# Patient Record
Sex: FEMALE | Race: BLACK OR AFRICAN AMERICAN | NOT HISPANIC OR LATINO | Employment: OTHER | URBAN - METROPOLITAN AREA
[De-identification: names, ages, dates, MRNs, and addresses within clinical notes are randomized per-mention and may not be internally consistent; named-entity substitution may affect disease eponyms.]

---

## 2018-10-12 ENCOUNTER — HISTORICAL (OUTPATIENT)
Dept: ADMINISTRATIVE | Facility: HOSPITAL | Age: 73
End: 2018-10-12

## 2018-10-12 LAB
ABS NEUT (OLG): 2.48 X10(3)/MCL (ref 2.1–9.2)
ALBUMIN SERPL-MCNC: 3.4 GM/DL (ref 3.4–5)
ALBUMIN/GLOB SERPL: 1 RATIO (ref 1–2)
ALP SERPL-CCNC: 100 UNIT/L (ref 45–117)
ALT SERPL-CCNC: 35 UNIT/L (ref 12–78)
AST SERPL-CCNC: 25 UNIT/L (ref 15–37)
BASOPHILS # BLD AUTO: 0.05 X10(3)/MCL
BASOPHILS NFR BLD AUTO: 1 %
BILIRUB SERPL-MCNC: 0.2 MG/DL (ref 0.2–1)
BILIRUBIN DIRECT+TOT PNL SERPL-MCNC: <0.1 MG/DL
BILIRUBIN DIRECT+TOT PNL SERPL-MCNC: ABNORMAL MG/DL
BUN SERPL-MCNC: 22 MG/DL (ref 7–18)
CALCIUM SERPL-MCNC: 8.9 MG/DL (ref 8.5–10.1)
CHLORIDE SERPL-SCNC: 105 MMOL/L (ref 98–107)
CHOLEST SERPL-MCNC: 136 MG/DL
CHOLEST/HDLC SERPL: 4.4 {RATIO} (ref 0–4.4)
CO2 SERPL-SCNC: 28 MMOL/L (ref 21–32)
CREAT SERPL-MCNC: 0.8 MG/DL (ref 0.6–1.3)
EOSINOPHIL # BLD AUTO: 0.16 10*3/UL
EOSINOPHIL NFR BLD AUTO: 3 %
ERYTHROCYTE [DISTWIDTH] IN BLOOD BY AUTOMATED COUNT: 12.5 % (ref 11.5–14.5)
EST. AVERAGE GLUCOSE BLD GHB EST-MCNC: 126 MG/DL
GLOBULIN SER-MCNC: 4.1 GM/ML (ref 2.3–3.5)
GLUCOSE SERPL-MCNC: 84 MG/DL (ref 74–106)
HBA1C MFR BLD: 6 % (ref 4.2–6.3)
HCT VFR BLD AUTO: 38.9 % (ref 35–46)
HDLC SERPL-MCNC: 31 MG/DL
HGB BLD-MCNC: 13.1 GM/DL (ref 12–16)
IMM GRANULOCYTES # BLD AUTO: 0.01 10*3/UL
IMM GRANULOCYTES NFR BLD AUTO: 0 %
LDLC SERPL CALC-MCNC: 82 MG/DL (ref 0–130)
LYMPHOCYTES # BLD AUTO: 1.9 X10(3)/MCL
LYMPHOCYTES NFR BLD AUTO: 38 % (ref 13–40)
MCH RBC QN AUTO: 31.5 PG (ref 26–34)
MCHC RBC AUTO-ENTMCNC: 33.7 GM/DL (ref 31–37)
MCV RBC AUTO: 93.5 FL (ref 80–100)
MONOCYTES # BLD AUTO: 0.47 X10(3)/MCL
MONOCYTES NFR BLD AUTO: 9 % (ref 4–12)
NEUTROPHILS # BLD AUTO: 2.48 X10(3)/MCL
NEUTROPHILS NFR BLD AUTO: 49 X10(3)/MCL
PLATELET # BLD AUTO: 255 X10(3)/MCL (ref 130–400)
PMV BLD AUTO: 9.3 FL (ref 7.4–10.4)
POTASSIUM SERPL-SCNC: 4.3 MMOL/L (ref 3.5–5.1)
PROT SERPL-MCNC: 7.5 GM/DL (ref 6.4–8.2)
RBC # BLD AUTO: 4.16 X10(6)/MCL (ref 4–5.2)
SODIUM SERPL-SCNC: 140 MMOL/L (ref 136–145)
TRIGL SERPL-MCNC: 114 MG/DL
TSH SERPL-ACNC: 1.27 MIU/L (ref 0.36–3.74)
VLDLC SERPL CALC-MCNC: 23 MG/DL
WBC # SPEC AUTO: 5.1 X10(3)/MCL (ref 4.5–11)

## 2018-11-29 ENCOUNTER — HISTORICAL (OUTPATIENT)
Dept: INTERNAL MEDICINE | Facility: CLINIC | Age: 73
End: 2018-11-29

## 2019-04-09 ENCOUNTER — HISTORICAL (OUTPATIENT)
Dept: ADMINISTRATIVE | Facility: HOSPITAL | Age: 74
End: 2019-04-09

## 2019-04-09 LAB
APPEARANCE, UA: CLEAR
BACTERIA #/AREA URNS AUTO: ABNORMAL /[HPF]
BILIRUB SERPL-MCNC: NEGATIVE MG/DL
BILIRUB UR QL STRIP: NEGATIVE
BLOOD URINE, POC: NEGATIVE
CLARITY, POC UA: NORMAL
COLOR UR: YELLOW
COLOR, POC UA: YELLOW
GLUCOSE (UA): NORMAL
GLUCOSE UR QL STRIP: NEGATIVE
HGB UR QL STRIP: NEGATIVE
HYALINE CASTS #/AREA URNS LPF: ABNORMAL /[LPF]
KETONES UR QL STRIP: NEGATIVE
KETONES UR QL STRIP: NEGATIVE
LEUKOCYTE EST, POC UA: NORMAL
LEUKOCYTE ESTERASE UR QL STRIP: 75 LEU/UL
NITRITE UR QL STRIP: ABNORMAL
NITRITE, POC UA: POSITIVE
PH UR STRIP: 6 [PH] (ref 4.5–8)
PH, POC UA: 6
PROT UR QL STRIP: 10 MG/DL
PROTEIN, POC: NEGATIVE
RBC #/AREA URNS AUTO: ABNORMAL /[HPF]
SP GR UR STRIP: 1.02 (ref 1–1.03)
SPECIFIC GRAVITY, POC UA: 1.03
SQUAMOUS #/AREA URNS LPF: ABNORMAL /[LPF]
UROBILINOGEN UR STRIP-ACNC: NORMAL
UROBILINOGEN, POC UA: NORMAL
WBC #/AREA URNS AUTO: ABNORMAL /HPF

## 2019-10-30 ENCOUNTER — HISTORICAL (OUTPATIENT)
Dept: ENDOSCOPY | Facility: HOSPITAL | Age: 74
End: 2019-10-30

## 2020-01-20 ENCOUNTER — HISTORICAL (OUTPATIENT)
Dept: RADIOLOGY | Facility: HOSPITAL | Age: 75
End: 2020-01-20

## 2020-08-25 ENCOUNTER — HISTORICAL (OUTPATIENT)
Dept: INTERNAL MEDICINE | Facility: CLINIC | Age: 75
End: 2020-08-25

## 2020-08-25 LAB
ABS NEUT (OLG): 2.4 X10(3)/MCL (ref 2.1–9.2)
ALBUMIN SERPL-MCNC: 3.6 GM/DL (ref 3.4–5)
ALBUMIN/GLOB SERPL: 0.9 RATIO (ref 1.1–2)
ALP SERPL-CCNC: 99 UNIT/L (ref 45–117)
ALT SERPL-CCNC: 34 UNIT/L (ref 12–78)
AST SERPL-CCNC: 24 UNIT/L (ref 15–37)
BASOPHILS # BLD AUTO: 0 X10(3)/MCL (ref 0–0.2)
BASOPHILS NFR BLD AUTO: 1 %
BILIRUB SERPL-MCNC: 0.4 MG/DL (ref 0.2–1)
BILIRUBIN DIRECT+TOT PNL SERPL-MCNC: 0.1 MG/DL (ref 0–0.2)
BILIRUBIN DIRECT+TOT PNL SERPL-MCNC: 0.3 MG/DL
BUN SERPL-MCNC: 19 MG/DL (ref 7–18)
CALCIUM SERPL-MCNC: 9.2 MG/DL (ref 8.5–10.1)
CHLORIDE SERPL-SCNC: 108 MMOL/L (ref 98–107)
CHOLEST SERPL-MCNC: 172 MG/DL
CHOLEST/HDLC SERPL: 4.8 {RATIO} (ref 0–4.4)
CO2 SERPL-SCNC: 27 MMOL/L (ref 21–32)
CREAT SERPL-MCNC: 0.8 MG/DL (ref 0.6–1.3)
EOSINOPHIL # BLD AUTO: 0.1 X10(3)/MCL (ref 0–0.9)
EOSINOPHIL NFR BLD AUTO: 1 %
ERYTHROCYTE [DISTWIDTH] IN BLOOD BY AUTOMATED COUNT: 12.9 % (ref 11.5–14.5)
EST. AVERAGE GLUCOSE BLD GHB EST-MCNC: 128 MG/DL
GLOBULIN SER-MCNC: 4.2 GM/ML (ref 2.3–3.5)
GLUCOSE SERPL-MCNC: 87 MG/DL (ref 74–106)
HBA1C MFR BLD: 6.1 % (ref 4.2–6.3)
HCT VFR BLD AUTO: 39.2 % (ref 35–46)
HDLC SERPL-MCNC: 36 MG/DL (ref 40–59)
HGB BLD-MCNC: 13 GM/DL (ref 12–16)
IMM GRANULOCYTES # BLD AUTO: 0.01 10*3/UL
IMM GRANULOCYTES NFR BLD AUTO: 0 %
LDLC SERPL CALC-MCNC: 113 MG/DL
LYMPHOCYTES # BLD AUTO: 1.8 X10(3)/MCL (ref 0.6–4.6)
LYMPHOCYTES NFR BLD AUTO: 38 %
MCH RBC QN AUTO: 31.8 PG (ref 26–34)
MCHC RBC AUTO-ENTMCNC: 33.2 GM/DL (ref 31–37)
MCV RBC AUTO: 95.8 FL (ref 80–100)
MONOCYTES # BLD AUTO: 0.4 X10(3)/MCL (ref 0.1–1.3)
MONOCYTES NFR BLD AUTO: 9 %
NEUTROPHILS # BLD AUTO: 2.4 X10(3)/MCL (ref 2.1–9.2)
NEUTROPHILS NFR BLD AUTO: 50 %
PLATELET # BLD AUTO: 265 X10(3)/MCL (ref 130–400)
PMV BLD AUTO: 9.2 FL (ref 7.4–10.4)
POTASSIUM SERPL-SCNC: 4.4 MMOL/L (ref 3.5–5.1)
PROT SERPL-MCNC: 7.8 GM/DL (ref 6.4–8.2)
RBC # BLD AUTO: 4.09 X10(6)/MCL (ref 4–5.2)
SODIUM SERPL-SCNC: 142 MMOL/L (ref 136–145)
TRIGL SERPL-MCNC: 117 MG/DL
TSH SERPL-ACNC: 1.06 MIU/L (ref 0.36–3.74)
VLDLC SERPL CALC-MCNC: 23 MG/DL
WBC # SPEC AUTO: 4.8 X10(3)/MCL (ref 4.5–11)

## 2020-11-12 ENCOUNTER — HISTORICAL (OUTPATIENT)
Dept: INTERNAL MEDICINE | Facility: CLINIC | Age: 75
End: 2020-11-12

## 2020-11-12 LAB
ABS NEUT (OLG): 1.86 X10(3)/MCL (ref 2.1–9.2)
ALBUMIN SERPL-MCNC: 3.7 GM/DL (ref 3.4–4.8)
ALBUMIN/GLOB SERPL: 1.2 RATIO (ref 1.1–2)
ALP SERPL-CCNC: 100 UNIT/L (ref 40–150)
ALT SERPL-CCNC: 30 UNIT/L (ref 0–55)
AST SERPL-CCNC: 26 UNIT/L (ref 5–34)
BASOPHILS # BLD AUTO: 0 X10(3)/MCL (ref 0–0.2)
BASOPHILS NFR BLD AUTO: 1 %
BILIRUB SERPL-MCNC: 0.3 MG/DL
BILIRUBIN DIRECT+TOT PNL SERPL-MCNC: 0.1 MG/DL (ref 0–0.5)
BILIRUBIN DIRECT+TOT PNL SERPL-MCNC: 0.2 MG/DL (ref 0–0.8)
BUN SERPL-MCNC: 21 MG/DL (ref 9.8–20.1)
CALCIUM SERPL-MCNC: 9.3 MG/DL (ref 8.4–10.2)
CHLORIDE SERPL-SCNC: 106 MMOL/L (ref 98–107)
CHOLEST SERPL-MCNC: 150 MG/DL
CHOLEST/HDLC SERPL: 5 {RATIO} (ref 0–5)
CO2 SERPL-SCNC: 27 MMOL/L (ref 23–31)
CREAT SERPL-MCNC: 0.72 MG/DL (ref 0.55–1.02)
EOSINOPHIL # BLD AUTO: 0.2 X10(3)/MCL (ref 0–0.9)
EOSINOPHIL NFR BLD AUTO: 5 %
ERYTHROCYTE [DISTWIDTH] IN BLOOD BY AUTOMATED COUNT: 12.9 % (ref 11.5–14.5)
GLOBULIN SER-MCNC: 3 GM/DL (ref 2.4–3.5)
GLUCOSE SERPL-MCNC: 102 MG/DL (ref 82–115)
HCT VFR BLD AUTO: 39.1 % (ref 35–46)
HDLC SERPL-MCNC: 33 MG/DL (ref 35–60)
HGB BLD-MCNC: 12.9 GM/DL (ref 12–16)
LDLC SERPL CALC-MCNC: 96 MG/DL (ref 50–140)
LYMPHOCYTES # BLD AUTO: 1.8 X10(3)/MCL (ref 0.6–4.6)
LYMPHOCYTES NFR BLD AUTO: 42 %
MCH RBC QN AUTO: 31.9 PG (ref 26–34)
MCHC RBC AUTO-ENTMCNC: 33 GM/DL (ref 31–37)
MCV RBC AUTO: 96.5 FL (ref 80–100)
MONOCYTES # BLD AUTO: 0.4 X10(3)/MCL (ref 0.1–1.3)
MONOCYTES NFR BLD AUTO: 8 %
NEUTROPHILS # BLD AUTO: 1.86 X10(3)/MCL (ref 2.1–9.2)
NEUTROPHILS NFR BLD AUTO: 44 %
PLATELET # BLD AUTO: 217 X10(3)/MCL (ref 130–400)
PMV BLD AUTO: 9.4 FL (ref 7.4–10.4)
POTASSIUM SERPL-SCNC: 4.4 MMOL/L (ref 3.5–5.1)
PROT SERPL-MCNC: 6.7 GM/DL (ref 5.8–7.6)
RBC # BLD AUTO: 4.05 X10(6)/MCL (ref 4–5.2)
SODIUM SERPL-SCNC: 139 MMOL/L (ref 136–145)
T4 FREE SERPL-MCNC: 0.95 NG/DL (ref 0.7–1.48)
TRIGL SERPL-MCNC: 106 MG/DL (ref 37–140)
VLDLC SERPL CALC-MCNC: 21 MG/DL
WBC # SPEC AUTO: 4.3 X10(3)/MCL (ref 4.5–11)

## 2021-01-12 ENCOUNTER — HISTORICAL (OUTPATIENT)
Dept: ADMINISTRATIVE | Facility: HOSPITAL | Age: 76
End: 2021-01-12

## 2021-03-02 ENCOUNTER — HISTORICAL (OUTPATIENT)
Dept: INTERNAL MEDICINE | Facility: CLINIC | Age: 76
End: 2021-03-02

## 2021-03-25 ENCOUNTER — HISTORICAL (OUTPATIENT)
Dept: ADMINISTRATIVE | Facility: HOSPITAL | Age: 76
End: 2021-03-25

## 2022-04-11 ENCOUNTER — HISTORICAL (OUTPATIENT)
Dept: ADMINISTRATIVE | Facility: HOSPITAL | Age: 77
End: 2022-04-11
Payer: MEDICARE

## 2022-04-24 VITALS
DIASTOLIC BLOOD PRESSURE: 61 MMHG | OXYGEN SATURATION: 98 % | HEIGHT: 61 IN | WEIGHT: 199.06 LBS | BODY MASS INDEX: 37.58 KG/M2 | SYSTOLIC BLOOD PRESSURE: 169 MMHG

## 2022-05-03 DIAGNOSIS — H40.039 ANATOMICAL NARROW ANGLE: Primary | ICD-10-CM

## 2022-05-03 RX ORDER — LATANOPROST 50 UG/ML
1 SOLUTION/ DROPS OPHTHALMIC NIGHTLY
Refills: 12 | COMMUNITY
Start: 2022-05-02 | End: 2022-05-03 | Stop reason: SDUPTHER

## 2022-05-04 RX ORDER — LATANOPROST 50 UG/ML
1 SOLUTION/ DROPS OPHTHALMIC NIGHTLY
Qty: 2.5 ML | Refills: 12 | Status: SHIPPED | OUTPATIENT
Start: 2022-05-04

## 2022-05-05 NOTE — HISTORICAL [facility] CERNER
This is a historical note converted from [employer]. Formatting and pictures may have been removed.  Please reference [employer] for original formatting and attached multimedia. History of Present Illness  75 yo AAF with PMH HTN presents for screening colonoscopy after +FIT. Never had a colonoscopy previously. Patient denies dark/black stool or BRBPR. No knowledge of family history of colon/rectal cancers. Patient denies fever, chills, headache,?chest pain, SOB, N/V, abdominal pain, changes in urinary/bowel habits, or unintentional weight loss/gain.  Review of Systems  Negative except documented in?HPI  Physical Exam  Vitals & Measurements  T:?37? ?C (Temporal Artery)? HR:?65(Monitored)? RR:?18? BP:?139/66? SpO2:?98%? WT:?97.1?kg?  General:?well-developed, well-nourished, in no acute distress  HENT:?atraumatic, oropharynx without erythema/exudate, oropharynx and nasal mucosal surfaces moist  Respiratory:?non-labored breathing, equal chest rise  Cardiovascular:?RRR by radial pulse, distal pulses intact  Gastrointestinal:?soft, non-tender, non-distended, no masses  Musculoskeletal:?no edema, no crepitus  Neurologic: non-focal exam, motor/sensory function grossly intact  Assessment/Plan  75 yo AAF with PMH HTN presents for screening colonoscopy after +FIT.  ?  - mechanical bowel prep completed  - procedure risks/benefits discussed, questions answered, written informed consent obtained  - proceed with colonoscopy  ?  Richard Medley MD  [employer] General Surgery, PGY-2  10/30/19   Problem List/Past Medical History  Ongoing  Arthritis  HLD (hyperlipidemia)  HTN (hypertension)  HTN (hypertension)  HTN (hypertension)  Leg pain  Leg pain  Memory loss  Morbid obesity  OAB (overactive bladder)  Stasis dermatitis  Venous stasis  Historical  No qualifying data  Procedure/Surgical History  Hysterectomy  Total abdominal hysterectomy (corpus and cervix), with or without removal of tube(s), with or without removal of ovary(s);    Medications  Inpatient  buffered lidocaine 2% - 0.5 ml syringe, 10 mg= 0.5 mL, Subcutaneous, As Directed  IVF Lactated Ringers LR Infusion 1,000 mL, 1000 mL, IV  Home  amlodipine 10 mg oral tablet, 10 mg= 1 tab(s), Oral, Daily, 3 refills  Centrum Silver oral tablet, 1 tab(s), Oral, Daily  diclofenac sodium 75 mg oral delayed release tablet, 75 mg= 1 tab(s), Oral, BID  memantine 21 mg oral capsule, extended release, See Instructions  Metoprolol Tartrate 50 mg oral tablet, 50 mg= 1 tab(s), Oral, BID, 3 refills  Metoprolol Tartrate 50 mg oral tablet, 50 mg= 1 tab(s), Oral, BID, 3 refills  oxybutynin 5 mg oral tablet, 5 mg= 1 tab(s), Oral, BID, 1 refills  pravastatin 80 mg oral tablet, See Instructions, 3 refills  spironolactone 25 mg oral tablet, 25 mg= 1 tab(s), Oral, Daily, 3 refills  Xalatan 0.005% ophthalmic solution, 1 drop(s), Eye-Both, qPM, 11 refills  Allergies  No Known Allergies  No Known Medication Allergies  Social History  Abuse/Neglect  No, 10/30/2019  Alcohol - Low Risk, 10/13/2014  Past, 10/12/2018  Current, Beer, 1-2 times per month, Alcohol use interferes with work or home: No. Drinks more than intended: No. Others hurt by drinking: No. Ready to change: No. Household alcohol concerns: No., 04/12/2018  Current, Beer, 1-2 times per month, Ready to change: No., 10/13/2014  Employment/School  Work/School description: homemaker. Operates hazardous equipment: No., 07/08/2016  Exercise  Exercise frequency: 3-4 times/week. Self assessment: Fair condition. Exercise type: Walking, Aerobics., 07/24/2015  Home/Environment  Lives with Spouse. Living situation: Home/Independent. Home equipment: Walker/Cane. Alcohol abuse in household: No. Substance abuse in household: No. Smoker in household: No. Injuries/Abuse/Neglect in household: No. Feels unsafe at home: No. Safe place to go: Yes. Agency(s)/Others notified: No. Family/Friends available for support: Yes. Concern for family members at home: No. Major illness in  household: No. Financial concerns: No. TV/Computer concerns: No., 07/08/2016  Nutrition/Health  Regular, Caffeine intake amount: coffee ,tea. Wants to lose weight: Yes. Sleeping concerns: No. Feels highly stressed: No., 07/24/2015  Substance Use - Denies Substance Abuse, 10/13/2014  Never, 09/17/2015  Tobacco - Denies Tobacco Use, 05/09/2014  Former smoker, quit more than 30 days ago, No, 10/30/2019  Former smoker, quit more than 30 days ago, N/A, 04/09/2019  Former smoker, quit more than 30 days ago, No, 03/21/2019  Former smoker, N/A, 11/20/2018  Family History  Hypertension.: Sister and Brother.  Immunizations  Vaccine Date Status   influenza virus vaccine, inactivated 10/29/2016 Recorded       Patient here for a colonoscopy for FIT positive exam.? She reports no prior colonoscopy.? Denies any constipation, rectal bleeding, melena or weight loss.? She denies any family history of colon polyps or colon cancer.

## 2022-05-05 NOTE — HISTORICAL [facility] CERNER
This is a historical note converted from [employer]. Formatting and pictures may have been removed.  Please reference [facility] for original formatting and attached multimedia. Chief Complaint  arthritis of both knees  History of Present Illness  74 yo htn obsity hx memory loss sees neurohtn chol glaucoma neuro dxed pt with lewy body demtia started med hx colon polyp today co b/l knee pain asa  Review of Systems  neg cv, neg pulm, neg gi gu ros as in hpi pt is alone today  Physical Exam  Vitals & Measurements  T:?36.8? ?C (Oral)? HR:?52(Peripheral)? RR:?18? BP:?162/73?  BMI:?40.27?  aax4 nad  lamar eomi  cv rrr s1s2 no mgr  lungs cta no cr or whz  abd nt soft  ext no edema  neuro intact  Assessment/Plan  1.?Lewy body dementia?G31.83  ?d/c hydroxyzine  ?  2.?Arthritis?M19.90  ?x ray ortho  Ordered:  Clinic Follow up, *Est. 07/12/21 3:00:00 CDT, Order for future visit, Arthritis, [facility]  XR Knee Left 3 Views, Routine, 01/12/21 9:23:00 CST, None, Ambulatory, Rad Type, Order for future visit, Arthritis, Not Scheduled, 01/12/21 9:23:00 CST  XR Knee Right 3 Views, Routine, 01/12/21 9:23:00 CST, None, Ambulatory, Rad Type, Order for future visit, Arthritis, Not Scheduled, 01/12/21 9:23:00 CST  ?  3.?HLD (hyperlipidemia)?E78.5  ?flp  ?  4.?HTN (hypertension)?I10  ?adjust med  Ordered:  CBC w/ Auto Diff, Routine collect, *Est. 07/12/21 3:00:00 CDT, Blood, Order for future visit, *Est. Stop date 07/12/21 3:00:00 CDT, Lab Collect, HTN (hypertension), 01/12/21 9:22:00 CST  Comprehensive Metabolic Panel, Routine collect, *Est. 07/12/21 3:00:00 CDT, Blood, Order for future visit, *Est. Stop date 07/12/21 3:00:00 CDT, Lab Collect, HTN (hypertension), 01/12/21 9:22:00 CST  Free T4, Routine collect, *Est. 07/12/21 3:00:00 CDT, Blood, Order for future visit, *Est. Stop date 07/12/21 3:00:00 CDT, Lab Collect, HTN (hypertension), 01/12/21 9:22:00 CST  Hemoglobin A1C [facility], Routine collect, *Est. 07/12/21 3:00:00 CDT, Blood, Order for  future visit, *Est. Stop date 07/12/21 3:00:00 CDT, Lab Collect, HTN (hypertension), 01/12/21 9:23:00 CST  Lipid Panel, Routine collect, *Est. 07/12/21 3:00:00 CDT, Blood, Order for future visit, *Est. Stop date 07/12/21 3:00:00 CDT, Lab Collect, HTN (hypertension), 01/12/21 9:22:00 CST  Office/Outpatient Visit Level 4 Established [id number] PC, HTN (hypertension), [facility], 01/12/21 9:21:00 CST  Thyroid Stimulating Hormone, Routine collect, *Est. 07/12/21 3:00:00 CDT, Blood, Order for future visit, *Est. Stop date 07/12/21 3:00:00 CDT, Lab Collect, HTN (hypertension), 01/12/21 9:22:00 CST  ?  Referrals  [facility] Internal Referral to Orthopedics Clinic, Specialty: Orthopedic Surgery, Reason: arthritis both knees, Start: 01/12/21 9:22:00 CST, Service By: 01/13/21  Clinic Follow up, *Est. 07/12/21 3:00:00 CDT, Order for future visit, Arthritis, [facility]   Problem List/Past Medical History  Ongoing  Arthritis  HLD (hyperlipidemia)  HTN (hypertension)  HTN (hypertension)  HTN (hypertension)  Leg pain  Leg pain  Lewy body dementia  Medication side effect  Memory loss  Morbid obesity  OAB (overactive bladder)  Stasis dermatitis  Venous stasis  Historical  No qualifying data  Procedure/Surgical History  Colonoscopy (None) (10/30/2019)  Colonoscopy, flexible; with removal of tumor(s), polyp(s), or other lesion(s) by snare technique (10/30/2019)  Excision of Descending Colon, Via Natural or Artificial Opening Endoscopic, Diagnostic (10/30/2019)  Polypectomy (None) (10/30/2019)  Hysterectomy  Total abdominal hysterectomy (corpus and cervix), with or without removal of tube(s), with or without removal of ovary(s);   Medications  amlodipine 10 mg oral tablet, 10 mg= 1 tab(s), Oral, Daily, 3 refills  calcium (as carbonate) 500 mg oral tablet  Centrum Silver oral tablet, 1 tab(s), Oral, Daily  diclofenac sodium 75 mg oral delayed release tablet, 75 mg= 1 tab(s), Oral, BID, 2 refills  hydrOXYzine pamoate 25 mg oral capsule, 25 mg=  1 cap(s), Oral, Daily, PRN, 5 refills  latanoprost 0.005% ophthalmic solution, 1 drop(s), Eye-Both, qPM, 8 refills  memantine 21 mg oral capsule, extended release, See Instructions, 11 refills  Metoprolol Tartrate 50 mg oral tablet, See Instructions, 3 refills  oxybutynin 5 mg oral tablet, 5 mg= 1 tab(s), Oral, Daily  pravastatin 80 mg oral tablet, See Instructions, 3 refills  rivastigmine 4.5 mg oral capsule, 4.5 mg= 1 cap(s), Oral, BID, 1 refills  spironolactone 25 mg oral tablet, 25 mg= 1 tab(s), Oral, Daily, 3 refills  Xalatan 0.005% ophthalmic solution, 1 drop(s), Eye-Both, qPM, 11 refills  Allergies  No Known Allergies  Social History  Abuse/Neglect  No, No, Yes, 11/11/2020  Alcohol - Low Risk, 10/13/2014  Current, Beer, 1-2 times per year, Alcohol use interferes with work or home: No. Others hurt by drinking: No. Household alcohol concerns: No., 11/11/2020  Employment/School  Work/School description: homemaker. Operates hazardous equipment: No., 07/08/2016  Exercise  Exercise frequency: 3-4 times/week. Self assessment: Fair condition. Exercise type: Walking, Aerobics., 07/24/2015  Home/Environment  Lives with Spouse. Living situation: Home/Independent. Home equipment: Walker/Cane. Alcohol abuse in household: No. Substance abuse in household: No. Smoker in household: No. Injuries/Abuse/Neglect in household: No. Feels unsafe at home: No. Safe place to go: Yes. Agency(s)/Others notified: No. Family/Friends available for support: Yes. Concern for family members at home: No. Major illness in household: No. Financial concerns: No. TV/Computer concerns: No., 07/08/2016    Never in , 11/11/2020  Nutrition/Health  Regular, Caffeine intake amount: coffee ,tea. Wants to lose weight: Yes. Sleeping concerns: No. Feels highly stressed: No., 07/24/2015  Sexual  Gender Identity Identifies as female., 06/23/2020  Spiritual/Cultural  [employer], 11/11/2020  Substance Use - Denies Substance Abuse,  10/13/2014  Never, 09/17/2015  Tobacco - Denies Tobacco Use, 05/09/2014  Former smoker, quit more than 30 days ago, N/A, 11/11/2020  Family History  Hypertension.: Sister and Brother.  Immunizations  Vaccine Date Status   influenza virus vaccine, inactivated 10/29/2016 Recorded   Health Maintenance  Health Maintenance  ???Pending?(in the next year)  ??? ??OverDue  ??? ? ? ?Influenza Vaccine due??10/01/20??and every 1??day(s)  ??? ? ? ?Advance Directive due??01/02/21??and every 1??year(s)  ??? ? ? ?Cognitive Screening due??01/02/21??and every 1??year(s)  ??? ? ? ?Fall Risk Assessment due??01/02/21??and every 1??year(s)  ??? ? ? ?Functional Assessment due??01/02/21??and every 1??year(s)  ??? ??Due?  ??? ? ? ?Alcohol Misuse Screening due??01/02/21??and every 1??year(s)  ??? ? ? ?Aspirin Therapy for CVD Prevention due??01/12/21??and every 1??year(s)  ??? ? ? ?Medicare Annual Wellness Exam due??01/12/21??and every 1??year(s)  ??? ? ? ?Pneumococcal Vaccine due??01/12/21??Unknown Frequency  ??? ? ? ?Tetanus Vaccine due??01/12/21??and every 10??year(s)  ??? ? ? ?Zoster Vaccine due??01/12/21??Unknown Frequency  ??? ??Due In Future?  ??? ? ? ?ADL Screening not due until??09/23/21??and every 1??year(s)  ??? ? ? ?Depression Screening not due until??11/11/21??and every 1??year(s)  ??? ? ? ?Diabetes Screening not due until??11/12/21??and every 1??year(s)  ??? ? ? ?Hypertension Management-BMP not due until??11/12/21??and every 1??year(s)  ??? ? ? ?Obesity Screening not due until??01/01/22??and every 1??year(s)  ???Satisfied?(in the past 1 year)  ??? ??Satisfied?  ??? ? ? ?ADL Screening on??09/23/20.??Satisfied by Arleen Chaparro  ??? ? ? ?Advance Directive on??11/11/20.??Satisfied by Nyasia De La Rosa RN  ??? ? ? ?Alcohol Misuse Screening on??11/11/20.??Satisfied by Nyasia De La Rosa RN  ??? ? ? ?Blood Pressure Screening on??01/12/21.??Satisfied by Domenic Abreu LPN  ??? ? ? ?Body Mass Index Check  on??01/12/21.??Satisfied by Brady FISHMAN, Domenic Aerial  ??? ? ? ?Bone Density Screening on??01/20/20.??Satisfied by Heather Manriquez  ??? ? ? ?Cognitive Screening on??11/11/20.??Satisfied by Nyasia De La Rosa RN  ??? ? ? ?Coronary Artery Disease Maintenance-Lipid Lowering Therapy on??01/15/20.??Satisfied by Latasha Wheatley MD  ??? ? ? ?Depression Screening on??11/11/20.??Satisfied by Nyasia De La Rosa RN R  ??? ? ? ?Diabetes Screening on??11/12/20.??Satisfied by Taya Serna  ??? ? ? ?Fall Risk Assessment on??11/11/20.??Satisfied by Nyasia De La Rosa RN  ??? ? ? ?Functional Assessment on??11/11/20.??Satisfied by Nyasia De La Rosa RN R  ??? ? ? ?Hypertension Management-Blood Pressure on??01/12/21.??Satisfied by Brady FISHMAN, Domenic Aerial  ??? ? ? ?Influenza Vaccine on??01/12/21.??Satisfied by Brady FISHMAN, Domenic Aerial  ??? ? ? ?Lipid Screening on??11/12/20.??Satisfied by Taya Serna.  ??? ? ? ?Obesity Screening on??01/12/21.??Satisfied by Brady FISHMAN, Domenic Aerial  ?

## 2022-05-05 NOTE — HISTORICAL [facility] CERNER
This is a historical note converted from Severino. Formatting and pictures may have been removed.  Please reference Severino for original formatting and attached multimedia. Chief Complaint  Bilateral knee  History of Present Illness  75 Years?Female?non- smoker?presented?with sports medicine clinic for?follow- up visit?for?bilateral?knee pain.?Patient reports?pain in??diffusely in the knee.  Referral from PCP Dr. Cherise Wheatley for bilateral knee pain. ?Patient has a history of Lewy body dementia, daughter provided?most of history today. ?Has been ongoing for several years?ability has decreased since pandemic started. ?Prior to pandemic, patient used to walk in the park daily for exercise.? Currently has nurse that does home exercises 2 times per week?for the last 4-5 months now.? Takes diclofenac 75 mg?2 tabs?twice daily for pain. ?Has also tried?topical over-the-counter Aspercreme?with minor relief. Denies h/o formal PT or injections.  ?   Onset:?insidious over years  Current pain level: 9/10?( rated as?moderate)?without medication?quality described as?constant aching  Modifying factors:?worse with activity and improved with rest?;?stiffness after immobilization?stiffness improved with less than 30 minutes of activity  Previous treatments:?conservative treatments for at least 3 months with HEP and medications  Previous injuries:?denies?  Associated symptoms:?crepitus/grinding;?no numbness or tingling;?intermittent swelling;?skin changes;?weakness;?moderate decreased ROM  Activity:?sedentary; full ADLs;?pain interferes with function/daily activity (moderate)  Family History:?no family history of arthritis  History:?Quit smoking over 40 years ago.  Review of Systems  Constitutional: no fever or fatigue  Eye:?No drainage or discharge  Respiratory:?no cough  Cardiovascular:?No chest pain,?no edema  Gastrointestinal:?no nausea, diarrhea, constipation,?or vomiting  Genitourinary:?no urinary changes  Musculoskeletal:?noted  above in HPI  Neurologic:?No lethargy  Skin:?No rash??  Physical Exam  Vitals & Measurements  HR:?61(Peripheral)? BP:?152/75?  HT:?156.00?cm? WT:?92.800?kg? BMI:?38.13?  Appearance: alert, no acute distress, not ill appearing  Skin: warm, dry, intact  Neck: Supple  Head: atraumatic, normocephalic  Heart: no edema  Lungs: nonlabored breathing  Psychiatric: cooperative  ?   MSK exam of Knee  Left knee  Inspection:?antalgic gait;?full weight?bearing w/ assistive device. varus alignment;?swelling noted diffusely to knee; no erythema;?no bruising;?deconditioning noted.  Palpation:?non tender;?crepitus  ROM:  Active Extension/Flexion ( 0-140): 110  Passive Extension/Flexion ( 0-140):110  Strength: Flexion 4/5, Extension 4/5  Special tests:  Ballotable effusion:?negative  Anterior drawer:?negative  Lachman:?negative  Posterior drawer:?negative  Varus stress:?LCL stable at 0 and 30 degrees  Valgus stress:?Stable at 0 and 30 degrees  Apprehension:?not tested  Noble compression:?not tested  Zackery:?not tested  Neurovascular:?Intact; 2+?distal pulse, sensation intact to light touch  ?   MSK exam of Knee  right?knee  Inspection:?antalgic gait;?full weight?bearing w/ assistive device. varus alignment;?swelling noted diffusely to knee; no erythema;?no bruising;?deconditioning noted.  Palpation:?non tender;?crepitus  ROM:  Active Extension/Flexion ( 0-140): 100  Passive Extension/Flexion ( 0-140):110  Strength: Flexion 4/5, Extension 4/5  Special tests:  Ballotable effusion:?negative  Anterior drawer:?negative  Lachman:?negative  Posterior drawer:?negative  Varus stress:?LCL stable at 0 and 30 degrees  Valgus stress:?Stable at 0 and 30 degrees  Apprehension:?not tested  Noble compression:?not tested  Zackery:?not tested  Neurovascular:?Intact; 2+?distal pulse, sensation intact to light touch  Assessment/Plan  1.?Osteoarthritis of knees, bilateral?M17.0  75 Years?Female?presents for?initial encounter?for?bilateral?knee osteoarthritis.  Condition is?worsening.  Discussed in depth regarding plan for treatment. Risk versus benefit of injection and well as possible surgical procedure discussed. Patient and daughter would like to have evaluation for orthopedic surgeon. Also would like injection in knees today.? Will not perform CSI as this may delay surgery, will provide visco for some therapeutic benefit although I think it will be a minor gain.  Plan: Discussed with patient and diagnosis and treatment recommendations. Handout given.  Imaging:?Radiological studies ordered and independently reviewed, radiologist read pending  Treatment plan:?conservative treatment to include oral glucosamine 1500 mg/day  Weight management in paramount: recommend at least 10% weight loss  Procedure:?discussed CSI vs VS injections as treatment options,?Patient is appropriate for in office procedure due to pain and decreased ROM impacting daily activities.?  Activity: Activity as tolerated; HEP to include aerobic conditioning with non-painful activity and ROM/STG exercises, proper footwear, assistive device to avoid limping  Therapy: Durolane injection bilateral offered completed today.  Medication:?script given for Diclofenac 1% BID. Side effects discussed.  RTC:?PRN. Will plan for referral to orthopedics (Dr. Liu)?for evaluation for replacement.  Additional work-up:?none  ?  Discussed with the resident at time of visit? Patient chart reviewed. Patient seen and evaluated at time of visit.?HPI, PE, and Assessment and Plan reviewed. Treatment plan is reasonable and appropriate.? All questions were answered.??Compliance with treatment plan is appropriate.  ?Radiology images independently reviewed and agree with radiologist. ?Radiology images independently reviewed and agree with resident.?-Layo Solano, DO CAQSM  Orders:  diclofenac topical, 1 nereyda, TOP, BID, not to exceed 16 grams/day/single joint of lower extremities, # 100 gm, 1 Refill(s), Pharmacy: [facility], 156, cm, Height/Length Dosing, 03/25/21 9:15:00 CDT, 92.8, kg, Weight Dosing, 03/25/21 9:15:00 CDT  Referrals  Ambulatory Referral, Specialty: Orthopedic Surgery, Reason: Advanced Bilateral knee OA, Refer To: Provider Not Specified, Tito Liu MD, [address] [place] [address], [place], [id number]., Start: 03/25/21 10:19:00 CDT, Bilateral knee pain  Clinic Follow-up PRN, 03/25/21 10:38:00 CDT   Problem List/Past Medical History  Ongoing  Arthritis  High risk medication use  HLD (hyperlipidemia)  HTN (hypertension)  HTN (hypertension)  HTN (hypertension)  Leg pain  Leg pain  Lewy body dementia  Medication side effect  Memory loss  Morbid obesity  OAB (overactive bladder)  Stasis dermatitis  Venous stasis  Historical  No qualifying data  Procedure/Surgical History  Colonoscopy (None) (10/30/2019)  Colonoscopy, flexible; with removal of tumor(s), polyp(s), or other lesion(s) by snare technique (10/30/2019)  Excision of Descending Colon, Via Natural or Artificial Opening Endoscopic, Diagnostic (10/30/2019)  Polypectomy (None) (10/30/2019)  Hysterectomy  Total abdominal hysterectomy (corpus and cervix), with or without removal of tube(s), with or without removal of ovary(s);   Medications  amlodipine 10 mg oral tablet, 10 mg= 1 tab(s), Oral, Daily, 3 refills  aspirin 81 mg oral Delayed Release (EC) tablet, 81 mg= 1 tab(s), Oral, Daily, 11 refills  calcium (as carbonate) 500 mg oral tablet  Centrum Silver oral tablet, 1 tab(s), Oral, Daily  diclofenac 1% topical gel, 1 nereyda, TOP, BID, 1 refills  diclofenac sodium 75 mg oral delayed release tablet, 75 mg= 1 tab(s), Oral, BID, 2 refills  Durolane 20 mg/mL intra-articular solution, 60 mg= 3 mL, Intra-Articular, Once  Durolane 20 mg/mL intra-articular solution, 60 mg= 3 mL, Intra-Articular, Once  hydrOXYzine pamoate 25 mg oral capsule, 25 mg= 1 cap(s), Oral, Daily  latanoprost 0.005% ophthalmic solution, 1 drop(s), Eye-Both, qPM, 8 refills  Lidocaine 1% PF 5ml inj, 5 mL,  Intra-Articular, Once-Unscheduled  Lidocaine 1% PF 5ml inj, 5 mL, Intra-Articular, Once-Unscheduled  losartan 25 mg oral tablet, 25 mg= 1 tab(s), Oral, Daily, 11 refills  memantine 21 mg oral capsule, extended release, See Instructions, 11 refills  Metoprolol Tartrate 50 mg oral tablet, See Instructions, 3 refills  oxybutynin 5 mg oral tablet, 5 mg= 1 tab(s), Oral, Daily  pravastatin 80 mg oral tablet, See Instructions, 3 refills  rivastigmine 4.5 mg oral capsule, 4.5 mg= 1 cap(s), Oral, BID  rivastigmine 6 mg oral capsule, 6 mg= 1 cap(s), Oral, BID, 4 refills  Seroquel 25 mg oral tablet, See Instructions, 4 refills  spironolactone 25 mg oral tablet, 25 mg= 1 tab(s), Oral, Daily, 3 refills  Xalatan 0.005% ophthalmic solution, 1 drop(s), Eye-Both, qPM, 11 refills  Allergies  No Known Allergies  Social History  Abuse/Neglect  No, No, Yes, 03/25/2021  No, No, Yes, 02/25/2021  No, No, Yes, 01/12/2021  Alcohol - Low Risk, 10/13/2014  Past, 02/25/2021  Current, Beer, 1-2 times per year, Alcohol use interferes with work or home: No. Others hurt by drinking: No. Household alcohol concerns: No., 11/11/2020  Employment/School  Work/School description: homemaker. Operates hazardous equipment: No., 07/08/2016  Exercise  Exercise frequency: 3-4 times/week. Self assessment: Fair condition. Exercise type: Walking, Aerobics., 07/24/2015  Home/Environment  Lives with Spouse. Living situation: Home/Independent. Home equipment: Walker/Cane. Alcohol abuse in household: No. Substance abuse in household: No. Smoker in household: No. Injuries/Abuse/Neglect in household: No. Feels unsafe at home: No. Safe place to go: Yes. Agency(s)/Others notified: No. Family/Friends available for support: Yes. Concern for family members at home: No. Major illness in household: No. Financial concerns: No. TV/Computer concerns: No., 07/08/2016    Never in , 11/11/2020  Nutrition/Health  Regular, Caffeine intake amount: coffee ,tea. Wants to  lose weight: Yes. Sleeping concerns: No. Feels highly stressed: No., 07/24/2015  Sexual  Gender Identity Identifies as female., 06/23/2020  Spiritual/Cultural  Mormonism, 11/11/2020  Substance Use - Denies Substance Abuse, 10/13/2014  Never, 09/17/2015  Tobacco - Denies Tobacco Use, 05/09/2014  Former smoker, quit more than 30 days ago, No, 03/25/2021  Family History  Hypertension.: Sister and Brother.  Diagnostic Results  Per my interpretation: severe bilateral tricompartment arthritic changes, worse to medial compartment; diffuses osteophyte formation, no fractures noted.

## 2022-05-19 ENCOUNTER — OFFICE VISIT (OUTPATIENT)
Dept: FAMILY MEDICINE | Facility: CLINIC | Age: 77
End: 2022-05-19
Payer: MEDICARE

## 2022-05-19 VITALS
DIASTOLIC BLOOD PRESSURE: 63 MMHG | WEIGHT: 187.38 LBS | HEIGHT: 65 IN | TEMPERATURE: 99 F | OXYGEN SATURATION: 97 % | SYSTOLIC BLOOD PRESSURE: 126 MMHG | BODY MASS INDEX: 31.22 KG/M2 | HEART RATE: 78 BPM | RESPIRATION RATE: 20 BRPM

## 2022-05-19 DIAGNOSIS — R41.3 MEMORY LOSS: Primary | ICD-10-CM

## 2022-05-19 DIAGNOSIS — R41.3 OTHER AMNESIA: ICD-10-CM

## 2022-05-19 PROBLEM — I87.8 VENOUS STASIS: Status: ACTIVE | Noted: 2022-05-19

## 2022-05-19 PROBLEM — N32.81 OVERACTIVE BLADDER: Status: ACTIVE | Noted: 2022-04-01

## 2022-05-19 PROBLEM — I10 HYPERTENSION: Status: ACTIVE | Noted: 2022-05-19

## 2022-05-19 PROBLEM — M15.9 PRIMARY OSTEOARTHRITIS INVOLVING MULTIPLE JOINTS: Status: ACTIVE | Noted: 2022-04-01

## 2022-05-19 PROBLEM — H40.1132 PRIMARY OPEN ANGLE GLAUCOMA (POAG) OF BOTH EYES, MODERATE STAGE: Status: ACTIVE | Noted: 2022-04-01

## 2022-05-19 PROBLEM — M19.90 ARTHRITIS: Status: ACTIVE | Noted: 2022-05-19

## 2022-05-19 PROBLEM — E78.5 HYPERLIPIDEMIA: Status: ACTIVE | Noted: 2022-05-19

## 2022-05-19 PROBLEM — M15.0 PRIMARY OSTEOARTHRITIS INVOLVING MULTIPLE JOINTS: Status: ACTIVE | Noted: 2022-04-01

## 2022-05-19 LAB
ALBUMIN SERPL-MCNC: 3.9 GM/DL (ref 3.4–4.8)
ALBUMIN/GLOB SERPL: 0.9 RATIO (ref 1.1–2)
ALP SERPL-CCNC: 125 UNIT/L (ref 40–150)
ALT SERPL-CCNC: 18 UNIT/L (ref 0–55)
AST SERPL-CCNC: 23 UNIT/L (ref 5–34)
BASOPHILS # BLD AUTO: 0.07 X10(3)/MCL (ref 0–0.2)
BASOPHILS NFR BLD AUTO: 0.9 %
BILIRUBIN DIRECT+TOT PNL SERPL-MCNC: 0.3 MG/DL
BUN SERPL-MCNC: 30.7 MG/DL (ref 9.8–20.1)
CALCIUM SERPL-MCNC: 9.9 MG/DL (ref 8.4–10.2)
CHLORIDE SERPL-SCNC: 106 MMOL/L (ref 98–107)
CO2 SERPL-SCNC: 28 MMOL/L (ref 23–31)
CREAT SERPL-MCNC: 0.93 MG/DL (ref 0.55–1.02)
EOSINOPHIL # BLD AUTO: 0.16 X10(3)/MCL (ref 0–0.9)
EOSINOPHIL NFR BLD AUTO: 2.1 %
ERYTHROCYTE [DISTWIDTH] IN BLOOD BY AUTOMATED COUNT: 12.6 % (ref 11.5–17)
FOLATE SERPL-MCNC: 15.2 NG/ML (ref 7–31.4)
GLOBULIN SER-MCNC: 4.5 GM/DL (ref 2.4–3.5)
GLUCOSE SERPL-MCNC: 105 MG/DL (ref 82–115)
HCT VFR BLD AUTO: 39.3 % (ref 37–47)
HGB BLD-MCNC: 12.9 GM/DL (ref 12–16)
IMM GRANULOCYTES # BLD AUTO: 0.03 X10(3)/MCL (ref 0–0.02)
IMM GRANULOCYTES NFR BLD AUTO: 0.4 % (ref 0–0.43)
LYMPHOCYTES # BLD AUTO: 2.16 X10(3)/MCL (ref 0.6–4.6)
LYMPHOCYTES NFR BLD AUTO: 27.7 %
MCH RBC QN AUTO: 30.3 PG (ref 27–31)
MCHC RBC AUTO-ENTMCNC: 32.8 MG/DL (ref 33–36)
MCV RBC AUTO: 92.3 FL (ref 80–94)
MONOCYTES # BLD AUTO: 0.71 X10(3)/MCL (ref 0.1–1.3)
MONOCYTES NFR BLD AUTO: 9.1 %
NEUTROPHILS # BLD AUTO: 4.7 X10(3)/MCL (ref 2.1–9.2)
NEUTROPHILS NFR BLD AUTO: 59.8 %
NRBC BLD AUTO-RTO: 0 %
PLATELET # BLD AUTO: 282 X10(3)/MCL (ref 130–400)
PMV BLD AUTO: 9.3 FL (ref 9.4–12.4)
POTASSIUM SERPL-SCNC: 3.9 MMOL/L (ref 3.5–5.1)
PROT SERPL-MCNC: 8.4 GM/DL (ref 5.8–7.6)
RBC # BLD AUTO: 4.26 X10(6)/MCL (ref 4.2–5.4)
SODIUM SERPL-SCNC: 139 MMOL/L (ref 136–145)
T4 FREE SERPL-MCNC: 0.82 NG/DL (ref 0.7–1.48)
TSH SERPL-ACNC: 0.72 UIU/ML (ref 0.35–4.94)
VIT B12 SERPL-MCNC: 879 PG/ML (ref 213–816)
WBC # SPEC AUTO: 7.8 X10(3)/MCL (ref 4.5–11.5)

## 2022-05-19 PROCEDURE — 84439 ASSAY OF FREE THYROXINE: CPT

## 2022-05-19 PROCEDURE — 99215 OFFICE O/P EST HI 40 MIN: CPT | Mod: PBBFAC

## 2022-05-19 PROCEDURE — 85025 COMPLETE CBC W/AUTO DIFF WBC: CPT

## 2022-05-19 PROCEDURE — 82746 ASSAY OF FOLIC ACID SERUM: CPT

## 2022-05-19 PROCEDURE — 84443 ASSAY THYROID STIM HORMONE: CPT

## 2022-05-19 PROCEDURE — 36415 COLL VENOUS BLD VENIPUNCTURE: CPT

## 2022-05-19 PROCEDURE — 82607 VITAMIN B-12: CPT

## 2022-05-19 PROCEDURE — 80053 COMPREHEN METABOLIC PANEL: CPT

## 2022-05-19 RX ORDER — DICLOFENAC SODIUM 10 MG/G
2 GEL TOPICAL EVERY 6 HOURS PRN
COMMUNITY
Start: 2021-07-20 | End: 2022-07-28 | Stop reason: SDUPTHER

## 2022-05-19 RX ORDER — METOPROLOL TARTRATE 50 MG/1
25 TABLET ORAL 2 TIMES DAILY
COMMUNITY
Start: 2022-05-09 | End: 2022-07-28 | Stop reason: SDUPTHER

## 2022-05-19 RX ORDER — LOSARTAN POTASSIUM 25 MG/1
25 TABLET ORAL DAILY
Status: ON HOLD | COMMUNITY
Start: 2022-02-10 | End: 2023-06-06 | Stop reason: HOSPADM

## 2022-05-19 RX ORDER — AMLODIPINE BESYLATE 10 MG/1
10 TABLET ORAL DAILY
Status: ON HOLD | COMMUNITY
Start: 2021-07-22 | End: 2023-06-06 | Stop reason: HOSPADM

## 2022-05-19 RX ORDER — QUETIAPINE FUMARATE 50 MG/1
75 TABLET, FILM COATED ORAL NIGHTLY
COMMUNITY
Start: 2022-02-19 | End: 2022-06-23

## 2022-05-19 RX ORDER — DICLOFENAC SODIUM 75 MG/1
75 TABLET, DELAYED RELEASE ORAL 2 TIMES DAILY WITH MEALS
COMMUNITY
Start: 2022-04-20 | End: 2022-10-06

## 2022-05-19 RX ORDER — OXYBUTYNIN CHLORIDE 5 MG/1
1 TABLET ORAL DAILY
COMMUNITY
Start: 2021-09-27 | End: 2022-07-19 | Stop reason: SDUPTHER

## 2022-05-19 RX ORDER — RIVASTIGMINE TARTRATE 6 MG/1
4.5 CAPSULE ORAL 2 TIMES DAILY
COMMUNITY
Start: 2021-11-23 | End: 2022-07-28 | Stop reason: SDUPTHER

## 2022-05-19 RX ORDER — PRAVASTATIN SODIUM 80 MG/1
1 TABLET ORAL NIGHTLY
COMMUNITY
Start: 2021-07-22 | End: 2022-07-28 | Stop reason: SDUPTHER

## 2022-05-19 RX ORDER — SPIRONOLACTONE 25 MG/1
25 TABLET ORAL DAILY
COMMUNITY
Start: 2021-07-22 | End: 2022-11-30 | Stop reason: SDUPTHER

## 2022-05-19 RX ORDER — HYDROXYZINE HYDROCHLORIDE 25 MG/1
25 TABLET, FILM COATED ORAL NIGHTLY
COMMUNITY
Start: 2022-04-26 | End: 2022-07-28

## 2022-05-19 NOTE — PROGRESS NOTES
"[facility] OFFICE VISIT NOTE  Kathleen Arevalo  [id number]  05/19/2022      Chief Complaint: Memory Loss and Knee Pain      Kathleen Arevalo is a 76 y.o. female  presenting to [facility] for GAC assessment.    Patient presents alone to GAC clinic. Referral reviewed from Dr. Wheatley and Dr. Montgomery. Patient evaluated by Dr. Montgomery 11/21 for potential Lewy Body Dementia due to memory loss and hallucinations.     Unable to elicit reliable history from patient today without collateral from family or caretakers. Patient oriented to self and situation. Reports minimal memory loss. Denies hallucinations. Denies low moods, PHQ2 negative.     Geriatric Assessment:  Lives in apartment with , 3 steps into apartment, denies rugs or small animal  Reports still driving for herself, driven to appointment today by non-caretaker cousin  Reports independent ADL and iADLs. Still cooks for self on electric (?) stove  Unable to determine if patient has financial assistance from family member or caretaker   Continent of bowel and bladder  Cane for ambulation out of home. Reports 1 fall months ago. Unsure of details     Review of Systems   Constitutional: Negative for activity change, appetite change, fatigue and fever.   Respiratory: Negative for cough, choking and shortness of breath.    Cardiovascular: Negative for chest pain and palpitations.   Gastrointestinal: Negative for abdominal distention, constipation, diarrhea, nausea and vomiting.   Genitourinary: Negative for difficulty urinating, hematuria and urgency.   Musculoskeletal: Positive for arthralgias and gait problem.   Neurological: Negative for dizziness, light-headedness and headaches.   Psychiatric/Behavioral: Negative for agitation and behavioral problems.       Blood pressure 126/63, pulse 78, temperature 99.1 °F (37.3 °C), temperature source Oral, resp. rate 20, height 5' 5" (1.651 m), weight 85 kg (187 lb 6.3 oz), SpO2 97 %.   Physical Exam   HENT:   Mouth/Throat: Mucous membranes are " moist. Oropharynx is clear.   Eyes: Pupils are equal, round, and reactive to light.   Cardiovascular: Normal rate and regular rhythm.   Pulmonary/Chest: Effort normal and breath sounds normal. No respiratory distress.   Abdominal: Soft. Normal appearance. She exhibits no distension and no mass. There is no abdominal tenderness.   Musculoskeletal:         General: No swelling or deformity.      Comments: Stands easily with assistance of hands and cane. Ambulation with cane   Neurological: She is alert.   Neuro exam deferred due to normal extensive neurological assessment with Dr. Montgomery in 11/21  Oriented to self and situation. Not oriented to month, year, city.  Slow to answer some questions, but answers most questions appropriately    Skin: Skin is warm and dry. Capillary refill takes less than 2 seconds.   Nursing note and vitals reviewed.      SLUMS: 9/30, highest education 7th grade     Current Medications:   Current Outpatient Medications   Medication Sig Dispense Refill    latanoprost 0.005 % ophthalmic solution Place 1 drop into both eyes every evening. Place 1 drop into both eyes every evening. 2.5 mL 12     No current facility-administered medications for this visit.       Assessment:   1. Memory loss    2. Other amnesia        Plan:  Unable to fully assess patient due to minimal collateral information without family member or caretaker at appointment   Chart review w/ report of hallucinations  SLUMS indicative of dementia  Will conduct full memory loss assessment including TSH, T4, B12, Folate, Vit D, CBC, CMP, MRI brain w/o contrast   Continue Rivastigmine 6 mg BID   Appears patient is not currently established with PCP after residential Dr. Wheatley, referral to [facility] for establish PCP    Return to clinic in 1 month for GAC follow up WITH FAMILY MEMBER OR CARETAKER, or sooner if needed.     Lisa Gibbs MD  [facility] HO-2

## 2022-06-21 RX ORDER — OXYBUTYNIN CHLORIDE 5 MG/1
TABLET ORAL DAILY
Qty: 30 TABLET | Refills: 0 | OUTPATIENT
Start: 2022-06-21

## 2022-06-23 ENCOUNTER — OFFICE VISIT (OUTPATIENT)
Dept: FAMILY MEDICINE | Facility: CLINIC | Age: 77
End: 2022-06-23
Payer: MEDICARE

## 2022-06-23 VITALS
TEMPERATURE: 98 F | SYSTOLIC BLOOD PRESSURE: 136 MMHG | DIASTOLIC BLOOD PRESSURE: 62 MMHG | WEIGHT: 187.81 LBS | RESPIRATION RATE: 20 BRPM | HEART RATE: 66 BPM | BODY MASS INDEX: 33.28 KG/M2 | OXYGEN SATURATION: 99 % | HEIGHT: 63 IN

## 2022-06-23 DIAGNOSIS — F03.91 DEMENTIA WITH BEHAVIORAL DISTURBANCE, UNSPECIFIED DEMENTIA TYPE: Primary | ICD-10-CM

## 2022-06-23 LAB
DEPRECATED CALCIDIOL+CALCIFEROL SERPL-MC: 33.4 NG/ML (ref 30–80)
HBV CORE AB SERPL QL IA: NONREACTIVE
HIV 1+2 AB+HIV1 P24 AG SERPL QL IA: NONREACTIVE
T PALLIDUM AB SER QL: NONREACTIVE

## 2022-06-23 PROCEDURE — 99213 OFFICE O/P EST LOW 20 MIN: CPT | Mod: PBBFAC

## 2022-06-23 PROCEDURE — 86803 HEPATITIS C AB TEST: CPT

## 2022-06-23 PROCEDURE — 36415 COLL VENOUS BLD VENIPUNCTURE: CPT

## 2022-06-23 PROCEDURE — 82306 VITAMIN D 25 HYDROXY: CPT

## 2022-06-23 PROCEDURE — 86780 TREPONEMA PALLIDUM: CPT

## 2022-06-23 PROCEDURE — 86704 HEP B CORE ANTIBODY TOTAL: CPT

## 2022-06-23 PROCEDURE — 87389 HIV-1 AG W/HIV-1&-2 AB AG IA: CPT

## 2022-06-23 RX ORDER — QUETIAPINE FUMARATE 100 MG/1
100 TABLET, FILM COATED ORAL NIGHTLY
Qty: 60 TABLET | Refills: 0 | Status: SHIPPED | OUTPATIENT
Start: 2022-06-23 | End: 2023-01-04 | Stop reason: ALTCHOICE

## 2022-06-23 RX ORDER — QUETIAPINE FUMARATE 25 MG/1
25 TABLET, FILM COATED ORAL NIGHTLY
COMMUNITY
Start: 2022-06-16 | End: 2022-06-23

## 2022-06-23 RX ORDER — HYDROXYZINE PAMOATE 25 MG/1
25 CAPSULE ORAL DAILY
COMMUNITY
Start: 2022-05-19 | End: 2022-06-23

## 2022-06-23 NOTE — PROGRESS NOTES
[facility] OFFICE VISIT NOTE  Kathleen Arevalo  [id number]  06/23/2022      Chief Complaint: Follow-up and Memory Loss (Wandering at night)      Kathleen Arevalo is a 76 y.o. female  presenting to [facility] for [facility] assessment.    75yo female PMH arthritis, HLD, HTN, Lewy Body Dementia, memory loss, overactive bladder, stasis dermatitis, Hx of tobacco user. Presents to [facility] for follow up visit, last seen in GAC 5/19/22. She was referred from [facility], where she is being followed for cognitive decline with visual hallucinations, concern for sundowning due to medication side effects, last seen there 4/2022.     Accompanied by Nicolasa, her niece. Last visit, pt came alone    Geriatric Assessment:  Lives in house with ,  denies rugs or small animal  No longer driving for herself, driven to appointment by her niece  Reports independent ADL and iADLs, not dressing herself appropriately, for ex full clothing with a bathing suit on today. No longer cooking  Finances handled by , [employer]   Continent of bowel, not bladder with occasional use of incontinence pads   Cane for ambulation out of home on occasion.   No recent falls   Gets lost, leaves the house disoriented, visual hallucinations   Sleep is good    Current Outpatient Medications   Medication Sig Dispense Refill    amLODIPine (NORVASC) 10 MG tablet Take 10 mg by mouth once daily.      diclofenac (VOLTAREN) 75 MG EC tablet Take 75 mg by mouth 2 (two) times daily with meals.      diclofenac sodium (VOLTAREN) 1 % Gel Apply 2 g topically every 6 (six) hours as needed for Pain.      hydrOXYzine HCL (ATARAX) 25 MG tablet Take 25 mg by mouth every evening.      latanoprost 0.005 % ophthalmic solution Place 1 drop into both eyes every evening. Place 1 drop into both eyes every evening. 2.5 mL 12    losartan (COZAAR) 25 MG tablet Take 25 mg by mouth once daily.      metoprolol tartrate (LOPRESSOR) 50 MG tablet Take 50 mg by mouth 2 (two) times daily.       "oxybutynin (DITROPAN) 5 MG Tab Take 1 tablet by mouth once daily.      pravastatin (PRAVACHOL) 80 MG tablet Take 1 tablet by mouth every evening.      QUEtiapine (SEROQUEL) 50 MG tablet Take 75 mg by mouth every evening.      rivastigmine tartrate (EXELON) 6 mg capsule Take 6 mg by mouth 2 (two) times a day.      spironolactone (ALDACTONE) 25 MG tablet Take 25 mg by mouth once daily.       No current facility-administered medications for this visit.       ROS  Reports Urinary incontinence   Denies HA, dizziness, vision changes, chest pain, palpitations, diarrhea, constipation, dysuria     Physical Exam  Blood pressure 136/62, pulse 66, temperature 98.4 °F (36.9 °C), temperature source Oral, resp. rate 20, height 5' 2.99" (1.6 m), weight 85.2 kg (187 lb 13.3 oz), SpO2 99 %.    General: full set of clothing, with a bathing suit on top, urine soaked pants; accompanied by niece. Angry with me for asking niece the same question and answers are different from what she recalls. Poor oral hygiene, spitting on the floor   CVS: RRR, no murmurs. Radial pulses 2+ BL   Resp: CTA  GI: BS in all 4 quadrants, nonTTP  Neuro: AAOx person, thinks she is in [place] and cannot recall the year  Psych: appropriate    SLUMS: 9/30, highest education 7th grade; repeat today 10/30    Assessment and Plan:   Dementia thought to be 2/2 Lewy Body Dementia  memory loss  Return visit due to inability to fully assess patient due to no  family member  at appointment last time   Daughter Ashley will be her decision maker  SLUMS indicative of dementia  TSH WNL, T4 WNL, B12 high at 879, Folate WNL,  CBC WNL, CMP grossly unremarkable other than BUN 30 5/19/22  continue Rivastigmine 6 mg BID  Increase Seroquel to 100mg per night, consider addition of morning dose   referred to [facility] for establish PCP, prev with Dr VANE Wheatley  RPR, HIV, Hep panel, UA, vit D pending  MRI brain w/o contrast at [facility] July 22 1130  Written plan of care handed to niece " who accompanied pt    HTN   continue Norvasc 10 qD     HLD  continue pravastatin      Overactive bladder  continue oxybutynin 5mg      RTC GAC July 28, 1300    CE MD Erica HOII   [facility] resident

## 2022-06-23 NOTE — PROGRESS NOTES
Subjective  HPI  77yo female PMH arthritis, HLD, HTN, Lewy Body Dementia, memory loss, overactive bladder, stasis dermatitis, tobacco user. Presents to [facility] for initial evaluation. She was referred for [facility], where she is being followed for cognitive decline with visual hallucinations, concern for sundowning due to medication side effects, last seen there 4/2022. At that time, pt reported seroquel 50mg QHS not being as effective as before. Ramses reported pt going outside in [facility], also walking to neighbor's house in the middle of the night. Also, she becomes agitated with family and insists nothing is wrong with her. Family requested referral to [facility]. Has trouble remembering family member names, repeats herself, does not know where she is. 70yo at onset.     Plan at last Neuro visit:   continue Rivastigmine 6mg BID  increased Seroquel to 75mg qhs  continue Oxybutnin 5mg daily for now  decreased Namenda XR to 7mg QOD x 1 week then DC  referred to [facility]      Karena assessment:  No recent falls  Appetite is good  Sleep is good  Bowel/bladder normal and has no complaints  ADLs/iADLS - able to dress herself, needs assistance with bathing, independent of transferring, cleaning - son cooks and manages finances  no longer driving  ambulates with   vision  memory similar to previous ( ...present for entire exam), no behavioral issues per   lives with       ROS    Physical Exam     General:   CVS:   Resp:   GI:   Neuro:   Psych:       Assessment and Plan  HTN   - continue Norvasc 10 qD    HLD  - continue pravastatin     Lewy Body Dementia  - continue Rivastigmine 6mg, Seroquel 75  - last Neuro visit, pt advised to decr Namenda XR to 7mg for one week then DC  - receiving home health    Overactive bladder  - continue oxybutynin 5mg     HCM:   CMP WNL other than BUN 21 11/2020  Lipid panel WNL other than HDL 33 11/2020  TFTs WNL 8/2020  CBC WNL 11/2020  Vitamin B12 level - non on file  Folate -  non on file  RPR- non on file    Colonoscopy 10/2019, 2-4mm flat polyps in desc, 1-2mm sessile polyp in desc; repeat rec for 10/2024  MMG none on file   LDCT none on file   DEXA osteopenia at hips, mod incr fracture risk 1/2020  MRI Brain w/o Jadiel 8/29/2016 chronic microvascular changes and atrophy  EKG 3/2/2021 - QTc 40.8    Flu 10/2021  COVID x3 10/2021  PCV 13 10/2021  Due for Shingrix, PCV 23

## 2022-06-24 LAB — HCV AB SERPL QL IA: NONREACTIVE

## 2022-06-29 RX ORDER — OXYBUTYNIN CHLORIDE 5 MG/1
TABLET ORAL DAILY
Qty: 30 TABLET | Refills: 0 | OUTPATIENT
Start: 2022-06-29

## 2022-07-19 ENCOUNTER — OFFICE VISIT (OUTPATIENT)
Dept: NEUROLOGY | Facility: CLINIC | Age: 77
End: 2022-07-19
Payer: MEDICARE

## 2022-07-19 VITALS
HEART RATE: 76 BPM | HEIGHT: 63 IN | WEIGHT: 186.06 LBS | RESPIRATION RATE: 20 BRPM | DIASTOLIC BLOOD PRESSURE: 67 MMHG | SYSTOLIC BLOOD PRESSURE: 165 MMHG | OXYGEN SATURATION: 99 % | BODY MASS INDEX: 32.97 KG/M2 | TEMPERATURE: 98 F

## 2022-07-19 DIAGNOSIS — G31.83 LEWY BODY DEMENTIA WITH BEHAVIORAL DISTURBANCE: Primary | ICD-10-CM

## 2022-07-19 DIAGNOSIS — F02.818 LEWY BODY DEMENTIA WITH BEHAVIORAL DISTURBANCE: Primary | ICD-10-CM

## 2022-07-19 PROCEDURE — 99214 PR OFFICE/OUTPT VISIT, EST, LEVL IV, 30-39 MIN: ICD-10-PCS | Mod: S$PBB,,, | Performed by: NURSE PRACTITIONER

## 2022-07-19 PROCEDURE — 99215 OFFICE O/P EST HI 40 MIN: CPT | Mod: PBBFAC | Performed by: NURSE PRACTITIONER

## 2022-07-19 PROCEDURE — 99214 OFFICE O/P EST MOD 30 MIN: CPT | Mod: S$PBB,,, | Performed by: NURSE PRACTITIONER

## 2022-07-19 RX ORDER — OXYBUTYNIN CHLORIDE 5 MG/1
5 TABLET ORAL DAILY
Qty: 90 TABLET | Refills: 0 | Status: SHIPPED | OUTPATIENT
Start: 2022-07-19 | End: 2022-10-06 | Stop reason: SDUPTHER

## 2022-07-19 NOTE — PROGRESS NOTES
Subjective:       Patient ID: Kathleen Arevalo is a 77 y.o. female.    Chief Complaint: No chief complaint on file.    HPI   This is a 76 year old Female with hx of HTN, HLD, Glaucoma, who is referred for cognitive decline with pronounce visual hallucinations, concerning of medication side effect Lewy Body Dementia with sun downing. She was last seen on 11/23/2021. During that visit, Namenda XR was decreased to 7mg daily, an EKG was ordered (pending).     Interim History  Today, patient is accompanied by daughter. Has been evaluated by geriatrics who made changes to her medications (increased Seroquel to 100mg nightly). Labs and MRI brain have been ordered.  However, continues to wander out at night to neighbor's house. They will call daughter and she will come get her. Daughter states she becomes angry and yells and curses at her.Still having visual hallucinations. Sleeping most of the day. Becomes agitated with family and insists there is nothing wrong with her and does not need help. Spouse did not have eye sx as his BP was elevated. However, takes pain medication at night and does not hear patient when she leaves the  House. Dtr concerned about currently living situation. Has also been evaluated by [facility], but nurse no longer goes out. Has added additional locks to door, but patient still getting out of home. During visit, pt states she does not remember leaving home or become angry with family    Presenting History  Patient lives with spouse. Son noted that she has trouble with remembering her nieces' and nephews' names. She would frequently repeat herself around 3 years ago. Son noted that she has well formed visual hallucinations at night and son noted that she would wander out of the house in the middle of the night. Son noted that there was a few times when she had no idea where she is. Denied any tremors. Has 8th grade education. She had quit smoking around 30 years ago. Needs assistance with IADLs.    Age of  Onset - 71 year old     Current Medication -  Namenda XR 14mg daily (5/4/2021 - present)  Exelon 6mg BID (2/25/2021 - present)  Seroquel 25mg qhs (2/25/2021 - present)    Prior Medications -  Namenda XR 21mg daily (9/23/2021 -5/4/2021)    Labs -   TSH 8/25/2020 - 1.064  FT4 11/12/2020 - 0.95  Vitamin B12 level - non on file  Folate - non on file  RPR- non on file    Imaging -   MRI Brain w/o Jadiel 8/29/2016 - I have reviewed the study independently and with the patient. Chronic microvascular changes and atrophy.   EKG 3/2/2021 - QTc 40.8    Review of Systems    Constitutional: no fever, fatigue, weakness  Eye: no vision loss, eye redness, drainage, or pain  ENMT: no sore throat, ear pain, sinus pain/congestion, nasal congestion/drainage  Respiratory: no cough, no wheezing, no shortness of breath  Cardiovascular: no chest pain, no palpitations, no edema  Gastrointestinal: no nausea, vomiting, or diarrhea. No abdominal pain  Genitourinary: no dysuria, no urinary frequency or urgency, no hematuria  Hema/Lymph: no abnormal bruising or bleeding  Endocrine: no heat or cold intolerance, no excessive thirst or excessive urination  Musculoskeletal: no muscle or joint pain, no joint swelling  Integumentary: no skin rash or abnormal lesion  Psychiatric: no depressed mood, no anxiety, + visual/auditory hallucinations, no delusions, no suicidal or homicidal ideation  Neurologic: as per HPI    Objective:      Physical Exam    General: well-developed well-nourished in no acute distress  Eye: clear conjunctiva, eyelids normal  HENT: TMs/ear canals clear, oropharynx without erythema/exudate, oropharynx and nasal mucosal surfaces moist, no maxillary/frontal sinus tenderness to palpation  Neck: full range of motion, no thyromegaly or lymphadenopathy  Respiratory: clear to auscultation bilaterally  Cardiovascular: regular rate and rhythm without murmurs, gallops or rubs  Gastrointestinal: soft, non-tender, non-distended with normal bowel  sounds, without masses to palpation  Genitourinary: no CVA tenderness to palpation  Musculoskeletal: full range of motion of all extremities/spine without limitation or discomfort  Integumentary: no rashes or skin lesions present  Neurologic:  Mental Status- MoCA 5/30, No Dysarthria.  CN II-XII - CN I Deferred, KAVITA, no RAPD, VA grossly normal to finger counting at 6ft, No ptosis b/l, EOMI w/o nystagmus, LT/PP symmetric, intact in CN V1-V3 distribution b/l, masseter symmetric b/l, T/U midline, Shoulder shrug symmetric b/l, Hearing grossly intact b/l, VFFC, Face Symmetric.  Motor - Tone and Bulk nml throughout, No involuntary movements, 5/5 to confrontation throughout, FFM nml b/l, No pronator drift.  Sensory - LT/PP/Temp/Proprioception/Vibration symmetric intact throughout.  Reflexes - 2+ Throughout  Cerebellar Exam - FNF wnl b/l no intention tremor  Gait - antalgic; utilizing cane    Assessment:       1. Lewy body dementia with behavioral disturbance      Plan:       This is a 76 year old Female with hx of HTN, HLD, Glaucoma, who was referred for cognitive decline with pronounce visual hallucinations, concerning of medication side effect Lewy Body Dementia with sun downing. Has been evaluated by geriatrics.     [] c/w Rivastigmine 6mg BID  [] c/w Seroquel to 100mg qhs  [] c/w Oxybutnin 5mg daily for now  [] pt to f/u with family medicine/internal med    I have explained the treatment plan, diagnosis, and prognosis to the patient, caretaker, family. All questions are answered to the best of my knowledge.    Face to Face Time 30 minute, including, counseling, education, review of test results, relevant medical records, and coordination of care.

## 2022-07-28 ENCOUNTER — DOCUMENT SCAN (OUTPATIENT)
Dept: HOME HEALTH SERVICES | Facility: HOSPITAL | Age: 77
End: 2022-07-28
Payer: MEDICARE

## 2022-07-28 ENCOUNTER — OFFICE VISIT (OUTPATIENT)
Dept: FAMILY MEDICINE | Facility: CLINIC | Age: 77
End: 2022-07-28
Payer: MEDICARE

## 2022-07-28 VITALS
TEMPERATURE: 99 F | BODY MASS INDEX: 32.97 KG/M2 | HEART RATE: 72 BPM | SYSTOLIC BLOOD PRESSURE: 151 MMHG | WEIGHT: 186.06 LBS | OXYGEN SATURATION: 97 % | HEIGHT: 63 IN | DIASTOLIC BLOOD PRESSURE: 84 MMHG

## 2022-07-28 DIAGNOSIS — I10 HYPERTENSION, UNSPECIFIED TYPE: ICD-10-CM

## 2022-07-28 DIAGNOSIS — G31.83 LEWY BODY DEMENTIA WITH BEHAVIORAL DISTURBANCE: Primary | ICD-10-CM

## 2022-07-28 DIAGNOSIS — N39.3 STRESS INCONTINENCE OF URINE: ICD-10-CM

## 2022-07-28 DIAGNOSIS — R01.1 SYSTOLIC MURMUR: ICD-10-CM

## 2022-07-28 DIAGNOSIS — F02.818 LEWY BODY DEMENTIA WITH BEHAVIORAL DISTURBANCE: Primary | ICD-10-CM

## 2022-07-28 DIAGNOSIS — E78.5 HYPERLIPIDEMIA, UNSPECIFIED HYPERLIPIDEMIA TYPE: ICD-10-CM

## 2022-07-28 PROCEDURE — 99215 OFFICE O/P EST HI 40 MIN: CPT | Mod: PBBFAC

## 2022-07-28 RX ORDER — RIVASTIGMINE TARTRATE 4.5 MG/1
4.5 CAPSULE ORAL 2 TIMES DAILY
Qty: 60 CAPSULE | Refills: 0 | Status: SHIPPED | OUTPATIENT
Start: 2022-07-28 | End: 2022-10-06

## 2022-07-28 RX ORDER — PRAVASTATIN SODIUM 80 MG/1
40 TABLET ORAL NIGHTLY
Qty: 30 TABLET | Refills: 1 | Status: SHIPPED | OUTPATIENT
Start: 2022-07-28 | End: 2022-10-06

## 2022-07-28 RX ORDER — UNDERPADS 30"X36"
1 EACH MISCELLANEOUS NIGHTLY
Qty: 30 EACH | Refills: 2 | Status: SHIPPED | OUTPATIENT
Start: 2022-07-28

## 2022-07-28 RX ORDER — DICLOFENAC SODIUM 10 MG/G
2 GEL TOPICAL EVERY 6 HOURS PRN
Qty: 50 G | Refills: 0 | Status: SHIPPED | OUTPATIENT
Start: 2022-07-28

## 2022-07-28 RX ORDER — METOPROLOL TARTRATE 50 MG/1
25 TABLET ORAL 2 TIMES DAILY
Qty: 30 TABLET | Refills: 0 | Status: SHIPPED | OUTPATIENT
Start: 2022-07-28 | End: 2022-10-06

## 2022-07-28 NOTE — PROGRESS NOTES
"Chief Complaint  No chief complaint on file.      History of Present Illness  Kathleen Arevalo is a 77 y.o. year old female with h/o Lewy body dementia, HTN, HLD, overactive bladder and glaucoma presents to the clinic accompanied by her daughter Ashley for follow up. Pt lives with her , who has severe problems with his vision and cannot care for Ms. Arevalo too well. Pt has 5 children, however only Ms. Ramirez lives in close proximity, who visits the pt twice a day, but cannot fully care for her mom all alone.   Pt's daughter states that pt has been wandering around at night on the streets around 2-3am "looking for her kids". This has occurred on multiple occasions and the police had to bring her back home. States pt figures out how to open the front door and would just walk out in the middle of the night.  Daughter is concerned for pt's safety and would like to see if an aid or sitter can watch the pt at the house. In addition, there has also been hallucinations, pt is seeing kids and people talk and play, no disturbing or intrusive thoughts however. Daughter mentions that often times Ms. Arevalo would become stubborn, combative, agitated and resisting instructions/directions by trying to "fight back".   Daughter is also concerned that pt is not always taking her prescribed medicines as instructed, as  is not always reliable. Daughter really thinks that the patient would benefit from a skilled personnel or aid, who can help with pt's bathing, medication administration and some basic needs.   Pt herself only c/o L knee pain, otherwise no issues. She cannot recall these wandering episodes.     Of note Pt's urinary incontinence is noted to be much better while on the oxybutynin, Ms Ramirez states that when pt stands up she would see urine dribble on the floor.    Also, Pt denies taking her BP meds this morning. Daughter states she usually leaves the pills in a small container and gives it to pt's  to give " it to pt.         Review of Systems  ROS-as noted in HPI, somewhat limited d/t underlying dementia    Physical Exam  Physical Exam  Vitals reviewed.   Constitutional:       General: She is not in acute distress.     Appearance: Normal appearance.      Comments: Rooms slightly smells of urine  Pt's shirt is soiled   HENT:      Head: Atraumatic.   Eyes:      General: Lids are normal.      Extraocular Movements: Extraocular movements intact.   Cardiovascular:      Rate and Rhythm: Normal rate and regular rhythm.      Heart sounds: Murmur heard.    Systolic murmur is present with a grade of 1/6.  Pulmonary:      Effort: Pulmonary effort is normal.      Breath sounds: Normal breath sounds.   Musculoskeletal:      Right knee: No swelling or deformity. No tenderness.      Left knee: No swelling, deformity, ecchymosis or lacerations. Tenderness present.      Right lower leg: No edema.      Left lower leg: No edema.   Skin:     Findings: No rash.   Neurological:      Mental Status: She is disoriented and confused.      Comments: Only oriented to self, and place  Disoriented to year, did not recognize her daughter in the room  Stated we (pointing at providers) gave her her BP meds this morning   Psychiatric:         Attention and Perception: Attention normal.         Mood and Affect: Affect normal.         Speech: Speech normal.         Behavior: Behavior is not agitated. Behavior is cooperative.      Comments: Confused thought content  Pleasant         There were no vitals filed for this visit.  Wt Readings from Last 2 Encounters:   07/19/22 84.4 kg (186 lb 1.1 oz)   06/23/22 85.2 kg (187 lb 13.3 oz)         Current Outpatient Medications  Outpatient Medications as of 7/28/2022   Medication Sig Dispense Refill    amLODIPine (NORVASC) 10 MG tablet Take 10 mg by mouth once daily.      diclofenac (VOLTAREN) 75 MG EC tablet Take 75 mg by mouth 2 (two) times daily with meals.      diclofenac sodium (VOLTAREN) 1 % Gel Apply 2 g  "topically every 6 (six) hours as needed for Pain.      hydrOXYzine HCL (ATARAX) 25 MG tablet Take 25 mg by mouth every evening.      latanoprost 0.005 % ophthalmic solution Place 1 drop into both eyes every evening. Place 1 drop into both eyes every evening. 2.5 mL 12    losartan (COZAAR) 25 MG tablet Take 25 mg by mouth once daily.      metoprolol tartrate (LOPRESSOR) 50 MG tablet Take 50 mg by mouth 2 (two) times daily.      oxybutynin (DITROPAN) 5 MG Tab Take 1 tablet (5 mg total) by mouth once daily. 90 tablet 0    pravastatin (PRAVACHOL) 80 MG tablet Take 1 tablet by mouth every evening.      QUEtiapine (SEROQUEL) 100 MG Tab Take 1 tablet (100 mg total) by mouth every evening. 60 tablet 0    rivastigmine tartrate (EXELON) 6 mg capsule Take 6 mg by mouth 2 (two) times a day.      spironolactone (ALDACTONE) 25 MG tablet Take 25 mg by mouth once daily.       No current facility-administered medications on file as of 7/28/2022.             Assessment / Plan:    Lewy body dementia with behavioral disturbance  - Increased behavioral issues likely secondary to polypharmacy  - HepC, HepB nonreactive, HIV, RPR negative, vit D, vit B12, folate levels checked 1 month ago and all was wnl  - Used to be followed at neuro clinic for cognitive decline, visual hallucinations, concern for sun-downing d/t medication side effects but was told not to make further appointments as "there is nothing additional they can do" for the pt.   - Consult for  placed to request assistance for skilled medication management and home aid. Pt is already seen by Kamille BARRETT.   - At this visit, the following changes are made to pt's current medication list/dosages/frequency to cut down pill burden and to minimize possible side effects, which could contribute to pt's behavioral changes:     1. Decrease Metoprolol 50mg to 25mg BID (no known cardiac history)  2. Stop the anxiety medicine Hydroxyzine (avoidance of anticholinergics " in elderly)  3. Decrease dose of Rivastigmine from 6mg to 4.5mg BID, as this medicine is indicated for dementia associated with Alzheimer's rather than Lewy body dementia. Will try to slowly wean, in 2 weeks, decrease dose to 3mg BID.    4. Continue with Seroquel 100 for agitation (recent dose increase during last geriatric clinic visit on 6/23/22), however considering this dose is already quite high, we might consider slowly switching to Depakote instead of Seroquel      Stress incontinence of urine  - Based on daughter's sx description, incontinence likely stress incontinence in etiology  - Try taking Oxybutynin only during day and monitor how it affects the incontinence (avoidance of anticholinergics in the elderly). If sx aren't worse, consider stopping oxybutynin all together  - Rx sent for Depends and bed underpads      Hyperlipidemia, unspecified hyperlipidemia type     - Decrease dose of Pravastatin  from 80mg to 40mg, as there is no indication for high intensity statin in this elderly, severely demented patient, given no significant cardiac history and lipid profile that is highly within normal limits.       Hypertension, unspecified type  - Pt did not take her BP meds this morning  - /84 at clinic  - Recommended pt take her BP measurements 2-3 hours after taking her Norvasc and Losartan dose in the a.m  - If remains elevated, consider switching Lopressor to Coreg 3.125 BID for added alpha blockade and subsequent BP control      Systolic murmur  - Pt is asymptomatic, likely 2/2 aortic stenosis  - No echo on file, considering pursuing workup at next visit            Follow up:    In 2 weeks to evaluate how pt is doing after changes were made to some medicines. Return to clinic sooner if needed.     Alina Adam M.D.  [facility] PGY-2          Answers for HPI/ROS submitted by the patient on 7/27/2022  activity change: No  unexpected weight change: No  rhinorrhea: No  trouble swallowing: No  visual  disturbance: No  chest tightness: No  polyuria: No  difficulty urinating: No  menstrual problem: No  joint swelling: No  arthralgias: No  confusion: No  dysphoric mood: No

## 2022-07-28 NOTE — PATIENT INSTRUCTIONS
Changes to your medicines    Decrease dose of Metoprolol from 50mg to 25mg twice daily  Try to stop taking Oxybutinin for the bladder, and see how the incontinence is or try taking it during the day  Stop the anxiety medicine Hydroxyzine  Decrease dose of Rivastigmine from 6mg to 4.5mg twice daily  Decrease dose of Pravastatin cholesterol medicine from 80mg to 40mg

## 2022-08-09 ENCOUNTER — HOSPITAL ENCOUNTER (OUTPATIENT)
Dept: RADIOLOGY | Facility: HOSPITAL | Age: 77
Discharge: HOME | End: 2022-08-09
Attending: STUDENT IN AN ORGANIZED HEALTH CARE EDUCATION/TRAINING PROGRAM
Payer: MEDICARE

## 2022-08-09 DIAGNOSIS — R41.3 OTHER AMNESIA: ICD-10-CM

## 2022-08-09 PROCEDURE — 70551 MRI BRAIN STEM W/O DYE: CPT | Mod: TC

## 2022-10-05 RX ORDER — METOPROLOL TARTRATE 50 MG/1
25 TABLET ORAL 2 TIMES DAILY
Qty: 90 TABLET | Refills: 1 | Status: CANCELLED | OUTPATIENT
Start: 2022-10-05 | End: 2023-01-03

## 2022-10-06 ENCOUNTER — OFFICE VISIT (OUTPATIENT)
Dept: FAMILY MEDICINE | Facility: CLINIC | Age: 77
End: 2022-10-06
Payer: MEDICARE

## 2022-10-06 VITALS
OXYGEN SATURATION: 98 % | TEMPERATURE: 98 F | HEART RATE: 63 BPM | HEIGHT: 63 IN | WEIGHT: 177.5 LBS | DIASTOLIC BLOOD PRESSURE: 66 MMHG | BODY MASS INDEX: 31.45 KG/M2 | RESPIRATION RATE: 20 BRPM | SYSTOLIC BLOOD PRESSURE: 146 MMHG

## 2022-10-06 DIAGNOSIS — R30.0 DYSURIA: ICD-10-CM

## 2022-10-06 DIAGNOSIS — E78.5 HYPERLIPIDEMIA, UNSPECIFIED HYPERLIPIDEMIA TYPE: ICD-10-CM

## 2022-10-06 DIAGNOSIS — Z12.31 ENCOUNTER FOR SCREENING MAMMOGRAM FOR MALIGNANT NEOPLASM OF BREAST: ICD-10-CM

## 2022-10-06 DIAGNOSIS — N30.00 ACUTE CYSTITIS WITHOUT HEMATURIA: ICD-10-CM

## 2022-10-06 DIAGNOSIS — I10 HYPERTENSION, UNSPECIFIED TYPE: Primary | ICD-10-CM

## 2022-10-06 DIAGNOSIS — Z13.9 SCREENING DUE: ICD-10-CM

## 2022-10-06 DIAGNOSIS — G31.83 LEWY BODY DEMENTIA WITH BEHAVIORAL DISTURBANCE: ICD-10-CM

## 2022-10-06 DIAGNOSIS — E66.9 OBESITY, UNSPECIFIED CLASSIFICATION, UNSPECIFIED OBESITY TYPE, UNSPECIFIED WHETHER SERIOUS COMORBIDITY PRESENT: ICD-10-CM

## 2022-10-06 DIAGNOSIS — R01.1 SYSTOLIC MURMUR: ICD-10-CM

## 2022-10-06 DIAGNOSIS — F02.818 LEWY BODY DEMENTIA WITH BEHAVIORAL DISTURBANCE: ICD-10-CM

## 2022-10-06 LAB
APPEARANCE UR: ABNORMAL
BACTERIA #/AREA URNS AUTO: ABNORMAL /HPF
BILIRUB UR QL STRIP.AUTO: NEGATIVE MG/DL
CHOLEST SERPL-MCNC: 149 MG/DL
CHOLEST/HDLC SERPL: 4 {RATIO} (ref 0–5)
COLOR UR AUTO: ABNORMAL
GLUCOSE UR QL STRIP.AUTO: NORMAL MG/DL
HDLC SERPL-MCNC: 35 MG/DL (ref 35–60)
HYALINE CASTS #/AREA URNS LPF: ABNORMAL /LPF
KETONES UR QL STRIP.AUTO: NEGATIVE MG/DL
LDLC SERPL CALC-MCNC: 96 MG/DL (ref 50–140)
LEUKOCYTE ESTERASE UR QL STRIP.AUTO: 25 UNIT/L
MUCOUS THREADS URNS QL MICRO: ABNORMAL /LPF
NITRITE UR QL STRIP.AUTO: ABNORMAL
PH UR STRIP.AUTO: 5.5 [PH]
PROT UR QL STRIP.AUTO: ABNORMAL MG/DL
RBC #/AREA URNS AUTO: ABNORMAL /HPF
RBC UR QL AUTO: ABNORMAL UNIT/L
SP GR UR STRIP.AUTO: 1.02
SQUAMOUS #/AREA URNS LPF: ABNORMAL /HPF
TRIGL SERPL-MCNC: 90 MG/DL (ref 37–140)
UROBILINOGEN UR STRIP-ACNC: NORMAL MG/DL
VLDLC SERPL CALC-MCNC: 18 MG/DL
WBC #/AREA URNS AUTO: ABNORMAL /HPF

## 2022-10-06 PROCEDURE — 80061 LIPID PANEL: CPT

## 2022-10-06 PROCEDURE — 87077 CULTURE AEROBIC IDENTIFY: CPT

## 2022-10-06 PROCEDURE — 36415 COLL VENOUS BLD VENIPUNCTURE: CPT

## 2022-10-06 PROCEDURE — 81001 URINALYSIS AUTO W/SCOPE: CPT

## 2022-10-06 PROCEDURE — 99214 OFFICE O/P EST MOD 30 MIN: CPT | Mod: PBBFAC

## 2022-10-06 RX ORDER — METOPROLOL SUCCINATE 25 MG/1
25 TABLET, EXTENDED RELEASE ORAL DAILY
Qty: 90 TABLET | Refills: 1 | Status: ON HOLD | OUTPATIENT
Start: 2022-10-06 | End: 2023-05-30 | Stop reason: ALTCHOICE

## 2022-10-06 RX ORDER — RIVASTIGMINE TARTRATE 3 MG/1
3 CAPSULE ORAL 2 TIMES DAILY
Qty: 180 CAPSULE | Refills: 1 | Status: SHIPPED | OUTPATIENT
Start: 2022-10-06 | End: 2023-07-16

## 2022-10-06 RX ORDER — QUETIAPINE FUMARATE 25 MG/1
25 TABLET, FILM COATED ORAL DAILY PRN
Qty: 30 TABLET | Refills: 0 | Status: ON HOLD | OUTPATIENT
Start: 2022-10-06 | End: 2023-06-06 | Stop reason: HOSPADM

## 2022-10-06 RX ORDER — OXYBUTYNIN CHLORIDE 5 MG/1
5 TABLET ORAL DAILY
Qty: 90 TABLET | Refills: 1 | Status: SHIPPED | OUTPATIENT
Start: 2022-10-06 | End: 2022-10-06

## 2022-10-06 RX ORDER — METOPROLOL TARTRATE 25 MG/1
25 TABLET, FILM COATED ORAL 2 TIMES DAILY
Qty: 180 TABLET | Refills: 1 | Status: CANCELLED | OUTPATIENT
Start: 2022-10-06 | End: 2023-04-04

## 2022-10-06 RX ORDER — ATORVASTATIN CALCIUM 40 MG/1
40 TABLET, FILM COATED ORAL DAILY
Qty: 90 TABLET | Refills: 3 | Status: SHIPPED | OUTPATIENT
Start: 2022-10-06 | End: 2023-10-06

## 2022-10-06 NOTE — PROGRESS NOTES
Chief Complaint  Follow-up and Memory Loss      History of Present Illness  Kathleen Arevalo is a 77 y.o. year old female with h/o Lewy body dementia, HTN, HLD, overactive bladder and glaucoma presents to the clinic accompanied by her daughter for follow up.   LCV: 7/22  Daughter states that her memory issues have been stable, continues to have occasional visual pleasant hallucinations, occasionally wanders around home very occasionally but has been stable and manageable.  Daughter also states that patient has been having frequent urine accident, concerns for possible dysuria.    Today requests refills for metoprolol,  and prescription for oxybutynin.  Patient have 5 children, recently 1 of her sons came in to help her, stayed with her for a month and patient did good during that month.  Patient does have worsening hallucinations/episodes of wandering when she moves out in unfamiliar household.  Patient about to visit her son for at least about 6 weeks in [place].  Per daughter family is against nursing home, patient does not have any living will and she is not mentally stable to make her medical decisions.    Discussed code status with patient's daughter, mentioned she wants her mom to be full code for now.    Review of Systems  ROS-as noted in HPI, somewhat limited d/t underlying dementia    Physical Exam  Vitals reviewed.   Constitutional:       General: She is not in acute distress.     Appearance: Normal appearance.   HENT:      Head: Normocephalic and atraumatic.      Mouth/Throat:      Mouth: Mucous membranes are moist.   Eyes:      General: Lids are normal.      Extraocular Movements: Extraocular movements intact.      Conjunctiva/sclera: Conjunctivae normal.      Pupils: Pupils are equal, round, and reactive to light.   Cardiovascular:      Rate and Rhythm: Normal rate and regular rhythm.      Heart sounds: No murmur heard.  Pulmonary:      Effort: Pulmonary effort is normal.      Breath sounds: Normal breath  sounds.   Musculoskeletal:      Cervical back: Normal range of motion.      Right knee: No swelling or deformity. No tenderness.      Left knee: No swelling, deformity, ecchymosis or lacerations. Tenderness present.      Right lower leg: No edema.      Left lower leg: No edema.   Skin:     Findings: No rash.   Neurological:      Mental Status: She is disoriented and confused.      Comments: Only oriented to self, and place  Disoriented to year, did not recognize her daughter in the room     Psychiatric:         Attention and Perception: Attention normal.         Mood and Affect: Affect normal.         Speech: Speech normal.         Behavior: Behavior is not agitated. Behavior is cooperative.      Comments: Confused thought content  Pleasant       There were no vitals filed for this visit.  Wt Readings from Last 2 Encounters:   07/28/22 84.4 kg (186 lb 1.1 oz)   07/19/22 84.4 kg (186 lb 1.1 oz)         Current Outpatient Medications  Outpatient Medications as of 10/6/2022   Medication Sig Dispense Refill    amLODIPine (NORVASC) 10 MG tablet Take 10 mg by mouth once daily.      diaper,brief,adult,disposable Misc 1 each by Misc.(Non-Drug; Combo Route) route 2 (two) times a day. 20 each 1    diclofenac (VOLTAREN) 75 MG EC tablet Take 75 mg by mouth 2 (two) times daily with meals.      diclofenac sodium (VOLTAREN) 1 % Gel Apply 2 g topically every 6 (six) hours as needed. 50 g 0    latanoprost 0.005 % ophthalmic solution Place 1 drop into both eyes every evening. Place 1 drop into both eyes every evening. 2.5 mL 12    losartan (COZAAR) 25 MG tablet Take 25 mg by mouth once daily.      metoprolol tartrate (LOPRESSOR) 50 MG tablet Take 0.5 tablets (25 mg total) by mouth 2 (two) times daily. 30 tablet 0    oxybutynin (DITROPAN) 5 MG Tab Take 1 tablet (5 mg total) by mouth once daily. 90 tablet 0    pravastatin (PRAVACHOL) 80 MG tablet Take 0.5 tablets (40 mg total) by mouth every evening. 30 tablet 1    QUEtiapine  "(SEROQUEL) 100 MG Tab Take 1 tablet (100 mg total) by mouth every evening. 60 tablet 0    rivastigmine tartrate 4.5 mg capsule Take 1 capsule (4.5 mg total) by mouth 2 (two) times a day. 60 capsule 0    spironolactone (ALDACTONE) 25 MG tablet Take 25 mg by mouth once daily.      underpads 30 X 36 " Pads 1 each by Misc.(Non-Drug; Combo Route) route every evening. 30 each 2     No current facility-administered medications on file as of 10/6/2022.             Assessment / Plan:    Lewy body dementia with behavioral disturbance  - Increased behavioral issues likely secondary to polypharmacy  - HepC, HepB nonreactive, HIV, RPR negative, vit D, vit B12, folate levels checked 1 month ago and all was wnl  - Used to be followed at neuro clinic for cognitive decline, visual hallucinations, concern for sun-downing d/t medication side effects but was told not to make further appointments as "there is nothing additional they can do" for the pt.   - Consult for  placed to request assistance for skilled medication management and home aid. Pt is already seen by Kamille BARRETT.   - At this visit, the following changes are made to pt's current medication list/dosages/frequency to cut down pill burden and to minimize possible side effects, which could contribute to pt's behavioral changes:     Continue metoprolol, switching to Toprol-XL 25 daily (equivalent dose of prior metoprolol tartrate 25 b.i.d.)   Patient currently taking rivastigmine 3 mg b.i.d., dementia continues to be stable.  Continue with Seroquel 100 for agitation at nighttime.  Patient doing well overall on this current regimen  Also prescribed Seroquel 25 mg as needed for non directable agitation in the morning (not to take more than once during the day)      Stress/urge incontinence of urine  patient's daughter asking for oxybutynin prescriptions, however explained in detail about the adverse effects of medications.  Appropriate instructions given for " stress/urge incontinence.    To avoid oxybutynin for now, patient's daughter verbalized understanding.      Hyperlipidemia, unspecified hyperlipidemia type  switching pravastatin to atorvastatin 40 mg daily.        Hypertension, unspecified type  Blood pressure slightly elevated during hospital visit today, reportedly per daughter to call her medications this morning.    However patient ran out of Lopressor prescription about 4 days ago.    Given her multiple comorbidities will continue the same medications for now.        Dysuria.    Will obtain UA, urine  cultures    Health Maintenance  Referral sent for mammogram today.    Will also get routine labs              Follow up:   3 months  Medications reconciled refilled and discussed with patient/daughter.    Will get labs today.    Return to clinic sooner if needed.

## 2022-10-08 LAB — BACTERIA UR CULT: ABNORMAL

## 2022-10-10 ENCOUNTER — TELEPHONE (OUTPATIENT)
Dept: FAMILY MEDICINE | Facility: CLINIC | Age: 77
End: 2022-10-10
Payer: MEDICARE

## 2022-10-10 RX ORDER — NITROFURANTOIN 25; 75 MG/1; MG/1
100 CAPSULE ORAL 2 TIMES DAILY
Qty: 6 CAPSULE | Refills: 0 | Status: SHIPPED | OUTPATIENT
Start: 2022-10-10 | End: 2022-10-13

## 2022-10-10 NOTE — TELEPHONE ENCOUNTER
Dr Swain, I spoke to the patients daughter Ashley and informed her that she has a UTI, I also informed her that antibiotics were sent to [facility] in [place] and that she should start taking it today.

## 2022-10-10 NOTE — PROGRESS NOTES
F/u on UA nad Urine Cx noted to have simple Uti, prescribed macrobid for 3 days.  Pls inform pt.   Thanks.    Liz Coronado MD  [employer] IM Resident PGY-3

## 2022-10-18 ENCOUNTER — HOSPITAL ENCOUNTER (OUTPATIENT)
Dept: RADIOLOGY | Facility: HOSPITAL | Age: 77
Discharge: HOME | End: 2022-10-18
Attending: STUDENT IN AN ORGANIZED HEALTH CARE EDUCATION/TRAINING PROGRAM
Payer: MEDICARE

## 2022-10-18 DIAGNOSIS — G31.83 LEWY BODY DEMENTIA WITH BEHAVIORAL DISTURBANCE: ICD-10-CM

## 2022-10-18 DIAGNOSIS — I10 HYPERTENSION, UNSPECIFIED TYPE: ICD-10-CM

## 2022-10-18 DIAGNOSIS — Z13.9 SCREENING DUE: ICD-10-CM

## 2022-10-18 DIAGNOSIS — E78.5 HYPERLIPIDEMIA, UNSPECIFIED HYPERLIPIDEMIA TYPE: ICD-10-CM

## 2022-10-18 DIAGNOSIS — Z12.31 ENCOUNTER FOR SCREENING MAMMOGRAM FOR MALIGNANT NEOPLASM OF BREAST: ICD-10-CM

## 2022-10-18 DIAGNOSIS — F02.818 LEWY BODY DEMENTIA WITH BEHAVIORAL DISTURBANCE: ICD-10-CM

## 2022-10-18 DIAGNOSIS — E66.9 OBESITY, UNSPECIFIED CLASSIFICATION, UNSPECIFIED OBESITY TYPE, UNSPECIFIED WHETHER SERIOUS COMORBIDITY PRESENT: ICD-10-CM

## 2022-10-18 DIAGNOSIS — R01.1 SYSTOLIC MURMUR: ICD-10-CM

## 2022-10-18 PROCEDURE — 77063 MAMMO DIGITAL SCREENING BILAT WITH TOMO: ICD-10-PCS | Mod: 26,,, | Performed by: RADIOLOGY

## 2022-10-18 PROCEDURE — 77067 SCR MAMMO BI INCL CAD: CPT | Mod: 26,,, | Performed by: RADIOLOGY

## 2022-10-18 PROCEDURE — 77067 SCR MAMMO BI INCL CAD: CPT | Mod: TC

## 2022-10-18 PROCEDURE — 77063 BREAST TOMOSYNTHESIS BI: CPT | Mod: 26,,, | Performed by: RADIOLOGY

## 2022-10-18 PROCEDURE — 77067 MAMMO DIGITAL SCREENING BILAT WITH TOMO: ICD-10-PCS | Mod: 26,,, | Performed by: RADIOLOGY

## 2022-11-30 RX ORDER — DICLOFENAC SODIUM 75 MG/1
75 TABLET, DELAYED RELEASE ORAL 2 TIMES DAILY
Qty: 60 TABLET | Refills: 0 | Status: SHIPPED | OUTPATIENT
Start: 2022-11-30 | End: 2022-12-30

## 2022-11-30 RX ORDER — SPIRONOLACTONE 25 MG/1
25 TABLET ORAL DAILY
Qty: 90 TABLET | Refills: 1 | Status: ON HOLD | OUTPATIENT
Start: 2022-11-30 | End: 2023-06-06 | Stop reason: HOSPADM

## 2023-01-04 ENCOUNTER — OFFICE VISIT (OUTPATIENT)
Dept: FAMILY MEDICINE | Facility: CLINIC | Age: 78
End: 2023-01-04
Payer: MEDICARE

## 2023-01-04 VITALS
HEIGHT: 63 IN | DIASTOLIC BLOOD PRESSURE: 61 MMHG | SYSTOLIC BLOOD PRESSURE: 131 MMHG | WEIGHT: 172.38 LBS | OXYGEN SATURATION: 99 % | TEMPERATURE: 98 F | HEART RATE: 61 BPM | RESPIRATION RATE: 20 BRPM | BODY MASS INDEX: 30.54 KG/M2

## 2023-01-04 DIAGNOSIS — G31.83 LEWY BODY DEMENTIA WITH BEHAVIORAL DISTURBANCE: ICD-10-CM

## 2023-01-04 DIAGNOSIS — M17.0 PRIMARY OSTEOARTHRITIS OF BOTH KNEES: Primary | ICD-10-CM

## 2023-01-04 DIAGNOSIS — F02.818 LEWY BODY DEMENTIA WITH BEHAVIORAL DISTURBANCE: ICD-10-CM

## 2023-01-04 DIAGNOSIS — G47.01 INSOMNIA DUE TO MEDICAL CONDITION: ICD-10-CM

## 2023-01-04 PROCEDURE — 99214 OFFICE O/P EST MOD 30 MIN: CPT | Mod: PBBFAC

## 2023-01-04 RX ORDER — TRAZODONE HYDROCHLORIDE 50 MG/1
TABLET ORAL
Qty: 83 TABLET | Refills: 0 | Status: SHIPPED | OUTPATIENT
Start: 2023-01-04 | End: 2023-05-19 | Stop reason: SDUPTHER

## 2023-01-04 RX ORDER — MELOXICAM 7.5 MG/1
7.5 TABLET ORAL DAILY
Qty: 90 TABLET | Refills: 1 | Status: ON HOLD | OUTPATIENT
Start: 2023-01-04 | End: 2023-06-06 | Stop reason: HOSPADM

## 2023-01-04 NOTE — PATIENT INSTRUCTIONS
For knee pain - Start mobic 7.5mg daily for knee pain - take at the end of meal with food  Continue voltaren gel twice a day     For afternoon sundowning  - continue seroquel 25mg daily 2-3pm     For sleep -  STOP night time quetiapine /seroquel (100mg)   START trazodone 25mg  (1/2 tablet) 1 hr before sleep time (9-10pm) for 2 weeks and monitor effect, can increase to full pill if not working well.   Look for - melatonin 10mg ODT - dissolving tab  1 hr before sleep    Consider tapering off rivastigmine/exelon at next visit.

## 2023-01-04 NOTE — PROGRESS NOTES
[facility]  [facility]    DOS: 1/4/2023      Subjective:  Chief Complaint:    Chief Complaint   Patient presents with    Knee Pain       History of Present Illness:  Kathleen Eckert is a 77 y.o. female with a PMH of Lewy Body Dementia, HTN, HLD, osteoarthritis, overactive bladder and glaucoma    Who presents today for:  Follow up of Chronic Conditions: Behavioral disturbances, Arthritis    Daughter present who provides most of history.     Patient complaining of bilateral knee pain R> L.  Has chronic osteoarthritis, daughter states that voltaren gel and tylenol arthritis does not help as patient continues to complain of pain.    Daughter is concerned this is contributing to worsening behaviors.    Patient is acting out around 4-5pm consistently and unable to be redirected.  We discussed sundowning in detail.  Patient also not sleeping well at night, patient on high dose seroquel given after dinner, patient waking up and pacing the house.  Previous visit dose was increased from 50mg to 100mg and daughter feels it has not helped her sleep at night.         Past Medical History:   Diagnosis Date    Cellulitis     Hypertension       Past Surgical History:   Procedure Laterality Date    HYSTERECTOMY        Family History   Problem Relation Age of Onset    Obesity Mother     Heart disease Father     Obesity Father     Dementia Maternal Aunt     Aneurysm Neg Hx     Cancer Neg Hx     Clotting disorder Neg Hx     Diabetes Neg Hx     Fainting Neg Hx     Hyperlipidemia Neg Hx     Kidney disease Neg Hx     Liver disease Neg Hx     Migraines Neg Hx     Neuropathy Neg Hx     Parkinsonism Neg Hx     Seizures Neg Hx     Stroke Neg Hx     Tremor Neg Hx       Social History     Socioeconomic History    Marital status:      Spouse name: srinivasa eckert    Number of children: 5   Occupational History    Occupation: retired   Tobacco Use    Smoking status: Former     Passive exposure:  "Never    Smokeless tobacco: Never   Substance and Sexual Activity    Alcohol use: Not Currently     Comment: occ    Drug use: Not Currently    Sexual activity: Not Currently     Partners: Male        Review of patient's allergies indicates:  No Known Allergies     Current Outpatient Medications:     amLODIPine (NORVASC) 10 MG tablet, Take 10 mg by mouth once daily., Disp: , Rfl:     atorvastatin (LIPITOR) 40 MG tablet, Take 1 tablet (40 mg total) by mouth once daily., Disp: 90 tablet, Rfl: 3    diaper,brief,adult,disposable Misc, 1 each by Misc.(Non-Drug; Combo Route) route 2 (two) times a day., Disp: 20 each, Rfl: 1    diclofenac sodium (VOLTAREN) 1 % Gel, Apply 2 g topically every 6 (six) hours as needed., Disp: 50 g, Rfl: 0    latanoprost 0.005 % ophthalmic solution, Place 1 drop into both eyes every evening. Place 1 drop into both eyes every evening., Disp: 2.5 mL, Rfl: 12    losartan (COZAAR) 25 MG tablet, Take 25 mg by mouth once daily., Disp: , Rfl:     metoprolol succinate (TOPROL-XL) 25 MG 24 hr tablet, Take 1 tablet (25 mg total) by mouth once daily., Disp: 90 tablet, Rfl: 1    QUEtiapine (SEROQUEL) 100 MG Tab, Take 1 tablet (100 mg total) by mouth every evening., Disp: 60 tablet, Rfl: 0    QUEtiapine (SEROQUEL) 25 MG Tab, Take 1 tablet (25 mg total) by mouth daily as needed (Non directable agitation)., Disp: 30 tablet, Rfl: 0    rivastigmine tartrate (EXELON) 3 MG capsule, Take 1 capsule (3 mg total) by mouth 2 (two) times daily., Disp: 180 capsule, Rfl: 1    spironolactone (ALDACTONE) 25 MG tablet, Take 1 tablet (25 mg total) by mouth once daily., Disp: 90 tablet, Rfl: 1    underpads 30 X 36 " Pads, 1 each by Misc.(Non-Drug; Combo Route) route every evening., Disp: 30 each, Rfl: 2     Review of Systems:  Review of Systems   HENT:  Negative for hearing loss.    Eyes:  Negative for discharge.   Respiratory:  Negative for wheezing.    Cardiovascular:  Negative for chest pain and palpitations. " "  Gastrointestinal:  Negative for blood in stool, constipation, diarrhea and vomiting.   Genitourinary:  Negative for dysuria and hematuria.   Musculoskeletal:  Negative for neck pain.   Neurological:  Negative for weakness and headaches.   Endo/Heme/Allergies:  Negative for polydipsia.    Answers submitted by the patient for this visit:  Review of Systems Questionnaire (Submitted on 12/30/2022)  activity change: No  unexpected weight change: No  rhinorrhea: No  trouble swallowing: No  visual disturbance: No  chest tightness: No  polyuria: No  difficulty urinating: No  menstrual problem: No  joint swelling: No  arthralgias: No  confusion: No  dysphoric mood: No    Objective:   Vitals:    01/04/23 1458   BP: 131/61   BP Location: Left arm   Patient Position: Sitting   BP Method: Medium (Automatic)   Pulse: 61   Resp: 20   Temp: 98.2 °F (36.8 °C)   TempSrc: Oral   SpO2: 99%   Weight: 78.2 kg (172 lb 6.4 oz)   Height: 5' 2.99" (1.6 m)        Physical Exam  Constitutional:       General: She is not in acute distress.     Appearance: Normal appearance.   HENT:      Head: Normocephalic and atraumatic.      Right Ear: External ear normal.      Left Ear: External ear normal.      Nose: Nose normal. No congestion or rhinorrhea.      Mouth/Throat:      Mouth: Mucous membranes are moist.      Pharynx: Oropharynx is clear.      Comments: Dyskinesias with lip smacking noted  Eyes:      Extraocular Movements: Extraocular movements intact.      Conjunctiva/sclera: Conjunctivae normal.      Pupils: Pupils are equal, round, and reactive to light.   Cardiovascular:      Rate and Rhythm: Normal rate and regular rhythm.      Pulses: Normal pulses.      Heart sounds: Normal heart sounds. No murmur heard.  Pulmonary:      Effort: Pulmonary effort is normal. No respiratory distress.      Breath sounds: Normal breath sounds. No wheezing, rhonchi or rales.   Abdominal:      General: Abdomen is flat. Bowel sounds are normal. There is no " distension.      Palpations: Abdomen is soft.      Tenderness: There is no abdominal tenderness.   Musculoskeletal:         General: Swelling and tenderness present.      Cervical back: Normal range of motion.      Comments: Mild swelling to R knee, no erythema or warmth, crepitus to bilateral knees on passive movement with mild pain elicited   Skin:     General: Skin is warm and dry.   Neurological:      General: No focal deficit present.      Mental Status: She is alert. Mental status is at baseline. She is disoriented.         Recent labs:  CBC:  Lab Results   Component Value Date    WBC 7.8 05/19/2022    RBC 4.26 05/19/2022    HGB 12.9 05/19/2022    HCT 39.3 05/19/2022    MCV 92.3 05/19/2022    MCH 30.3 05/19/2022    MCHC 32.8 (L) 05/19/2022    RDW 12.6 05/19/2022     05/19/2022    MPV 9.3 (L) 05/19/2022    GRAN 2.3 08/17/2016    GRAN 45.4 08/17/2016    LYMPH 2.0 08/17/2016    LYMPH 40.1 08/17/2016    MONO 0.5 08/17/2016    MONO 9.3 08/17/2016    EOS 0.2 08/17/2016    BASO 0.00 08/17/2016    EOSINOPHIL 4.8 08/17/2016    BASOPHIL 0.4 08/17/2016      CMP:  Sodium   Date Value Ref Range Status   08/17/2016 140 136 - 145 mmol/L Final     Sodium Level   Date Value Ref Range Status   05/19/2022 139 136 - 145 mmol/L Final     Potassium   Date Value Ref Range Status   08/17/2016 4.2 3.5 - 5.1 mmol/L Final     Potassium Level   Date Value Ref Range Status   05/19/2022 3.9 3.5 - 5.1 mmol/L Final     Chloride   Date Value Ref Range Status   08/17/2016 107 95 - 110 mmol/L Final     CO2   Date Value Ref Range Status   08/17/2016 27 23 - 29 mmol/L Final     Carbon Dioxide   Date Value Ref Range Status   05/19/2022 28 23 - 31 mmol/L Final     Glucose   Date Value Ref Range Status   08/17/2016 99 70 - 110 mg/dL Final     BUN   Date Value Ref Range Status   08/17/2016 24 (H) 8 - 23 mg/dL Final     Blood Urea Nitrogen   Date Value Ref Range Status   05/19/2022 30.7 (H) 9.8 - 20.1 mg/dL Final     Creatinine   Date Value Ref  Range Status   05/19/2022 0.93 0.55 - 1.02 mg/dL Final   08/17/2016 0.9 0.5 - 1.4 mg/dL Final     Calcium   Date Value Ref Range Status   08/17/2016 8.7 8.7 - 10.5 mg/dL Final     Calcium Level Total   Date Value Ref Range Status   05/19/2022 9.9 8.4 - 10.2 mg/dL Final     Total Protein   Date Value Ref Range Status   08/17/2016 7.2 6.0 - 8.4 g/dL Final     Albumin   Date Value Ref Range Status   08/17/2016 3.8 3.5 - 5.2 g/dL Final     Albumin Level   Date Value Ref Range Status   05/19/2022 3.9 3.4 - 4.8 gm/dL Final     Total Bilirubin   Date Value Ref Range Status   08/17/2016 0.6 0.1 - 1.0 mg/dL Final     Comment:     For infants and newborns, interpretation of results should be based  on gestational age, weight and in agreement with clinical  observations.  Premature Infant recommended reference ranges:  Up to 24 hours.............<8.0 mg/dL  Up to 48 hours............<12.0 mg/dL  3-5 days..................<15.0 mg/dL  6-29 days.................<15.0 mg/dL       Bilirubin Total   Date Value Ref Range Status   05/19/2022 0.3 <=1.5 mg/dL Final     Alkaline Phosphatase   Date Value Ref Range Status   05/19/2022 125 40 - 150 unit/L Final   08/17/2016 86 55 - 135 U/L Final     AST   Date Value Ref Range Status   08/17/2016 32 10 - 40 U/L Final     Aspartate Aminotransferase   Date Value Ref Range Status   05/19/2022 23 5 - 34 unit/L Final     ALT   Date Value Ref Range Status   08/17/2016 25 10 - 44 U/L Final     Alanine Aminotransferase   Date Value Ref Range Status   05/19/2022 18 0 - 55 unit/L Final     Anion Gap   Date Value Ref Range Status   08/17/2016 6 (L) 8 - 16 mmol/L Final     eGFR if    Date Value Ref Range Status   08/17/2016 >60.0 >60 mL/min/1.73 m^2 Final     eGFR if non    Date Value Ref Range Status   08/17/2016 >60.0 >60 mL/min/1.73 m^2 Final     Comment:     Calculation used to obtain the estimated glomerular filtration  rate (eGFR) is the CKD-EPI equation. Since  race is unknown   in our information system, the eGFR values for   -American and Non--American patients are given   for each creatinine result.       Estimated GFR-Non    Date Value Ref Range Status   11/12/2020 84 (L) >>=90 mL/min/1.73 m2 Final      BMP:  Lab Results   Component Value Date     05/19/2022    K 3.9 05/19/2022     08/17/2016    CO2 28 05/19/2022    BUN 30.7 (H) 05/19/2022    CREATININE 0.93 05/19/2022    CALCIUM 9.9 05/19/2022    ANIONGAP 6 (L) 08/17/2016    ESTGFRAFRICA >60.0 08/17/2016    EGFRNONAA 84 (L) 11/12/2020      Lipid Panel:  Lab Results   Component Value Date    CHOL 149 10/06/2022    CHOL 150 11/12/2020    CHOL 172 08/25/2020     Lab Results   Component Value Date    HDL 35 10/06/2022    HDL 33 (L) 11/12/2020    HDL 36 (L) 08/25/2020     No results found for: LDLCALC  Lab Results   Component Value Date    TRIG 90 10/06/2022    TRIG 106 11/12/2020    TRIG 117 08/25/2020     No results found for: CHOLHDL   HbA1c:  Lab Results   Component Value Date    HGBA1C 6.1 08/25/2020      TSH:  Lab Results   Component Value Date    TSH 0.7212 05/19/2022       Recent Imaging:  Mammo Digital Screening Bilat w/ Jr   - MAMMO DIGITAL SCREENING BILAT WITH JR    BILATERAL DIGITAL SCREENING MAMMOGRAM 3D/2D WITH CAD: 10/18/2022  HISTORY: 77-year-old woman presents for screening mammogram.      COMPARISONS: No prior exams were available for comparison.      TECHNIQUE: Digital mammography views were performed with tomosynthesis. Current study was evaluated with a Computer Aided Detection (CAD) system.     BREAST COMPOSITION: There are scattered areas of fibroglandular tissue.      FINDINGS:   No suspicious mass, asymmetry, distortion, or calcification is identified.     IMPRESSION: NEGATIVE  Right Breast: Negative (BI-RADS 1)  Left Breast: Negative (BI-RADS 1)    Recommendations:  Recommend continued annual screening mammography (with Digital Breast Tomosynthesis),  according to [employer] guidelines.       Alex Mercedes M.D.            lm/:10/19/2022 07:29:15      letter sent: Mammography Normal    Mammogram BI-RADS: 1 Negative       Assessment & Plan:    Kathleen Arevalo is presenting as above and will be treated as follows:    Kathleen was seen today for knee pain.    Diagnoses and all orders for this visit:    Primary osteoarthritis of both knees  -     meloxicam (MOBIC) 7.5 MG tablet; Take 1 tablet (7.5 mg total) by mouth once daily.  Can consider knee injections at next visit if meloxicam not helpful, continue tylenol 1000mg TID    Lewy body dementia with behavioral disturbance  Use 25-50mg seroquel q3pm for sundowning, stop night seroquel , do not want to use too much of antipsychotic with noting of EPS on exam.   Will try switching to trazodone at night for sleep, give 9-10pm 1 hr before going to bed.  Sleep hygiene - reduce screen time, warm milk, soft lighting, calm music/sounds.   Also take melatonin 10mg ODT tab.  Daughter to call if behaviors continue or worsen.   -     traZODone (DESYREL) 50 MG tablet; Take 0.5 tablets (25 mg total) by mouth every evening for 14 days, THEN 1 tablet (50 mg total) every evening.    Insomnia due to medical condition  -     traZODone (DESYREL) 50 MG tablet; Take 0.5 tablets (25 mg total) by mouth every evening for 14 days, THEN 1 tablet (50 mg total) every evening.         Health Maintenance Reviewed:  Immunization History   Administered Date(s) Administered    COVID-19, MRNA, LN-S, PF (MODERNA FULL 0.5 ML DOSE) 02/09/2021, 03/09/2021, 10/24/2021    Influenza - High Dose - PF (65 years and older) 08/22/2014, 10/27/2016, 10/22/2018    Influenza - Quadrivalent - High Dose - PF (65 years and older) 11/07/2020, 10/24/2021    Influenza - Quadrivalent - PF *Preferred* (6 months and older) 10/08/2009, 10/03/2010    Influenza - Trivalent - PF (ADULT) 10/08/2009, 10/03/2010, 10/12/2015, 10/29/2016    Pneumococcal Conjugate - 13  Valent 10/24/2021    Pneumococcal Polysaccharide - 23 Valent 08/22/2014     Hepatitis Screening:   Lab Results   Component Value Date    HEPBCAB Nonreactive 06/23/2022    HEPCAB Nonreactive 06/23/2022      Sachi Mohamud MD  Attending - Family Medicine / Geriatric Medicine  [facility] [place], [facility]

## 2023-02-15 ENCOUNTER — OFFICE VISIT (OUTPATIENT)
Dept: FAMILY MEDICINE | Facility: CLINIC | Age: 78
End: 2023-02-15
Payer: MEDICARE

## 2023-02-15 DIAGNOSIS — G47.01 INSOMNIA DUE TO MEDICAL CONDITION: ICD-10-CM

## 2023-02-15 DIAGNOSIS — M17.0 PRIMARY OSTEOARTHRITIS OF BOTH KNEES: ICD-10-CM

## 2023-02-15 DIAGNOSIS — I10 HYPERTENSION, UNSPECIFIED TYPE: ICD-10-CM

## 2023-02-15 DIAGNOSIS — G31.83 LEWY BODY DEMENTIA WITH BEHAVIORAL DISTURBANCE: Primary | ICD-10-CM

## 2023-02-15 DIAGNOSIS — F02.818 LEWY BODY DEMENTIA WITH BEHAVIORAL DISTURBANCE: Primary | ICD-10-CM

## 2023-02-15 NOTE — PROGRESS NOTES
[facility]    DOS: 2/15/2023      Subjective:  Chief Complaint:    Chief Complaint   Patient presents with    Hypertension     Medication Management       History of Present Illness:  Kathleen Arevalo is a 77 y.o. female with a PMH of Lewy Body Dementia, HTN, HLD, osteoarthritis, overactive bladder and glaucoma     Who presents today for:  Follow up of Chronic Conditions: HTN, dementia    Video visit to review Bps at home.   Consistently in 120-130s systolic.  Patient without headache, dizziness, chest pain, palpitations, or acute vision changes.     Behaviors have been stable  Knee pain stable     Prior visit:   R> L bilateral knee pain / arthritis - crepitus, mild swelling  Dyskinesia - lipsmacking noted during interview     For knee pain - Start mobic 7.5mg daily for knee pain - take at the end of meal with food  Continue voltaren gel twice a day      For afternoon sundowning  - continue seroquel 25mg daily 2-3pm      For sleep -  STOP night time quetiapine /seroquel (100mg)   START trazodone 25mg  (1/2 tablet) 1 hr before sleep time (9-10pm) for 2 weeks and monitor effect, can increase to full pill if not working well.   Look for - melatonin 10mg ODT - dissolving tab  1 hr before sleep     Consider tapering off rivastigmine/exelon at next visit.      Past Medical History:   Diagnosis Date    Cellulitis     Hypertension       Past Surgical History:   Procedure Laterality Date    HYSTERECTOMY        Family History   Problem Relation Age of Onset    Obesity Mother     Heart disease Father     Obesity Father     Dementia Maternal Aunt     Aneurysm Neg Hx     Cancer Neg Hx     Clotting disorder Neg Hx     Diabetes Neg Hx     Fainting Neg Hx     Hyperlipidemia Neg Hx     Kidney disease Neg Hx     Liver disease Neg Hx     Migraines Neg Hx     Neuropathy Neg Hx     Parkinsonism Neg Hx     Seizures Neg Hx     Stroke Neg Hx     Tremor Neg Hx       Social History      Socioeconomic History    Marital status:      Spouse name: srinivasa eckert    Number of children: 5   Occupational History    Occupation: retired   Tobacco Use    Smoking status: Former     Passive exposure: Never    Smokeless tobacco: Never   Substance and Sexual Activity    Alcohol use: Not Currently     Comment: occ    Drug use: Not Currently    Sexual activity: Not Currently     Partners: Male   Social History Narrative    Receives personal care from children and grand children        Review of patient's allergies indicates:  No Known Allergies     Current Outpatient Medications:     amLODIPine (NORVASC) 10 MG tablet, Take 10 mg by mouth once daily., Disp: , Rfl:     atorvastatin (LIPITOR) 40 MG tablet, Take 1 tablet (40 mg total) by mouth once daily., Disp: 90 tablet, Rfl: 3    diaper,brief,adult,disposable Misc, 1 each by Misc.(Non-Drug; Combo Route) route 2 (two) times a day., Disp: 20 each, Rfl: 1    diclofenac sodium (VOLTAREN) 1 % Gel, Apply 2 g topically every 6 (six) hours as needed., Disp: 50 g, Rfl: 0    latanoprost 0.005 % ophthalmic solution, Place 1 drop into both eyes every evening. Place 1 drop into both eyes every evening., Disp: 2.5 mL, Rfl: 12    losartan (COZAAR) 25 MG tablet, Take 25 mg by mouth once daily., Disp: , Rfl:     meloxicam (MOBIC) 7.5 MG tablet, Take 1 tablet (7.5 mg total) by mouth once daily., Disp: 90 tablet, Rfl: 1    metoprolol succinate (TOPROL-XL) 25 MG 24 hr tablet, Take 1 tablet (25 mg total) by mouth once daily., Disp: 90 tablet, Rfl: 1    QUEtiapine (SEROQUEL) 25 MG Tab, Take 1 tablet (25 mg total) by mouth daily as needed (Non directable agitation)., Disp: 30 tablet, Rfl: 0    rivastigmine tartrate (EXELON) 3 MG capsule, Take 1 capsule (3 mg total) by mouth 2 (two) times daily., Disp: 180 capsule, Rfl: 1    spironolactone (ALDACTONE) 25 MG tablet, Take 1 tablet (25 mg total) by mouth once daily., Disp: 90 tablet, Rfl: 1    traZODone (DESYREL) 50 MG tablet, Take  "0.5 tablets (25 mg total) by mouth every evening for 14 days, THEN 1 tablet (50 mg total) every evening., Disp: 83 tablet, Rfl: 0    underpads 30 X 36 " Pads, 1 each by Misc.(Non-Drug; Combo Route) route every evening., Disp: 30 each, Rfl: 2     Review of Systems:  Review of Systems   Constitutional:  Negative for chills, fever and malaise/fatigue.   HENT:  Negative for congestion, hearing loss, sore throat and tinnitus.    Eyes:  Negative for blurred vision, double vision and photophobia.   Respiratory:  Negative for cough, sputum production and shortness of breath.    Cardiovascular:  Negative for chest pain, palpitations and leg swelling.   Gastrointestinal:  Negative for abdominal pain, constipation, diarrhea, heartburn, nausea and vomiting.   Genitourinary:  Negative for dysuria, frequency and urgency.   Musculoskeletal:  Negative for back pain, falls, joint pain and myalgias.   Neurological:  Negative for dizziness and headaches.   Psychiatric/Behavioral:  Positive for memory loss. Negative for hallucinations.       Objective:   There were no vitals filed for this visit.     Unable to perform physical exam due to virtual visit.     Recent labs:  CBC:  Lab Results   Component Value Date    WBC 7.8 05/19/2022    RBC 4.26 05/19/2022    HGB 12.9 05/19/2022    HCT 39.3 05/19/2022    MCV 92.3 05/19/2022    MCH 30.3 05/19/2022    MCHC 32.8 (L) 05/19/2022    RDW 12.6 05/19/2022     05/19/2022    MPV 9.3 (L) 05/19/2022    GRAN 2.3 08/17/2016    GRAN 45.4 08/17/2016    LYMPH 2.0 08/17/2016    LYMPH 40.1 08/17/2016    MONO 0.5 08/17/2016    MONO 9.3 08/17/2016    EOS 0.2 08/17/2016    BASO 0.00 08/17/2016    EOSINOPHIL 4.8 08/17/2016    BASOPHIL 0.4 08/17/2016      CMP:  Sodium   Date Value Ref Range Status   08/17/2016 140 136 - 145 mmol/L Final     Sodium Level   Date Value Ref Range Status   05/19/2022 139 136 - 145 mmol/L Final     Potassium   Date Value Ref Range Status   08/17/2016 4.2 3.5 - 5.1 mmol/L Final "     Potassium Level   Date Value Ref Range Status   05/19/2022 3.9 3.5 - 5.1 mmol/L Final     Chloride   Date Value Ref Range Status   08/17/2016 107 95 - 110 mmol/L Final     CO2   Date Value Ref Range Status   08/17/2016 27 23 - 29 mmol/L Final     Carbon Dioxide   Date Value Ref Range Status   05/19/2022 28 23 - 31 mmol/L Final     Glucose   Date Value Ref Range Status   08/17/2016 99 70 - 110 mg/dL Final     BUN   Date Value Ref Range Status   08/17/2016 24 (H) 8 - 23 mg/dL Final     Blood Urea Nitrogen   Date Value Ref Range Status   05/19/2022 30.7 (H) 9.8 - 20.1 mg/dL Final     Creatinine   Date Value Ref Range Status   05/19/2022 0.93 0.55 - 1.02 mg/dL Final   08/17/2016 0.9 0.5 - 1.4 mg/dL Final     Calcium   Date Value Ref Range Status   08/17/2016 8.7 8.7 - 10.5 mg/dL Final     Calcium Level Total   Date Value Ref Range Status   05/19/2022 9.9 8.4 - 10.2 mg/dL Final     Total Protein   Date Value Ref Range Status   08/17/2016 7.2 6.0 - 8.4 g/dL Final     Albumin   Date Value Ref Range Status   08/17/2016 3.8 3.5 - 5.2 g/dL Final     Albumin Level   Date Value Ref Range Status   05/19/2022 3.9 3.4 - 4.8 gm/dL Final     Total Bilirubin   Date Value Ref Range Status   08/17/2016 0.6 0.1 - 1.0 mg/dL Final     Comment:     For infants and newborns, interpretation of results should be based  on gestational age, weight and in agreement with clinical  observations.  Premature Infant recommended reference ranges:  Up to 24 hours.............<8.0 mg/dL  Up to 48 hours............<12.0 mg/dL  3-5 days..................<15.0 mg/dL  6-29 days.................<15.0 mg/dL       Bilirubin Total   Date Value Ref Range Status   05/19/2022 0.3 <=1.5 mg/dL Final     Alkaline Phosphatase   Date Value Ref Range Status   05/19/2022 125 40 - 150 unit/L Final   08/17/2016 86 55 - 135 U/L Final     AST   Date Value Ref Range Status   08/17/2016 32 10 - 40 U/L Final     Aspartate Aminotransferase   Date Value Ref Range Status    05/19/2022 23 5 - 34 unit/L Final     ALT   Date Value Ref Range Status   08/17/2016 25 10 - 44 U/L Final     Alanine Aminotransferase   Date Value Ref Range Status   05/19/2022 18 0 - 55 unit/L Final     Anion Gap   Date Value Ref Range Status   08/17/2016 6 (L) 8 - 16 mmol/L Final     eGFR if    Date Value Ref Range Status   08/17/2016 >60.0 >60 mL/min/1.73 m^2 Final     eGFR if non    Date Value Ref Range Status   08/17/2016 >60.0 >60 mL/min/1.73 m^2 Final     Comment:     Calculation used to obtain the estimated glomerular filtration  rate (eGFR) is the CKD-EPI equation. Since race is unknown   in our information system, the eGFR values for   -American and Non--American patients are given   for each creatinine result.       Estimated GFR-Non    Date Value Ref Range Status   11/12/2020 84 (L) >>=90 mL/min/1.73 m2 Final      BMP:  Lab Results   Component Value Date     05/19/2022    K 3.9 05/19/2022     08/17/2016    CO2 28 05/19/2022    BUN 30.7 (H) 05/19/2022    CREATININE 0.93 05/19/2022    CALCIUM 9.9 05/19/2022    ANIONGAP 6 (L) 08/17/2016    ESTGFRAFRICA >60.0 08/17/2016    EGFRNONAA 84 (L) 11/12/2020      Lipid Panel:  Lab Results   Component Value Date    CHOL 149 10/06/2022    CHOL 150 11/12/2020    CHOL 172 08/25/2020     Lab Results   Component Value Date    HDL 35 10/06/2022    HDL 33 (L) 11/12/2020    HDL 36 (L) 08/25/2020     No results found for: LDLCALC  Lab Results   Component Value Date    TRIG 90 10/06/2022    TRIG 106 11/12/2020    TRIG 117 08/25/2020     No results found for: CHOLHDL   HbA1c:  Lab Results   Component Value Date    HGBA1C 6.1 08/25/2020      TSH:  Lab Results   Component Value Date    TSH 0.7212 05/19/2022       Recent Imaging:  Mammo Digital Screening Bilat w/ Jr   - MAMMO DIGITAL SCREENING BILAT WITH JR    BILATERAL DIGITAL SCREENING MAMMOGRAM 3D/2D WITH CAD: 10/18/2022  HISTORY: 77-year-old woman  presents for screening mammogram.      COMPARISONS: No prior exams were available for comparison.      TECHNIQUE: Digital mammography views were performed with tomosynthesis. Current study was evaluated with a Computer Aided Detection (CAD) system.     BREAST COMPOSITION: There are scattered areas of fibroglandular tissue.      FINDINGS:   No suspicious mass, asymmetry, distortion, or calcification is identified.     IMPRESSION: NEGATIVE  Right Breast: Negative (BI-RADS 1)  Left Breast: Negative (BI-RADS 1)    Recommendations:  Recommend continued annual screening mammography (with Digital Breast Tomosynthesis), according to [employer] guidelines.       Alex Mercedes M.D.            lm/:10/19/2022 07:29:15      letter sent: Mammography Normal    Mammogram BI-RADS: 1 Negative       Assessment & Plan:    Kathleen Arevalo is presenting as above and will be treated as follows:    Kathleen was seen today for hypertension.    Diagnoses and all orders for this visit:    Lewy body dementia with behavioral disturbance    Hypertension, unspecified type    Primary osteoarthritis of both knees    Insomnia due to medical condition         Health Maintenance Reviewed:  Immunization History   Administered Date(s) Administered    COVID-19, MRNA, LN-S, PF (MODERNA FULL 0.5 ML DOSE) 02/09/2021, 03/09/2021, 10/24/2021    Influenza - High Dose - PF (65 years and older) 08/22/2014, 10/27/2016, 10/22/2018    Influenza - Quadrivalent - High Dose - PF (65 years and older) 11/07/2020, 10/24/2021    Influenza - Quadrivalent - PF *Preferred* (6 months and older) 10/08/2009, 10/03/2010    Influenza - Trivalent - PF (ADULT) 10/08/2009, 10/03/2010, 10/12/2015, 10/29/2016    Pneumococcal Conjugate - 13 Valent 10/24/2021    Pneumococcal Polysaccharide - 23 Valent 08/22/2014        Hepatitis Screening:   Lab Results   Component Value Date    HEPBCAB Nonreactive 06/23/2022    HEPCAB Nonreactive 06/23/2022    Sachi Mohamud  MD  Attending - Family Medicine / Geriatric Medicine  [facility]

## 2023-03-24 RX ORDER — OXYBUTYNIN CHLORIDE 5 MG/1
5 TABLET ORAL
COMMUNITY
Start: 2023-02-21 | End: 2023-03-24 | Stop reason: SDUPTHER

## 2023-03-27 RX ORDER — OXYBUTYNIN CHLORIDE 5 MG/1
5 TABLET ORAL DAILY
Qty: 30 TABLET | Refills: 2 | Status: SHIPPED | OUTPATIENT
Start: 2023-03-27

## 2023-05-19 DIAGNOSIS — G31.83 LEWY BODY DEMENTIA WITH BEHAVIORAL DISTURBANCE: ICD-10-CM

## 2023-05-19 DIAGNOSIS — F02.818 LEWY BODY DEMENTIA WITH BEHAVIORAL DISTURBANCE: ICD-10-CM

## 2023-05-19 DIAGNOSIS — G47.01 INSOMNIA DUE TO MEDICAL CONDITION: ICD-10-CM

## 2023-05-24 RX ORDER — TRAZODONE HYDROCHLORIDE 50 MG/1
50 TABLET ORAL NIGHTLY
Qty: 90 TABLET | Refills: 1 | Status: ON HOLD | OUTPATIENT
Start: 2023-05-24 | End: 2023-06-06 | Stop reason: HOSPADM

## 2023-05-30 ENCOUNTER — HOSPITAL ENCOUNTER (INPATIENT)
Facility: HOSPITAL | Age: 78
LOS: 7 days | Discharge: NURSING, REHAB OR LONG-TERM CARE | DRG: 689 | End: 2023-06-06
Attending: EMERGENCY MEDICINE | Admitting: INTERNAL MEDICINE
Payer: MEDICARE

## 2023-05-30 DIAGNOSIS — R41.82 ALTERED MENTAL STATUS: ICD-10-CM

## 2023-05-30 LAB
ALBUMIN SERPL-MCNC: 3.6 G/DL (ref 3.4–4.8)
ALBUMIN/GLOB SERPL: 1.3 RATIO (ref 1.1–2)
ALP SERPL-CCNC: 133 UNIT/L (ref 40–150)
ALT SERPL-CCNC: 17 UNIT/L (ref 0–55)
AMPHET UR QL SCN: NEGATIVE
APPEARANCE UR: CLEAR
APTT PPP: 29.8 SECONDS (ref 23.2–33.7)
AST SERPL-CCNC: 23 UNIT/L (ref 5–34)
BACTERIA #/AREA URNS AUTO: ABNORMAL /HPF
BARBITURATE SCN PRESENT UR: NEGATIVE
BASOPHILS # BLD AUTO: 0.05 X10(3)/MCL
BASOPHILS NFR BLD AUTO: 0.8 %
BENZODIAZ UR QL SCN: NEGATIVE
BILIRUB UR QL STRIP.AUTO: NEGATIVE MG/DL
BILIRUBIN DIRECT+TOT PNL SERPL-MCNC: 0.3 MG/DL
BNP BLD-MCNC: 31.6 PG/ML
BUN SERPL-MCNC: 41.3 MG/DL (ref 9.8–20.1)
CALCIUM SERPL-MCNC: 8.8 MG/DL (ref 8.4–10.2)
CANNABINOIDS UR QL SCN: NEGATIVE
CHLORIDE SERPL-SCNC: 107 MMOL/L (ref 98–107)
CO2 SERPL-SCNC: 22 MMOL/L (ref 23–31)
COCAINE UR QL SCN: NEGATIVE
COLOR UR: YELLOW
CREAT SERPL-MCNC: 1.49 MG/DL (ref 0.55–1.02)
EOSINOPHIL # BLD AUTO: 0.08 X10(3)/MCL (ref 0–0.9)
EOSINOPHIL NFR BLD AUTO: 1.3 %
ERYTHROCYTE [DISTWIDTH] IN BLOOD BY AUTOMATED COUNT: 13.3 % (ref 11.5–17)
ETHANOL SERPL-MCNC: <10 MG/DL
FENTANYL UR QL SCN: NEGATIVE
FLUAV AG UPPER RESP QL IA.RAPID: NOT DETECTED
FLUBV AG UPPER RESP QL IA.RAPID: NOT DETECTED
GFR SERPLBLD CREATININE-BSD FMLA CKD-EPI: 36 MLS/MIN/1.73/M2
GLOBULIN SER-MCNC: 2.8 GM/DL (ref 2.4–3.5)
GLUCOSE SERPL-MCNC: 103 MG/DL (ref 82–115)
GLUCOSE UR QL STRIP.AUTO: NEGATIVE MG/DL
HCT VFR BLD AUTO: 34 % (ref 37–47)
HGB BLD-MCNC: 11 G/DL (ref 12–16)
IMM GRANULOCYTES # BLD AUTO: 0.02 X10(3)/MCL (ref 0–0.04)
IMM GRANULOCYTES NFR BLD AUTO: 0.3 %
INR BLD: 1.21 (ref 0–1.3)
KETONES UR QL STRIP.AUTO: NEGATIVE MG/DL
LEUKOCYTE ESTERASE UR QL STRIP.AUTO: ABNORMAL UNIT/L
LYMPHOCYTES # BLD AUTO: 1.32 X10(3)/MCL (ref 0.6–4.6)
LYMPHOCYTES NFR BLD AUTO: 22.2 %
MAGNESIUM SERPL-MCNC: 1.9 MG/DL (ref 1.6–2.6)
MCH RBC QN AUTO: 31.6 PG (ref 27–31)
MCHC RBC AUTO-ENTMCNC: 32.4 G/DL (ref 33–36)
MCV RBC AUTO: 97.7 FL (ref 80–94)
MDMA UR QL SCN: NEGATIVE
MONOCYTES # BLD AUTO: 0.48 X10(3)/MCL (ref 0.1–1.3)
MONOCYTES NFR BLD AUTO: 8.1 %
NEUTROPHILS # BLD AUTO: 4 X10(3)/MCL (ref 2.1–9.2)
NEUTROPHILS NFR BLD AUTO: 67.3 %
NITRITE UR QL STRIP.AUTO: POSITIVE
NRBC BLD AUTO-RTO: 0 %
OPIATES UR QL SCN: NEGATIVE
PCP UR QL: NEGATIVE
PH UR STRIP.AUTO: 6 [PH]
PH UR: 6 [PH] (ref 3–11)
PLATELET # BLD AUTO: 214 X10(3)/MCL (ref 130–400)
PMV BLD AUTO: 9.4 FL (ref 7.4–10.4)
POTASSIUM SERPL-SCNC: 4 MMOL/L (ref 3.5–5.1)
PROT SERPL-MCNC: 6.4 GM/DL (ref 5.8–7.6)
PROT UR QL STRIP.AUTO: NEGATIVE MG/DL
PROTHROMBIN TIME: 15.1 SECONDS (ref 12.5–14.5)
RBC # BLD AUTO: 3.48 X10(6)/MCL (ref 4.2–5.4)
RBC #/AREA URNS AUTO: <5 /HPF
RBC UR QL AUTO: NEGATIVE UNIT/L
SARS-COV-2 RNA RESP QL NAA+PROBE: NOT DETECTED
SODIUM SERPL-SCNC: 140 MMOL/L (ref 136–145)
SP GR UR STRIP.AUTO: 1.02 (ref 1–1.03)
SQUAMOUS #/AREA URNS AUTO: <5 /HPF
TROPONIN I SERPL-MCNC: <0.01 NG/ML (ref 0–0.04)
UROBILINOGEN UR STRIP-ACNC: 1 MG/DL
WBC # SPEC AUTO: 5.95 X10(3)/MCL (ref 4.5–11.5)
WBC #/AREA URNS AUTO: 26 /HPF

## 2023-05-30 PROCEDURE — 81001 URINALYSIS AUTO W/SCOPE: CPT | Performed by: EMERGENCY MEDICINE

## 2023-05-30 PROCEDURE — 85730 THROMBOPLASTIN TIME PARTIAL: CPT | Performed by: EMERGENCY MEDICINE

## 2023-05-30 PROCEDURE — 85610 PROTHROMBIN TIME: CPT | Performed by: EMERGENCY MEDICINE

## 2023-05-30 PROCEDURE — 11000001 HC ACUTE MED/SURG PRIVATE ROOM

## 2023-05-30 PROCEDURE — 85025 COMPLETE CBC W/AUTO DIFF WBC: CPT | Performed by: EMERGENCY MEDICINE

## 2023-05-30 PROCEDURE — 83880 ASSAY OF NATRIURETIC PEPTIDE: CPT | Performed by: EMERGENCY MEDICINE

## 2023-05-30 PROCEDURE — 87077 CULTURE AEROBIC IDENTIFY: CPT | Performed by: EMERGENCY MEDICINE

## 2023-05-30 PROCEDURE — 80307 DRUG TEST PRSMV CHEM ANLYZR: CPT | Performed by: EMERGENCY MEDICINE

## 2023-05-30 PROCEDURE — 21400001 HC TELEMETRY ROOM

## 2023-05-30 PROCEDURE — 25000003 PHARM REV CODE 250: Performed by: PHYSICIAN ASSISTANT

## 2023-05-30 PROCEDURE — 87088 URINE BACTERIA CULTURE: CPT | Performed by: EMERGENCY MEDICINE

## 2023-05-30 PROCEDURE — 63600175 PHARM REV CODE 636 W HCPCS: Performed by: INTERNAL MEDICINE

## 2023-05-30 PROCEDURE — 80053 COMPREHEN METABOLIC PANEL: CPT | Performed by: EMERGENCY MEDICINE

## 2023-05-30 PROCEDURE — 63600175 PHARM REV CODE 636 W HCPCS: Performed by: EMERGENCY MEDICINE

## 2023-05-30 PROCEDURE — 25000003 PHARM REV CODE 250: Performed by: EMERGENCY MEDICINE

## 2023-05-30 PROCEDURE — 0240U COVID/FLU A&B PCR: CPT | Performed by: EMERGENCY MEDICINE

## 2023-05-30 PROCEDURE — 83735 ASSAY OF MAGNESIUM: CPT | Performed by: EMERGENCY MEDICINE

## 2023-05-30 PROCEDURE — 93010 ELECTROCARDIOGRAM REPORT: CPT | Mod: ,,, | Performed by: INTERNAL MEDICINE

## 2023-05-30 PROCEDURE — 25000003 PHARM REV CODE 250: Performed by: NURSE PRACTITIONER

## 2023-05-30 PROCEDURE — 93010 EKG 12-LEAD: ICD-10-PCS | Mod: ,,, | Performed by: INTERNAL MEDICINE

## 2023-05-30 PROCEDURE — 82077 ASSAY SPEC XCP UR&BREATH IA: CPT | Performed by: EMERGENCY MEDICINE

## 2023-05-30 PROCEDURE — 93005 ELECTROCARDIOGRAM TRACING: CPT

## 2023-05-30 PROCEDURE — 84484 ASSAY OF TROPONIN QUANT: CPT | Performed by: EMERGENCY MEDICINE

## 2023-05-30 PROCEDURE — 99285 EMERGENCY DEPT VISIT HI MDM: CPT | Mod: 25

## 2023-05-30 RX ORDER — IBUPROFEN 200 MG
24 TABLET ORAL
Status: DISCONTINUED | OUTPATIENT
Start: 2023-05-30 | End: 2023-06-06 | Stop reason: HOSPADM

## 2023-05-30 RX ORDER — LABETALOL HYDROCHLORIDE 5 MG/ML
20 INJECTION, SOLUTION INTRAVENOUS EVERY 4 HOURS PRN
Status: DISCONTINUED | OUTPATIENT
Start: 2023-05-30 | End: 2023-06-06 | Stop reason: HOSPADM

## 2023-05-30 RX ORDER — ACETAMINOPHEN 325 MG/1
650 TABLET ORAL EVERY 8 HOURS PRN
Status: DISCONTINUED | OUTPATIENT
Start: 2023-05-30 | End: 2023-06-06 | Stop reason: HOSPADM

## 2023-05-30 RX ORDER — HYDRALAZINE HYDROCHLORIDE 20 MG/ML
5 INJECTION INTRAMUSCULAR; INTRAVENOUS EVERY 4 HOURS PRN
Status: DISCONTINUED | OUTPATIENT
Start: 2023-05-30 | End: 2023-05-31

## 2023-05-30 RX ORDER — GLUCAGON 1 MG
1 KIT INJECTION
Status: DISCONTINUED | OUTPATIENT
Start: 2023-05-30 | End: 2023-06-06 | Stop reason: HOSPADM

## 2023-05-30 RX ORDER — DICLOFENAC SODIUM 75 MG/1
75 TABLET, DELAYED RELEASE ORAL 2 TIMES DAILY
Status: ON HOLD | COMMUNITY
End: 2023-06-06 | Stop reason: HOSPADM

## 2023-05-30 RX ORDER — ACETAMINOPHEN 325 MG/1
650 TABLET ORAL EVERY 4 HOURS PRN
Status: DISCONTINUED | OUTPATIENT
Start: 2023-05-30 | End: 2023-06-06 | Stop reason: HOSPADM

## 2023-05-30 RX ORDER — ONDANSETRON 2 MG/ML
4 INJECTION INTRAMUSCULAR; INTRAVENOUS EVERY 8 HOURS PRN
Status: DISCONTINUED | OUTPATIENT
Start: 2023-05-30 | End: 2023-06-06 | Stop reason: HOSPADM

## 2023-05-30 RX ORDER — IBUPROFEN 200 MG
16 TABLET ORAL
Status: DISCONTINUED | OUTPATIENT
Start: 2023-05-30 | End: 2023-06-06 | Stop reason: HOSPADM

## 2023-05-30 RX ORDER — SODIUM CHLORIDE 9 MG/ML
INJECTION, SOLUTION INTRAVENOUS CONTINUOUS
Status: DISCONTINUED | OUTPATIENT
Start: 2023-05-30 | End: 2023-05-31

## 2023-05-30 RX ADMIN — HYDRALAZINE HYDROCHLORIDE 5 MG: 20 INJECTION INTRAMUSCULAR; INTRAVENOUS at 07:05

## 2023-05-30 RX ADMIN — LABETALOL HYDROCHLORIDE 20 MG: 5 INJECTION, SOLUTION INTRAVENOUS at 08:05

## 2023-05-30 RX ADMIN — SODIUM CHLORIDE: 9 INJECTION, SOLUTION INTRAVENOUS at 09:05

## 2023-05-30 RX ADMIN — CEFTRIAXONE SODIUM 1 G: 1 INJECTION, POWDER, FOR SOLUTION INTRAMUSCULAR; INTRAVENOUS at 04:05

## 2023-05-30 NOTE — NURSING
Left message with starred and listed phone number in chart regarding getting in touch with family. Patient remains very confused and states she's leaving because she needs to go  her kids. Notified dr mina and new orders for psych consult and a 1:1 sitter.

## 2023-05-30 NOTE — ED NOTES
"Per EMS, "PD on scene said patient was wandering around her neighborhood, with hx of dementia, when attempted to bring patient back to home, house door's were locked from the inside". Attempted to call Elderly Protective Services, case management consulted  "

## 2023-05-30 NOTE — NURSING
Nurses Note -- 4 Eyes      5/30/2023   3:30 PM      Skin assessed during: Admit      [x] No Altered Skin Integrity Present    []Prevention Measures Documented      [] Yes- Altered Skin Integrity Present or Discovered   [] LDA Added if Not in Epic (Describe Wound)   [] New Altered Skin Integrity was Present on Admit and Documented in LDA   [] Wound Image Taken    Wound Care Consulted? No    Attending Nurse:  Yumiko Salvador LPN     Second RN/Staff Member:  Aleksandr Valera RN

## 2023-05-30 NOTE — ED PROVIDER NOTES
Encounter Date: 5/30/2023    SCRIBE #1 NOTE: I, Myra Horvath, am scribing for, and in the presence of,  Lopez Louis MD. I have scribed the entire note.     History     Chief Complaint   Patient presents with    Fall     Pt was found walking around the neighborhood- PD intervened and unable to get pt into house, called [facility] to eval dt bruised/ swollen r eye.  hx of dementia. Arrives with swollen right eye. No blood thinners noted in EMR and pt denies. Pt GCS 14 which is per [facility] baseline. Pt states she fell Sunday and able to identify that today is Tuesday.        77 year old female with an unknown medical hx presents to the ED via EMS for a fall. Pt was found wandering around her neighborhood by local police. Pt's homes was completely boarded up leaving her unable to get into the house. Pt has a swollen right eye, so the police called EMS to bring the pt to the ED. Pt is not oriented to time, place, or situation. Pt vaguely states that she tripped and fell. Pt first said the fall happened a month ago, then said the fall happened last week. Pt's ROS is limited due to dementia.    The history is provided by the EMS personnel. The history is limited by the condition of the patient. No  was used.   Fall  Illness onset: unknown. The fall occurred while walking. Distance fallen: ground level. She landed on A hard floor. Point of impact: unknown. Pertinent negatives include no back pain, no fever, no abdominal pain, no nausea, no vomiting and no headaches. She has tried nothing for the symptoms. The treatment provided no relief.   Review of patient's allergies indicates:  No Known Allergies  Past Medical History:   Diagnosis Date    Cellulitis     Hypertension      Past Surgical History:   Procedure Laterality Date    HYSTERECTOMY       Family History   Problem Relation Age of Onset    Obesity Mother     Heart disease Father     Obesity Father     Dementia Maternal Aunt     Aneurysm Neg Hx      Cancer Neg Hx     Clotting disorder Neg Hx     Diabetes Neg Hx     Fainting Neg Hx     Hyperlipidemia Neg Hx     Kidney disease Neg Hx     Liver disease Neg Hx     Migraines Neg Hx     Neuropathy Neg Hx     Parkinsonism Neg Hx     Seizures Neg Hx     Stroke Neg Hx     Tremor Neg Hx      Social History     Tobacco Use    Smoking status: Former     Passive exposure: Never    Smokeless tobacco: Never   Substance Use Topics    Alcohol use: Not Currently     Comment: occ    Drug use: Not Currently     Review of Systems   Constitutional:  Negative for chills and fever.   HENT:  Negative for congestion and ear pain.    Eyes:  Negative for discharge.   Respiratory:  Negative for cough, shortness of breath and wheezing.    Cardiovascular:  Negative for chest pain and leg swelling.   Gastrointestinal:  Negative for abdominal pain, constipation, diarrhea, nausea and vomiting.   Genitourinary:  Negative for dysuria, flank pain and frequency.   Musculoskeletal:  Negative for back pain and joint swelling.   Skin:  Negative for rash.   Neurological:  Negative for dizziness, weakness and headaches.   Psychiatric/Behavioral:  Negative for agitation, confusion and hallucinations.      Physical Exam     Initial Vitals [05/30/23 0158]   BP Pulse Resp Temp SpO2   (!) 131/58 66 16 97.7 °F (36.5 °C) 98 %      MAP       --         Physical Exam    Nursing note and vitals reviewed.  Constitutional: She appears well-developed. No distress.   HENT:   Head: Normocephalic and atraumatic.   Mouth/Throat: Oropharynx is clear and moist.   Eyes: Conjunctivae and EOM are normal. Pupils are equal, round, and reactive to light.   Periorbital edema to right eye   Neck: Neck supple.   Cardiovascular:  Normal rate and regular rhythm.           No murmur heard.  Pulmonary/Chest: Breath sounds normal. No respiratory distress. She exhibits no tenderness.   Abdominal: Abdomen is soft. Bowel sounds are normal. She exhibits no distension. There is no abdominal  tenderness.   Musculoskeletal:         General: Normal range of motion.      Cervical back: Neck supple.      Lumbar back: Normal. Normal range of motion.     Neurological: She is alert. She has normal strength. No cranial nerve deficit or sensory deficit. GCS eye subscore is 4. GCS verbal subscore is 4. GCS motor subscore is 6.   Follows commands and moving all extremities. Not oriented to place, time, or situation.    Psychiatric: Judgment normal.       ED Course   Procedures  Labs Reviewed   COMPREHENSIVE METABOLIC PANEL - Abnormal; Notable for the following components:       Result Value    Carbon Dioxide 22 (*)     Blood Urea Nitrogen 41.3 (*)     Creatinine 1.49 (*)     All other components within normal limits   PROTIME-INR - Abnormal; Notable for the following components:    PT 15.1 (*)     All other components within normal limits   URINALYSIS, REFLEX TO URINE CULTURE - Abnormal; Notable for the following components:    Nitrites, UA Positive (*)     Leukocyte Esterase, UA 2+ (*)     All other components within normal limits   CBC WITH DIFFERENTIAL - Abnormal; Notable for the following components:    RBC 3.48 (*)     Hgb 11.0 (*)     Hct 34.0 (*)     MCV 97.7 (*)     MCH 31.6 (*)     MCHC 32.4 (*)     All other components within normal limits   URINALYSIS, MICROSCOPIC - Abnormal; Notable for the following components:    WBC, UA 26 (*)     Bacteria, UA 4+ (*)     All other components within normal limits   B-TYPE NATRIURETIC PEPTIDE - Normal   APTT - Normal   COVID/FLU A&B PCR - Normal    Narrative:     The Xpert Xpress SARS-CoV-2/FLU/RSV plus is a rapid, multiplexed real-time PCR test intended for the simultaneous qualitative detection and differentiation of SARS-CoV-2, Influenza A, Influenza B, and respiratory syncytial virus (RSV) viral RNA in either nasopharyngeal swab or nasal swab specimens.         DRUG SCREEN, URINE (BEAKER) - Normal    Narrative:     Cut off concentrations:    Amphetamines - 1000  ng/ml  Barbiturates - 200 ng/ml  Benzodiazepine - 200 ng/ml  Cannabinoids (THC) - 50 ng/ml  Cocaine - 300 ng/ml  Fentanyl - 1.0 ng/ml  MDMA - 500 ng/ml  Opiates - 300 ng/ml   Phencyclidine (PCP) - 25 ng/ml    Specimen submitted for drug analysis and tested for pH and specific gravity in order to evaluate sample integrity. Suspect tampering if specific gravity is <1.003 and/or pH is not within the range of 4.5 - 8.0  False negatives may result form substances such as bleach added to urine.  False positives may result for the presence of a substance with similar chemical structure to the drug or its metabolite.    This test provides only a PRELIMINARY analytical test result. A more specific alternate chemical method must be used in order to obtain a confirmed analytical result. Gas chromatography/mass spectrometry (GC/MS) is the preferred confirmatory method. Other chemical confirmation methods are available. Clinical consideration and professional judgement should be applied to any drug of abuse test result, particularly when preliminary positive results are used.    Positive results will be confirmed only at the physicians request. Unconfirmed screening results are to be used only for medical purposes (treatment).        ALCOHOL,MEDICAL (ETHANOL) - Normal   MAGNESIUM - Normal   TROPONIN I - Normal   CBC W/ AUTO DIFFERENTIAL    Narrative:     The following orders were created for panel order CBC auto differential.  Procedure                               Abnormality         Status                     ---------                               -----------         ------                     CBC with Differential[[id number]]        Abnormal            Final result                 Please view results for these tests on the individual orders.        ECG Results              EKG 12-lead (Final result)  Result time 05/31/23 08:12:13      Final result by Interface, Lab In [facility] (05/31/23 08:12:13)                   Narrative:     Test Reason : R41.82,    Vent. Rate : 065 BPM     Atrial Rate : 065 BPM     P-R Int : 188 ms          QRS Dur : 088 ms      QT Int : 408 ms       P-R-T Axes : -12 017 001 degrees     QTc Int : 424 ms    Normal sinus rhythm  Normal ECG  Confirmed by Asaf Penaloza MD ([id number]) on 5/31/2023 8:12:03 AM    Referred By: [email or web address]   SELF           Confirmed By:Asaf Penaloza MD                                     EKG 12-LEAD (Final result)  Result time 06/09/23 09:13:11      Final result by Unknown User (06/09/23 09:13:11)                                      Imaging Results              XR Metastatic Survey (Final result)  Result time 05/30/23 13:42:52      Final result by Amilcar Stern MD (05/30/23 13:42:52)                   Narrative:    EXAMINATION  XR METASTATIC SURVEY    CLINICAL HISTORY  1 Lytic lesion on CT, poor historiam, no family available.;    TECHNIQUE  A total of 22 images of the axial and appendicular skeletal structures were obtained for purposes of metastatic survey.    COMPARISON  None available at the time of initial interpretation.    FINDINGS  The visualized osseous structures are without convincing expansile, lytic, or sclerotic lesion. No findings to suggest focal bone disease are appreciated. The trabecular pattern is unremarkable.    There is no acute vertebral column malalignment, cortical displacement, or articular subluxation.  Regional degenerative changes are noted.  No skeletal asymmetry is appreciated.    The included soft tissues are without convincing acute abnormality.  Scattered vascular calcification is present.  No focal abnormality of the visualized intrathoracic and abdominopelvic regions is appreciated.    IMPRESSION  1. No convincing destructive skeletal lesion by radiographic assessment.  2. Chronic secondary details discussed above.      Electronically signed by: Amilcar Stern  Date:    05/30/2023  Time:    13:42                                     CT Cervical Spine Without  Contrast (Final result)  Result time 05/30/23 07:40:26      Final result by Cuco Posey MD (05/30/23 07:40:26)                   Impression:    Impression:    1. No acute cervical spine fracture dislocation or subluxation is seen.    2. Degenerative changes and other details as above.    No significant discrepancy with overnight report.      Electronically signed by: Cuco Posey  Date:    05/30/2023  Time:    07:40               Narrative:      Technique:CT of the cervical spine was performed without intravenous contrast with axial as well as sagittal and coronal images.    Comparison:None.    Dosage Information:Automated exposure control was utilized.      Clinical history:Fall, facial injury.    Findings:    Lung apices:The visualized lung apices appear unremarkable.    Mineralization:Mild osteopenia is seen in the visualized bony structures of the cervical spine.    Rotation:Leftward rotation of C1 with respect to C2 is seen.    Scoliosis:No significant scoliosis is seen.    Vertebral Fusion:No vertebral fusion is identified.    Listhesis:No significant listhesis is identified.    Lordosis:Degenerative straightening of the cervical lordosis is seen.    Intervertebral disc spaces:Multilevel loss of disc height is seen.    Osteophytes:Multilevel endplate osteophytes are seen.    Endplate Sclerosis:Multilevel endplate sclerosis is seen.    Uncovertebral degenerative changes:Multilevel uncovertebral joint arthrosis is seen.    Facet degenerative changes:Multilevel facet degenerative changes are seen.    Fractures:No acute cervical spine fracture dislocation or subluxation is seen.    This exam does not exclude the possibility of intrathecal soft tissue, ligamentous or vascular injury.                        Preliminary result by Cuco Posey MD (05/30/23 03:36:02)                   Narrative:    START OF REPORT:  Technique: CT of the cervical spine was performed without intravenous contrast with axial  as well as sagittal and coronal images.    Comparison: None.    Dosage Information: Automated exposure control was utilized.    Clinical history: Fall, facial injury.    Findings:  Lung apices: The visualized lung apices appear unremarkable.  Spine:  Spinal canal: The spinal canal appears unremarkable.  Mineralization: Mild osteopenia is seen in the visualized bony structures of the cervical spine.  Rotation: Leftward rotation of C1 with respect to C2 is seen.  Scoliosis: No significant scoliosis is seen.  Vertebral Fusion: No vertebral fusion is identified.  Listhesis: No significant listhesis is identified.  Lordosis: Degenerative straightening of the cervical lordosis is seen.  Intervertebral disc spaces: Multilevel loss of disc height is seen.  Osteophytes: Multilevel endplate osteophytes are seen.  Endplate Sclerosis: Multilevel endplate sclerosis is seen.  Uncovertebral degenerative changes: Multilevel uncovertebral joint arthrosis is seen.  Facet degenerative changes: Multilevel facet degenerative changes are seen.  Fractures: No acute cervical spine fracture dislocation or subluxation is seen.  Orthopedic Hardware: None.    Miscellaneous:  Mastoid air cells: The visualized mastoid air cells appear clear.  Soft Tissues: Unremarkable.      Impression:  1. No acute cervical spine fracture dislocation or subluxation is seen.  2. Degenerative changes and other details as above.                                         CT Head Without Contrast (Final result)  Result time 05/30/23 07:35:05      Final result by Cuco Posey MD (05/30/23 07:35:05)                   Impression:    Impression:    1. Minimal scalp soft tissue swelling is seen along the left parietal bone. This consistent with a scalp contusion. No underlying bone injury is identified.    2. There is a focal lytic lesion in the left sphenoid wing on series 4 image 44. Correlate clinically as regards additional evaluation and follow-up.    3. No acute  intracranial traumatic injury identified. Details and other findings as noted above.    No significant discrepancy with overnight report.      Electronically signed by: Cuco Posey  Date:    05/30/2023  Time:    07:35               Narrative:      Technique:CT of the head was performed without intravenous contrast with axial as well as coronal and sagittal images.    Comparison:None.    Dosage Information:Automated exposure control was utilized.  DLP 1337    Clinical history:Fall, facial injury.    Findings:    Hemorrhage:No acute intracranial hemorrhage is seen.    CSF spaces:The ventricles, sulci and basal cisterns all appear mildly prominent consistent with global cerebral atrophy.    Brain parenchyma:There is preservation of the grey white junction throughout. Mild to moderate microvascular change is seen in portions of the periventricular and deep white matter tracts.    Cerebellum:Unremarkable..    Calvarium:There is a focal lytic lesion in the left sphenoid wing on series 4 image 44. No acute linear or depressed skull fracture is seen.    Scalp:Minimal scalp soft tissue swelling is seen along the left parietal bone. The swelling is centered on image 20, Series 3. This consistent with a scalp contusion. No underlying bone injury is identified.    Maxillofacial Structures:Maxillofacial findings discussed separately in the maxillofacial CT report.                        Preliminary result by Cuco Posey MD (05/30/23 03:29:56)                   Narrative:    START OF REPORT:  Technique: CT of the head was performed without intravenous contrast with axial as well as coronal and sagittal images.    Comparison: None.    Dosage Information: Automated exposure control was utilized.    Clinical history: Fall, facial injury.    Findings:  Hemorrhage: No acute intracranial hemorrhage is seen.  CSF spaces: The ventricles, sulci and basal cisterns all appear mildly prominent consistent with global cerebral  atrophy.  Brain parenchyma: There is preservation of the grey white junction throughout. Mild to moderate microvascular change is seen in portions of the periventricular and deep white matter tracts.  Cerebellum: Unremarkable.  Sella and skull base: The sella appears to be within normal limits for age.  Intracranial calcifications: Incidental note is made of bilateral choroid plexus calcification. Incidental note is made of some pineal region calcification.  Calvarium: There is a focal lytic lesion in the left sphenoid wing on series 4 image 44. No acute linear or depressed skull fracture is seen.  Scalp: Minimal scalp soft tissue swelling is seen along the left parietal bone. The swelling is centered on âImage 20, Series 3â. This consistent with a scalp contusion. No underlying bone injury is identified.    Maxillofacial Structures: Maxillofacial findings discussed separately in the maxillofacial CT report.      Impression:  1. Minimal scalp soft tissue swelling is seen along the left parietal bone. This consistent with a scalp contusion. No underlying bone injury is identified.  2. There is a focal lytic lesion in the left sphenoid wing on series 4 image 44. Correlate clinically as regards additional evaluation and follow-up.  3. No acute intracranial traumatic injury identified. Details and other findings as noted above.                                         CT Maxillofacial Without Contrast (Final result)  Result time 05/30/23 07:37:44      Final result by Cuco Posey MD (05/30/23 07:37:44)                   Impression:    Impression:    1. There is moderate soft tissue swelling in the right periorbital and preseptal spaces. There is internal hyperdensity within the swelling which is consistent with hematoma. This is consistent with soft tissue contusion with associated hematoma. No underlying bony injury is seen.    2. No acute maxillofacial fracture identified. Details and other findings as noted  above.    No significant discrepancy with overnight report.      Electronically signed by: Cuco Posey  Date:    05/30/2023  Time:    07:37               Narrative:      Technique:Noncontrast maxillofacial CT was performed with axial as well as sagittal and coronal images being submitted for interpretation.    Automated exposure control was utilized to minimize radiation dose     Comparison:None.    Clinical history:Fall, facial injury.    Findings:    Orbital soft tissues:There is moderate soft tissue swelling in the right periorbital and preseptal spaces. There is internal hyperdensity within the swelling which is consistent with hematoma. This is consistent with soft tissue contusion with associated hematoma. No underlying bony injury is seen.    preseptal soft tissues:The left preseptal soft tissues appear unremarkable.    post- septal soft tissues:The bilateral extraconal soft tissues appear unremarkable. The bilateral intraconal soft tissues appear unremarkable.    Bones:    Orbital bony structures:The bilateral orbital bony structures are intact with no orbital fracture identified.    Mandible:The mandible appears unremarkable.    Maxilla:The maxilla appears unremarkable.    Pterygoid plates:No fracture identified of the right or left pterygoid plates.    Zygoma:The zygomatic arches are intact.    TMJ:The mandibular condyles appear normally placed with respect to the mandibular fossa.    Nasal Bones:The nasal septum is midline.    Skull:No acute linear or depressed fracture is identified in the visualized skull.    Paranasal sinuses:The visualized paranasal sinuses appear clear with no mucoperiosteal thickening or air fluid levels identified.    Mastoid air cells:The visualized mastoid air cells appear clear.    Brain:Intracranial findings discussed separately.                        Preliminary result by Cuco Posey MD (05/30/23 03:27:42)                   Narrative:    START OF  REPORT:  Technique: Noncontrast maxillofacial CT was performed with axial as well as sagittal and coronal images being submitted for interpretation.    Comparison: None.    Clinical history: Fall, facial injury.    Findings:  Orbital soft tissues: There is moderate soft tissue swelling in the right periorbital and preseptal spaces. There is internal hyperdensity within the swelling which is consistent with hematoma. This is consistent with soft tissue contusion with associated hematoma. No underlying bony injury is seen.  preseptal soft tissues: The left preseptal soft tissues appear unremarkable.  post- septal soft tissues: The bilateral extraconal soft tissues appear unremarkable. The bilateral intraconal soft tissues appear unremarkable.  Bones:  Orbital bony structures: The bilateral orbital bony structures are intact with no orbital fracture identified.  Mandible: The mandible appears unremarkable.  Maxilla: The maxilla appears unremarkable.  Pterygoid plates: No fracture identified of the right or left pterygoid plates.  Zygoma: The zygomatic arches are intact.  TMJ: The mandibular condyles appear normally placed with respect to the mandibular fossa.  Nasal Bones: The nasal septum is midline.  Skull: No acute linear or depressed fracture is identified in the visualized skull.  Paranasal sinuses: The visualized paranasal sinuses appear clear with no mucoperiosteal thickening or air fluid levels identified.  Mastoid air cells: The visualized mastoid air cells appear clear.  Brain: Intracranial findings discussed separately.      Impression:  1. There is moderate soft tissue swelling in the right periorbital and preseptal spaces. There is internal hyperdensity within the swelling which is consistent with hematoma. This is consistent with soft tissue contusion with associated hematoma. No underlying bony injury is seen.  2. No acute maxillofacial fracture identified. Details and other findings as noted above.                                          Medications   cefTRIAXone (ROCEPHIN) 1 g in dextrose 5 % in water (D5W) 5 % 50 mL IVPB (MB+) (0 g Intravenous Stopped 5/30/23 0552)   haloperidol lactate injection 5 mg (5 mg Intramuscular Given 5/31/23 0200)   cefTRIAXone injection 1,000 mg (1,000 mg Intramuscular Given 6/2/23 0723)   cefdinir capsule 300 mg (300 mg Oral Given 6/4/23 2012)              Scribe Attestation:   Scribe #1: I performed the above scribed service and the documentation accurately describes the services I performed. I attest to the accuracy of the note.    Attending Attestation:           Physician Attestation for Scribe:  Physician Attestation Statement for Scribe #1: I, Lopez Louis MD, reviewed documentation, as scribed by Myra Horvath in my presence, and it is both accurate and complete.       Medical Decision Making  The differential diagnosis includes, but is not limited to, facial fracture, intracranial injury, spinal injury, dementia, delirium, or UTI.      Amount and/or Complexity of Data Reviewed  Radiology: ordered and independent interpretation performed.                         Clinical Impression:   Final diagnoses:  [R41.82] Altered mental status        ED Disposition Condition    Admit                 Lopez Louis MD  06/14/23 3549

## 2023-05-30 NOTE — H&P
[facility] Medicine History & Physical Examination       Patient Name: Kathleen Arevalo  MRN: [id number]  Patient Class: IP- Inpatient   Admission Date: 5/30/2023   Admitting Physician: Rashaun Seaman MD  Length of Stay: 0  Attending Physician: Ramses Mcdaniel MD   Primary Care Provider: Primary Doctor No  Face-to-Face encounter date: 05/30/2023  Code Status:Full code   Chief Complaint: Fall (Pt was found walking around the neighborhood- PD intervened and unable to get pt into house, called aasi to eval dt bruised/ swollen r eye.  hx of dementia. Arrives with swollen right eye. No blood thinners noted in EMR and pt denies. Pt GCS 14 which is per [facility] baseline. Pt states she fell Sunday and able to identify that today is Tuesday. /)        Patient information was obtained from patient, patient's family, past medical records and ER records.     HISTORY OF PRESENT ILLNESS:   Kathleen Arevalo is a 77 y.o. female with a past medical history of essential hypertension, hyperlipidemia, and dementia presented to [facility] on 5/30/2023 via EMS for altered mental status.  Patient was found by police wandering around her neighborhood.  Patient's home was locked and she was not able to get in. Police also noted bruising and swelling to her right eye.  Patient reported to police she fell on Sunday. On exam patient reports she fell last night.  History is limited secondary to patient's history of dementia.  Majority of the history was obtained review of the medical record.  Initial vital signs in ED were /58, pulse 66, respirations 16, temperature 36.5° C, and SpO2 98% on room air.  Labs revealed WBC 5.95, CO2 22, BUN 41.3, creatinine 1.49, and troponin undetectable.  UDS was negative.  UA was positive for nitrites, 2+ leukocytes, 26 white blood cells, and 4+ bacteria. EKG revealed normal sinus rhythm with ventricular rate of 65 bpm.  CT head revealed minimal scalp soft tissue swelling seen along  the left parietal bone, focal lytic lesion in the left sphenoid wing, and no acute intra cranial traumatic injury identified.  CT cervical spine revealed no acute cervical spine fracture, dislocation, or subluxation.  CT maxillofacial without contrast revealed moderate soft tissue swelling in the right periorbital and preseptal spaces with internal hyperdensity within the swelling which is consistent with hematoma without underlying bony injury. Patient was admitted to hospital medicine service for further medical management.     PAST MEDICAL HISTORY:   Essential hypertension  Hyperlipidemia   Dementia    PAST SURGICAL HISTORY:     Past Surgical History:   Procedure Laterality Date    HYSTERECTOMY         ALLERGIES:   Patient has no known allergies.    FAMILY HISTORY:   Unable to obtain secondary to patient's medical condition     SOCIAL HISTORY:   Unable to obtain secondary to patient's medical condition     HOME MEDICATIONS:     Prior to Admission medications    Medication Sig Start Date End Date Taking? Authorizing Provider   amLODIPine (NORVASC) 10 MG tablet Take 10 mg by mouth once daily. 7/22/21   Historical Provider   atorvastatin (LIPITOR) 40 MG tablet Take 1 tablet (40 mg total) by mouth once daily. 10/6/22 10/6/23  Liz Coronado MD   diaper,brief,adult,disposable Misc 1 each by Misc.(Non-Drug; Combo Route) route 2 (two) times a day. 7/28/22   Alina Adam MD   diclofenac sodium (VOLTAREN) 1 % Gel Apply 2 g topically every 6 (six) hours as needed. 7/28/22   Alina Adam MD   latanoprost 0.005 % ophthalmic solution Place 1 drop into both eyes every evening. Place 1 drop into both eyes every evening. 5/4/22   Sergio Samuels MD   losartan (COZAAR) 25 MG tablet Take 25 mg by mouth once daily. 2/10/22   Historical Provider   meloxicam (MOBIC) 7.5 MG tablet Take 1 tablet (7.5 mg total) by mouth once daily. 1/4/23   Sachi Mohamud MD   metoprolol succinate (TOPROL-XL) 25 MG 24 hr tablet Take 1 tablet  "(25 mg total) by mouth once daily. 10/6/22 4/4/23  Liz Coronado MD   oxybutynin (DITROPAN) 5 MG Tab Take 1 tablet (5 mg total) by mouth once daily. 3/27/23   Liz Coronado MD   QUEtiapine (SEROQUEL) 25 MG Tab Take 1 tablet (25 mg total) by mouth daily as needed (Non directable agitation). 10/6/22 4/4/23  Liz Coronado MD   rivastigmine tartrate (EXELON) 3 MG capsule Take 1 capsule (3 mg total) by mouth 2 (two) times daily. 10/6/22 4/4/23  Liz Coronado MD   spironolactone (ALDACTONE) 25 MG tablet Take 1 tablet (25 mg total) by mouth once daily. 11/30/22 2/28/23  Sachi Mohamud MD   traZODone (DESYREL) 50 MG tablet Take 1 tablet (50 mg total) by mouth every evening. 5/24/23   Sachi Mohamud MD   underpads 30 X 36 " Pads 1 each by Misc.(Non-Drug; Combo Route) route every evening. 7/28/22   Alina Adam MD       REVIEW OF SYSTEMS:   Unable to obtain secondary to patient's medical condition     PHYSICAL EXAM:     VITAL SIGNS: 24 HRS MIN & MAX LAST   Temp  Min: 97.7 °F (36.5 °C)  Max: 97.7 °F (36.5 °C) 97.7 °F (36.5 °C)   BP  Min: 128/77  Max: 185/75 (!) 183/78   Pulse  Min: 60  Max: 74  64   Resp  Min: 16  Max: 18 18   SpO2  Min: 96 %  Max: 98 % 96 %       General appearance: Female in no apparent distress.  HEENNT: Atraumatic head. Right periorbital eye swelling and bruising   Lungs: Clear to auscultation bilaterally.   Heart: Regular rate and rhythm.    Abdomen: Soft, non-distended, non-tender. Bowel sounds are normal.   Extremities: No cyanosis, clubbing, or edema. No deformities.   Skin: No Rash. Warm and dry.   Neuro: Awake and alert. Confused.   Psych/mental status: Rambled speech     LABS AND IMAGING:     Recent Labs   Lab 05/30/23 0357   WBC 5.95   RBC 3.48*   HGB 11.0*   HCT 34.0*   MCV 97.7*   MCH 31.6*   MCHC 32.4*   RDW 13.3      MPV 9.4       Recent Labs   Lab 05/30/23 0357      K 4.0   CO2 22*   BUN 41.3*   CREATININE 1.49*   CALCIUM 8.8   MG 1.90   ALBUMIN " 3.6   ALKPHOS 133   ALT 17   AST 23   BILITOT 0.3       Microbiology Results (last 7 days)       Procedure Component Value Units Date/Time    Urine culture [[id number]] Collected: 05/30/23 0346    Order Status: Sent Specimen: Urine Updated: 05/30/23 0507             CT Cervical Spine Without Contrast  Narrative: Technique:CT of the cervical spine was performed without intravenous contrast with axial as well as sagittal and coronal images.    Comparison:None.    Dosage Information:Automated exposure control was utilized.      Clinical history:Fall, facial injury.    Findings:    Lung apices:The visualized lung apices appear unremarkable.    Mineralization:Mild osteopenia is seen in the visualized bony structures of the cervical spine.    Rotation:Leftward rotation of C1 with respect to C2 is seen.    Scoliosis:No significant scoliosis is seen.    Vertebral Fusion:No vertebral fusion is identified.    Listhesis:No significant listhesis is identified.    Lordosis:Degenerative straightening of the cervical lordosis is seen.    Intervertebral disc spaces:Multilevel loss of disc height is seen.    Osteophytes:Multilevel endplate osteophytes are seen.    Endplate Sclerosis:Multilevel endplate sclerosis is seen.    Uncovertebral degenerative changes:Multilevel uncovertebral joint arthrosis is seen.    Facet degenerative changes:Multilevel facet degenerative changes are seen.    Fractures:No acute cervical spine fracture dislocation or subluxation is seen.    This exam does not exclude the possibility of intrathecal soft tissue, ligamentous or vascular injury.  Impression: Impression:    1. No acute cervical spine fracture dislocation or subluxation is seen.    2. Degenerative changes and other details as above.    No significant discrepancy with overnight report.    Electronically signed by: Cuco Posey  Date:    05/30/2023  Time:    07:40  CT Maxillofacial Without Contrast  Narrative: Technique:Noncontrast  maxillofacial CT was performed with axial as well as sagittal and coronal images being submitted for interpretation.    Automated exposure control was utilized to minimize radiation dose     Comparison:None.    Clinical history:Fall, facial injury.    Findings:    Orbital soft tissues:There is moderate soft tissue swelling in the right periorbital and preseptal spaces. There is internal hyperdensity within the swelling which is consistent with hematoma. This is consistent with soft tissue contusion with associated hematoma. No underlying bony injury is seen.    preseptal soft tissues:The left preseptal soft tissues appear unremarkable.    post- septal soft tissues:The bilateral extraconal soft tissues appear unremarkable. The bilateral intraconal soft tissues appear unremarkable.    Bones:    Orbital bony structures:The bilateral orbital bony structures are intact with no orbital fracture identified.    Mandible:The mandible appears unremarkable.    Maxilla:The maxilla appears unremarkable.    Pterygoid plates:No fracture identified of the right or left pterygoid plates.    Zygoma:The zygomatic arches are intact.    TMJ:The mandibular condyles appear normally placed with respect to the mandibular fossa.    Nasal Bones:The nasal septum is midline.    Skull:No acute linear or depressed fracture is identified in the visualized skull.    Paranasal sinuses:The visualized paranasal sinuses appear clear with no mucoperiosteal thickening or air fluid levels identified.    Mastoid air cells:The visualized mastoid air cells appear clear.    Brain:Intracranial findings discussed separately.  Impression: Impression:    1. There is moderate soft tissue swelling in the right periorbital and preseptal spaces. There is internal hyperdensity within the swelling which is consistent with hematoma. This is consistent with soft tissue contusion with associated hematoma. No underlying bony injury is seen.    2. No acute  maxillofacial fracture identified. Details and other findings as noted above.    No significant discrepancy with overnight report.    Electronically signed by: Cuco Posey  Date:    05/30/2023  Time:    07:37  CT Head Without Contrast  Narrative: Technique:CT of the head was performed without intravenous contrast with axial as well as coronal and sagittal images.    Comparison:None.    Dosage Information:Automated exposure control was utilized.  DLP 1337    Clinical history:Fall, facial injury.    Findings:    Hemorrhage:No acute intracranial hemorrhage is seen.    CSF spaces:The ventricles, sulci and basal cisterns all appear mildly prominent consistent with global cerebral atrophy.    Brain parenchyma:There is preservation of the grey white junction throughout. Mild to moderate microvascular change is seen in portions of the periventricular and deep white matter tracts.    Cerebellum:Unremarkable..    Calvarium:There is a focal lytic lesion in the left sphenoid wing on series 4 image 44. No acute linear or depressed skull fracture is seen.    Scalp:Minimal scalp soft tissue swelling is seen along the left parietal bone. The swelling is centered on image 20, Series 3. This consistent with a scalp contusion. No underlying bone injury is identified.    Maxillofacial Structures:Maxillofacial findings discussed separately in the maxillofacial CT report.  Impression: Impression:    1. Minimal scalp soft tissue swelling is seen along the left parietal bone. This consistent with a scalp contusion. No underlying bone injury is identified.    2. There is a focal lytic lesion in the left sphenoid wing on series 4 image 44. Correlate clinically as regards additional evaluation and follow-up.    3. No acute intracranial traumatic injury identified. Details and other findings as noted above.    No significant discrepancy with overnight report.    Electronically signed by: Cuco  Posey  Date:    05/30/2023  Time:    07:35        ASSESSMENT & PLAN:   Assessment:  Acute metabolic encephalopathy vs advancing dementia   UTI   SUSANNA  Focal lytic lesion in the left sphenoid wing  Essential hypertension  Hyperlipidemia    Plan:  IV Rocephin   Urine cultures pending, follow-up results   IVF  X-ray metastatic survey  PRN hydralazine   Continue appropriate home medications once med rec updated   Labs in a.m.    VTE Prophylaxis: SCDs    __________________________________________________________________________  INPATIENT LIST OF MEDICATIONS     Scheduled Meds:  Continuous Infusions:  PRN Meds:.      I, ROYER MunizC, have reviewed and discussed the case with Dr. Ramses Mcdaniel MD   Please see the following addendum for further assessment and plan from there attending MD.    05/30/2023    ________________________________________________________________________________    MD Addendum:  I,  assumed care of this patient today at ---am/pm  For the patient encounter, I performed the substantive portion of the visit, I reviewed the PA documentation, treatment plan, and medical decision making.  I had face to face time with this patient     A. History:    B. Physical exam:    C. Medical decision making:    Discharge Planning and Disposition: No mobility needs. Ambulating well. Good social support system.   Anticipated discharge    All diagnosis and differential diagnosis have been reviewed; assessment and plan has been documented; I have personally reviewed the labs and test results that are presently available; I have reviewed the patients medication list; I have reviewed the consulting providers response and recommendations. I have reviewed or attempted to review medical records based upon their availability.    All of the patient and family questions have been addressed and answered. Patient's is agreeable to the above stated plan. I will continue to monitor closely and make adjustments to medical  management as needed.      05/30/2023

## 2023-05-30 NOTE — CONSULTS
Ophthalmology Initial Consult Note    Patient Name: Kathleen Arevalo  Age: 77 y.o.  : 1945  MRN: [id number]  Admission Date: 2023    Reason for Consult:      Medical Management  Chief Complaint   Patient presents with    Fall     Pt was found walking around the neighborhood- PD intervened and unable to get pt into house, called aasi to eval dt bruised/ swollen r eye.  hx of dementia. Arrives with swollen right eye. No blood thinners noted in EMR and pt denies. Pt GCS 14 which is per [facility] baseline. Pt states she fell  and able to identify that today is Tuesday.            Self, Aaareferral    HPI:     Kathleen Arevalo is a 77 y.o. female         Current Facility-Administered Medications   Medication Dose Route Frequency Provider Last Rate Last Admin    0.9%  NaCl infusion   Intravenous Continuous Yamil Toledo PA-C 75 mL/hr at 23 0945 New Bag at 23 0945    acetaminophen tablet 650 mg  650 mg Oral Q8H PRN Yamil Toledo PA-C        acetaminophen tablet 650 mg  650 mg Oral Q4H PRN Yamil Toledo PA-C        [START ON 2023] cefTRIAXone (ROCEPHIN) 1 g in dextrose 5 % in water (D5W) 5 % 50 mL IVPB (MB+)  1 g Intravenous Q24H Yamil Toledo PA-C        dextrose 10% bolus 125 mL 125 mL  12.5 g Intravenous PRN Yamil Toledo PA-C        dextrose 10% bolus 250 mL 250 mL  25 g Intravenous PRN Yamil Toledo PA-C        glucagon (human recombinant) injection 1 mg  1 mg Intramuscular PRN Yamil Toledo PA-C        glucose chewable tablet 16 g  16 g Oral PRN Yamil Toledo PA-C        glucose chewable tablet 24 g  24 g Oral PRN Yamil Toledo PA-C        hydrALAZINE injection 5 mg  5 mg Intravenous Q4H PRN Ramses Mcdaniel MD        ondansetron injection 4 mg  4 mg Intravenous Q8H PRN Yamil Toledo PA-C           Primary Doctor No    Past Medical History:   Diagnosis Date    Cellulitis     Hypertension       Past Surgical History:   Procedure Laterality Date    HYSTERECTOMY        Family History    Problem Relation Age of Onset    Obesity Mother     Heart disease Father     Obesity Father     Dementia Maternal Aunt     Aneurysm Neg Hx     Cancer Neg Hx     Clotting disorder Neg Hx     Diabetes Neg Hx     Fainting Neg Hx     Hyperlipidemia Neg Hx     Kidney disease Neg Hx     Liver disease Neg Hx     Migraines Neg Hx     Neuropathy Neg Hx     Parkinsonism Neg Hx     Seizures Neg Hx     Stroke Neg Hx     Tremor Neg Hx      Social History     Tobacco Use    Smoking status: Former     Passive exposure: Never    Smokeless tobacco: Never   Substance Use Topics    Alcohol use: Not Currently     Comment: occ     Medications Prior to Admission   Medication Sig Dispense Refill Last Dose    amLODIPine (NORVASC) 10 MG tablet Take 10 mg by mouth once daily.       atorvastatin (LIPITOR) 40 MG tablet Take 1 tablet (40 mg total) by mouth once daily. 90 tablet 3     diaper,brief,adult,disposable Misc 1 each by Misc.(Non-Drug; Combo Route) route 2 (two) times a day. 20 each 1     diclofenac sodium (VOLTAREN) 1 % Gel Apply 2 g topically every 6 (six) hours as needed. 50 g 0     latanoprost 0.005 % ophthalmic solution Place 1 drop into both eyes every evening. Place 1 drop into both eyes every evening. 2.5 mL 12     losartan (COZAAR) 25 MG tablet Take 25 mg by mouth once daily.       meloxicam (MOBIC) 7.5 MG tablet Take 1 tablet (7.5 mg total) by mouth once daily. 90 tablet 1     metoprolol succinate (TOPROL-XL) 25 MG 24 hr tablet Take 1 tablet (25 mg total) by mouth once daily. 90 tablet 1     oxybutynin (DITROPAN) 5 MG Tab Take 1 tablet (5 mg total) by mouth once daily. 30 tablet 2     QUEtiapine (SEROQUEL) 25 MG Tab Take 1 tablet (25 mg total) by mouth daily as needed (Non directable agitation). 30 tablet 0     rivastigmine tartrate (EXELON) 3 MG capsule Take 1 capsule (3 mg total) by mouth 2 (two) times daily. 180 capsule 1     spironolactone (ALDACTONE) 25 MG tablet Take 1 tablet (25 mg total) by mouth once daily. 90  "tablet 1     traZODone (DESYREL) 50 MG tablet Take 1 tablet (50 mg total) by mouth every evening. 90 tablet 1     underpads 30 X 36 " Pads 1 each by Misc.(Non-Drug; Combo Route) route every evening. 30 each 2      Review of patient's allergies indicates:  No Known Allergies         Review of Systems:           Objective:       VITAL SIGNS: 24 HR MIN & MAX LAST    Temp  Min: 97.7 °F (36.5 °C)  Max: 98 °F (36.7 °C)  98 °F (36.7 °C)        BP  Min: 128/77  Max: 190/72  (!) 190/72     Pulse  Min: 56  Max: 74  (!) 56     Resp  Min: 16  Max: 20  20    SpO2  Min: 96 %  Max: 98 %  98 %        Va CF 2 feet OU. Pt would cooperate further.    PEERLA with no APD.    Periorbital edema and echymosis OD  Subconj here OD  Cataracts OU  Vitreous clear OU  Retina attached OU      XR Metastatic Survey   Final Result      CT Cervical Spine Without Contrast   Final Result   Impression:      1. No acute cervical spine fracture dislocation or subluxation is seen.      2. Degenerative changes and other details as above.      No significant discrepancy with overnight report.         Electronically signed by: Cuco Posey   Date:    05/30/2023   Time:    07:40      CT Head Without Contrast   Final Result   Impression:      1. Minimal scalp soft tissue swelling is seen along the left parietal bone. This consistent with a scalp contusion. No underlying bone injury is identified.      2. There is a focal lytic lesion in the left sphenoid wing on series 4 image 44. Correlate clinically as regards additional evaluation and follow-up.      3. No acute intracranial traumatic injury identified. Details and other findings as noted above.      No significant discrepancy with overnight report.         Electronically signed by: Cuco Posey   Date:    05/30/2023   Time:    07:35      CT Maxillofacial Without Contrast   Final Result   Impression:      1. There is moderate soft tissue swelling in the right periorbital and preseptal spaces. There is " internal hyperdensity within the swelling which is consistent with hematoma. This is consistent with soft tissue contusion with associated hematoma. No underlying bony injury is seen.      2. No acute maxillofacial fracture identified. Details and other findings as noted above.      No significant discrepancy with overnight report.         Electronically signed by: Cuco Posey   Date:    05/30/2023   Time:    07:37          Assessment / Plan:     78YO female with dementia found wandering around [place] and suspected to have fallen. Has right periorbital edema from the fall but no orbital fracture and no indication of significant ocular injury.    Periorbital edema will improve with time and cool compresses. Patient should follow up with her regular eye doctor or DR. Jackson in 1-2 weeks for comprehensive eye exam.       @[facility]@

## 2023-05-30 NOTE — NURSING
Spoke with patients  and let him know that she is here. Stated that he was told she was here but just had cataract surgery so he isnt able to drive. Asked if he could please notify her daughter to come to hospital. Stated that he would notify her

## 2023-05-30 NOTE — ED NOTES
If patient is assigned to floor and Ophthalmology has not seen patient; Per Dr. Jackson please contact his office with patient location in hospital. Demian Jackson #[phone]

## 2023-05-31 LAB
ALBUMIN SERPL-MCNC: 3.2 G/DL (ref 3.4–4.8)
ALBUMIN/GLOB SERPL: 1.2 RATIO (ref 1.1–2)
ALP SERPL-CCNC: 95 UNIT/L (ref 40–150)
ALT SERPL-CCNC: 14 UNIT/L (ref 0–55)
AST SERPL-CCNC: 21 UNIT/L (ref 5–34)
BASOPHILS # BLD AUTO: 0.03 X10(3)/MCL
BASOPHILS NFR BLD AUTO: 0.7 %
BILIRUBIN DIRECT+TOT PNL SERPL-MCNC: 0.2 MG/DL
BUN SERPL-MCNC: 16.8 MG/DL (ref 9.8–20.1)
CALCIUM SERPL-MCNC: 8.4 MG/DL (ref 8.4–10.2)
CHLORIDE SERPL-SCNC: 108 MMOL/L (ref 98–107)
CO2 SERPL-SCNC: 29 MMOL/L (ref 23–31)
CREAT SERPL-MCNC: 0.7 MG/DL (ref 0.55–1.02)
EOSINOPHIL # BLD AUTO: 0.1 X10(3)/MCL (ref 0–0.9)
EOSINOPHIL NFR BLD AUTO: 2.5 %
ERYTHROCYTE [DISTWIDTH] IN BLOOD BY AUTOMATED COUNT: 13.6 % (ref 11.5–17)
GFR SERPLBLD CREATININE-BSD FMLA CKD-EPI: >60 MLS/MIN/1.73/M2
GLOBULIN SER-MCNC: 2.7 GM/DL (ref 2.4–3.5)
GLUCOSE SERPL-MCNC: 82 MG/DL (ref 82–115)
HCT VFR BLD AUTO: 35.7 % (ref 37–47)
HGB BLD-MCNC: 12 G/DL (ref 12–16)
IMM GRANULOCYTES # BLD AUTO: 0.01 X10(3)/MCL (ref 0–0.04)
IMM GRANULOCYTES NFR BLD AUTO: 0.2 %
LYMPHOCYTES # BLD AUTO: 1.14 X10(3)/MCL (ref 0.6–4.6)
LYMPHOCYTES NFR BLD AUTO: 28.3 %
MCH RBC QN AUTO: 32.1 PG (ref 27–31)
MCHC RBC AUTO-ENTMCNC: 33.6 G/DL (ref 33–36)
MCV RBC AUTO: 95.5 FL (ref 80–94)
MONOCYTES # BLD AUTO: 0.45 X10(3)/MCL (ref 0.1–1.3)
MONOCYTES NFR BLD AUTO: 11.2 %
NEUTROPHILS # BLD AUTO: 2.3 X10(3)/MCL (ref 2.1–9.2)
NEUTROPHILS NFR BLD AUTO: 57.1 %
NRBC BLD AUTO-RTO: 0 %
PLATELET # BLD AUTO: 233 X10(3)/MCL (ref 130–400)
PMV BLD AUTO: 9 FL (ref 7.4–10.4)
POTASSIUM SERPL-SCNC: 4.1 MMOL/L (ref 3.5–5.1)
PROT SERPL-MCNC: 5.9 GM/DL (ref 5.8–7.6)
RBC # BLD AUTO: 3.74 X10(6)/MCL (ref 4.2–5.4)
SODIUM SERPL-SCNC: 142 MMOL/L (ref 136–145)
WBC # SPEC AUTO: 4.03 X10(3)/MCL (ref 4.5–11.5)

## 2023-05-31 PROCEDURE — 85025 COMPLETE CBC W/AUTO DIFF WBC: CPT | Performed by: PHYSICIAN ASSISTANT

## 2023-05-31 PROCEDURE — 25000003 PHARM REV CODE 250: Performed by: NURSE PRACTITIONER

## 2023-05-31 PROCEDURE — 63600175 PHARM REV CODE 636 W HCPCS: Performed by: NURSE PRACTITIONER

## 2023-05-31 PROCEDURE — 97166 OT EVAL MOD COMPLEX 45 MIN: CPT

## 2023-05-31 PROCEDURE — 80053 COMPREHEN METABOLIC PANEL: CPT | Performed by: PHYSICIAN ASSISTANT

## 2023-05-31 PROCEDURE — 21400001 HC TELEMETRY ROOM

## 2023-05-31 PROCEDURE — 25000003 PHARM REV CODE 250: Performed by: INTERNAL MEDICINE

## 2023-05-31 PROCEDURE — 25000003 PHARM REV CODE 250: Performed by: PHYSICIAN ASSISTANT

## 2023-05-31 PROCEDURE — 97162 PT EVAL MOD COMPLEX 30 MIN: CPT

## 2023-05-31 RX ORDER — LIDOCAINE HYDROCHLORIDE 10 MG/ML
4 INJECTION, SOLUTION EPIDURAL; INFILTRATION; INTRACAUDAL; PERINEURAL EVERY 24 HOURS
Status: DISCONTINUED | OUTPATIENT
Start: 2023-05-31 | End: 2023-05-31

## 2023-05-31 RX ORDER — RIVASTIGMINE TARTRATE 1.5 MG/1
1.5 CAPSULE ORAL 2 TIMES DAILY
Status: DISCONTINUED | OUTPATIENT
Start: 2023-05-31 | End: 2023-05-31

## 2023-05-31 RX ORDER — ATORVASTATIN CALCIUM 40 MG/1
40 TABLET, FILM COATED ORAL DAILY
Status: DISCONTINUED | OUTPATIENT
Start: 2023-05-31 | End: 2023-06-06 | Stop reason: HOSPADM

## 2023-05-31 RX ORDER — AMLODIPINE BESYLATE 5 MG/1
10 TABLET ORAL DAILY
Status: DISCONTINUED | OUTPATIENT
Start: 2023-05-31 | End: 2023-06-01

## 2023-05-31 RX ORDER — HALOPERIDOL 5 MG/ML
5 INJECTION INTRAMUSCULAR ONCE
Status: COMPLETED | OUTPATIENT
Start: 2023-05-31 | End: 2023-05-31

## 2023-05-31 RX ORDER — LATANOPROST 50 UG/ML
1 SOLUTION/ DROPS OPHTHALMIC NIGHTLY
Status: DISCONTINUED | OUTPATIENT
Start: 2023-05-31 | End: 2023-06-06 | Stop reason: HOSPADM

## 2023-05-31 RX ORDER — HALOPERIDOL 5 MG/ML
5 INJECTION INTRAMUSCULAR EVERY 4 HOURS PRN
Status: DISCONTINUED | OUTPATIENT
Start: 2023-05-31 | End: 2023-06-06 | Stop reason: HOSPADM

## 2023-05-31 RX ORDER — RIVASTIGMINE TARTRATE 1.5 MG/1
3 CAPSULE ORAL 2 TIMES DAILY
Status: DISCONTINUED | OUTPATIENT
Start: 2023-05-31 | End: 2023-06-06 | Stop reason: HOSPADM

## 2023-05-31 RX ORDER — CEFTRIAXONE 1 G/1
1000 INJECTION, POWDER, FOR SOLUTION INTRAMUSCULAR; INTRAVENOUS
Status: COMPLETED | OUTPATIENT
Start: 2023-05-31 | End: 2023-06-02

## 2023-05-31 RX ORDER — DICLOFENAC SODIUM 10 MG/G
2 GEL TOPICAL 4 TIMES DAILY
Status: DISCONTINUED | OUTPATIENT
Start: 2023-05-31 | End: 2023-06-06 | Stop reason: HOSPADM

## 2023-05-31 RX ORDER — CLONIDINE HYDROCHLORIDE 0.2 MG/1
0.2 TABLET ORAL EVERY 8 HOURS PRN
Status: DISCONTINUED | OUTPATIENT
Start: 2023-05-31 | End: 2023-06-06 | Stop reason: HOSPADM

## 2023-05-31 RX ORDER — HYDRALAZINE HYDROCHLORIDE 50 MG/1
100 TABLET, FILM COATED ORAL EVERY 8 HOURS
Status: DISCONTINUED | OUTPATIENT
Start: 2023-05-31 | End: 2023-06-06 | Stop reason: HOSPADM

## 2023-05-31 RX ORDER — LIDOCAINE HYDROCHLORIDE 10 MG/ML
3.6 INJECTION, SOLUTION EPIDURAL; INFILTRATION; INTRACAUDAL; PERINEURAL EVERY 24 HOURS
Status: DISCONTINUED | OUTPATIENT
Start: 2023-05-31 | End: 2023-06-04

## 2023-05-31 RX ORDER — LIDOCAINE HYDROCHLORIDE 10 MG/ML
1 INJECTION, SOLUTION EPIDURAL; INFILTRATION; INTRACAUDAL; PERINEURAL EVERY 24 HOURS
Status: DISCONTINUED | OUTPATIENT
Start: 2023-05-31 | End: 2023-05-31

## 2023-05-31 RX ORDER — QUETIAPINE FUMARATE 25 MG/1
50 TABLET, FILM COATED ORAL NIGHTLY
Status: DISCONTINUED | OUTPATIENT
Start: 2023-05-31 | End: 2023-06-06 | Stop reason: HOSPADM

## 2023-05-31 RX ORDER — HYDRALAZINE HYDROCHLORIDE 20 MG/ML
20 INJECTION INTRAMUSCULAR; INTRAVENOUS EVERY 8 HOURS PRN
Status: DISCONTINUED | OUTPATIENT
Start: 2023-05-31 | End: 2023-06-06 | Stop reason: HOSPADM

## 2023-05-31 RX ADMIN — SODIUM CHLORIDE: 9 INJECTION, SOLUTION INTRAVENOUS at 12:05

## 2023-05-31 RX ADMIN — AMLODIPINE BESYLATE 10 MG: 5 TABLET ORAL at 02:05

## 2023-05-31 RX ADMIN — CEFTRIAXONE SODIUM 1000 MG: 1 INJECTION, POWDER, FOR SOLUTION INTRAMUSCULAR; INTRAVENOUS at 07:05

## 2023-05-31 RX ADMIN — HYDRALAZINE HYDROCHLORIDE 100 MG: 50 TABLET, FILM COATED ORAL at 07:05

## 2023-05-31 RX ADMIN — DICLOFENAC SODIUM 2 G: 10 GEL TOPICAL at 09:05

## 2023-05-31 RX ADMIN — HYDRALAZINE HYDROCHLORIDE 100 MG: 50 TABLET, FILM COATED ORAL at 11:05

## 2023-05-31 RX ADMIN — LIDOCAINE HYDROCHLORIDE 36 MG: 10 INJECTION, SOLUTION EPIDURAL; INFILTRATION; INTRACAUDAL; PERINEURAL at 07:05

## 2023-05-31 RX ADMIN — ACETAMINOPHEN 650 MG: 325 TABLET ORAL at 06:05

## 2023-05-31 RX ADMIN — RIVASTIGMINE TARTRATE 3 MG: 1.5 CAPSULE ORAL at 10:05

## 2023-05-31 RX ADMIN — CLONIDINE HYDROCHLORIDE 0.2 MG: 0.2 TABLET ORAL at 10:05

## 2023-05-31 RX ADMIN — ATORVASTATIN CALCIUM 40 MG: 40 TABLET, FILM COATED ORAL at 02:05

## 2023-05-31 RX ADMIN — LATANOPROST 1 DROP: 50 SOLUTION OPHTHALMIC at 09:05

## 2023-05-31 RX ADMIN — QUETIAPINE FUMARATE 50 MG: 25 TABLET ORAL at 09:05

## 2023-05-31 RX ADMIN — HALOPERIDOL LACTATE 5 MG: 5 INJECTION, SOLUTION INTRAMUSCULAR at 02:05

## 2023-05-31 RX ADMIN — RIVASTIGMINE TARTRATE 3 MG: 1.5 CAPSULE ORAL at 02:05

## 2023-05-31 NOTE — PLAN OF CARE
Problem: Physical Therapy  Goal: Physical Therapy Goal  Description: Goals to be met by: 23     Patient will increase functional independence with mobility by performin. Sit to stand transfer with Modified [place]  2. Gait  x 300 feet with Modified [place] using Rolling Walker.     Outcome: Ongoing, Progressing

## 2023-05-31 NOTE — PROGRESS NOTES
"5/31/2023  Kathleen Arevalo   1945   [id number]            Psychiatry Inpatient Admission Note    Date of Admission: 5/30/2023  2:03 AM    Current Legal Status:     Chief Complaint: "I am at school to read and understand".    SUBJECTIVE:   History of Present Illness:   Kathleen Arevalo is a 77 y.o. female with a known past medical history of essential hypertension, hyperlipidemia, dementia who was brought in last night on 05/30/2023 where EMS per police for altered mental status.  Patient was found wandering around in a neighborhood.  The police was able to get her back to her home but it was locked and she was unable to get in.  History is very limited given patient is a poor historian.  Most of the information was gathered from hospital chart, medical records and ED staff.    Did collateral information from her daughter. She was diagnosed with dementia 5 years ago. Her daughter indicates she has been treated for anxiety x 3 years. She receives treatment with [facility]. Her daughter indicates she brings her mother to the appointments. Her last appointment was 3/8/2023. Her daughter indicates her mother wanders a lot. Her father has difficulty prevent her from wandering. Her daughter states she will keep up during the night to leave the home. Neighbors will contact the daughter when they notice her wandering. Her daughter reports she gets violent if she does not want to comply with redirections. There are times her mother will become verbally inappropriate. Her daughter reports she does not sleep well. The only time her mother sleeps well is when the entire family is at the home. Appetite is decreased. Her mother will eat when she is encouraged. Otherwise, her mother will not eat. Energy is good. Her daughter responds to AVH. She told her daughter that she had a man in her home. Her daughter has to administer her meds for compliance.     During eval, she was preoccupied with being in the 7th grade. She " indicated she had to finish school. She was disoriented to month, year, place, state, season, her age. She was able to recall her birthday.       Past Psychiatric History:   Previous Psychiatric Hospitalizations: No history   Previous Medication Trials: Exelon, Seroquel, Trazodone, Aricept, Namenda  Previous Suicide Attempts: No history    Outpatient psychiatrist: [facility]    Past Medical/Surgical History:   Past Medical History:   Diagnosis Date    Cellulitis     Hypertension      Past Surgical History:   Procedure Laterality Date    HYSTERECTOMY         Family Psychiatric History:   No family history     Allergies:   Review of patient's allergies indicates:  No Known Allergies    Substance Abuse History:   Tobacco: Stopped smoking 40 years ago  Alcohol: No history of alcohol use  Illicit Substances: No history   Treatment: No history      Current Medications:   Home Psychiatric Meds: Exelon 3mg po qday, Seroquel 25mg po qhs, Trazodone 50mg po qhs    Scheduled Meds:    cefTRIAXone  1,000 mg Intramuscular Q24H    hydrALAZINE  100 mg Oral Q8H    LIDOcaine (PF) 10 mg/ml (1%)  3.6 mL Intramuscular Daily      PRN Meds: acetaminophen, acetaminophen, dextrose 10%, dextrose 10%, glucagon (human recombinant), glucose, glucose, haloperidol lactate, hydrALAZINE, labetaloL, ondansetron   Psychotherapeutics (From admission, onward)      Start     Stop Route Frequency Ordered    05/31/23 0600  haloperidol lactate injection 5 mg         -- IM Every 4 hours PRN 05/31/23 0334              Social History:  Housing Status: resides with her   Relationship Status/Sexual Orientation: heterosexual   Children: 5  Education: completed 9th grade, dropped out of school to raise her siblings after death of her mother   Employment Status/Info: 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[email or web address]" history: no history  History of physical/sexual abuse: no history    Access to gun: no access to guns       Legal  History:   Past Charges/Incarcerations: no history    Pending charges: no history      OBJECTIVE:   Medical Review Of Systems:  Behavioral/Psych: positive for anxiety and cognitive impairment    Vitals   Vitals:    05/31/23 0700   BP: (!) 184/76   Pulse: (!) 58   Resp: 16   Temp: 97.1 °F (36.2 °C)        Labs/Imaging/Studies:   Recent Results (from the past 48 hour(s))   COVID/FLU A&B PCR    Collection Time: 05/30/23  3:26 AM   Result Value Ref Range    Influenza A PCR Not Detected Not Detected    Influenza B PCR Not Detected Not Detected    SARS-CoV-2 PCR Not Detected Not Detected, Negative, Invalid   Drug Screen, Urine    Collection Time: 05/30/23  3:46 AM   Result Value Ref Range    Amphetamines, Urine Negative Negative    Barbituates, Urine Negative Negative    Benzodiazepine, Urine Negative Negative    Cannabinoids, Urine Negative Negative    Cocaine, Urine Negative Negative    Fentanyl, Urine Negative Negative    MDMA, Urine Negative Negative    Opiates, Urine Negative Negative    Phencyclidine, Urine Negative Negative    pH, Urine 6.0 3.0 - 11.0   Urinalysis, Reflex to Urine Culture    Collection Time: 05/30/23  3:46 AM    Specimen: Urine   Result Value Ref Range    Color, UA Yellow Yellow, Light-Yellow, Dark Yellow, Heena, Straw    Appearance, UA Clear Clear    Specific Gravity, UA 1.023 1.005 - 1.030    pH, UA 6.0 5.0 - 8.5    Protein, UA Negative Negative mg/dL    Glucose, UA Negative Negative, Normal mg/dL    Ketones, UA Negative Negative mg/dL    Blood, UA Negative Negative unit/L    Bilirubin, UA Negative Negative mg/dL    Urobilinogen, UA 1.0 0.2, 1.0, Normal mg/dL    Nitrites, UA Positive (A) Negative    Leukocyte Esterase, UA 2+ (A) Negative unit/L   Urinalysis, Microscopic    Collection Time: 05/30/23  3:46 AM   Result Value Ref Range    RBC, UA <5 <=5 /HPF    WBC, UA 26 (H) <=5 /HPF    Squamous Epithelial Cells, UA <5 <=5 /HPF    Bacteria, UA 4+ (A) None Seen, Rare, Occasional /HPF   Urine culture     Collection Time: 05/30/23  3:46 AM    Specimen: Urine   Result Value Ref Range    Urine Culture >/= 100,000 colonies/ml Gram-negative Rods (A)    APTT    Collection Time: 05/30/23  3:56 AM   Result Value Ref Range    PTT 29.8 23.2 - 33.7 seconds   Protime-INR    Collection Time: 05/30/23  3:56 AM   Result Value Ref Range    PT 15.1 (H) 12.5 - 14.5 seconds    INR 1.21 0.00 - 1.30   Brain natriuretic peptide    Collection Time: 05/30/23  3:57 AM   Result Value Ref Range    Natriuretic Peptide 31.6 <=100.0 pg/mL   Comprehensive metabolic panel    Collection Time: 05/30/23  3:57 AM   Result Value Ref Range    Sodium Level 140 136 - 145 mmol/L    Potassium Level 4.0 3.5 - 5.1 mmol/L    Chloride 107 98 - 107 mmol/L    Carbon Dioxide 22 (L) 23 - 31 mmol/L    Glucose Level 103 82 - 115 mg/dL    Blood Urea Nitrogen 41.3 (H) 9.8 - 20.1 mg/dL    Creatinine 1.49 (H) 0.55 - 1.02 mg/dL    Calcium Level Total 8.8 8.4 - 10.2 mg/dL    Protein Total 6.4 5.8 - 7.6 gm/dL    Albumin Level 3.6 3.4 - 4.8 g/dL    Globulin 2.8 2.4 - 3.5 gm/dL    Albumin/Globulin Ratio 1.3 1.1 - 2.0 ratio    Bilirubin Total 0.3 <=1.5 mg/dL    Alkaline Phosphatase 133 40 - 150 unit/L    Alanine Aminotransferase 17 0 - 55 unit/L    Aspartate Aminotransferase 23 5 - 34 unit/L    eGFR 36 mls/min/1.73/m2   Ethanol    Collection Time: 05/30/23  3:57 AM   Result Value Ref Range    Ethanol Level <10.0 <=10.0 mg/dL   Magnesium    Collection Time: 05/30/23  3:57 AM   Result Value Ref Range    Magnesium Level 1.90 1.60 - 2.60 mg/dL   Troponin I    Collection Time: 05/30/23  3:57 AM   Result Value Ref Range    Troponin-I <0.010 0.000 - 0.045 ng/mL   CBC with Differential    Collection Time: 05/30/23  3:57 AM   Result Value Ref Range    WBC 5.95 4.50 - 11.50 x10(3)/mcL    RBC 3.48 (L) 4.20 - 5.40 x10(6)/mcL    Hgb 11.0 (L) 12.0 - 16.0 g/dL    Hct 34.0 (L) 37.0 - 47.0 %    MCV 97.7 (H) 80.0 - 94.0 fL    MCH 31.6 (H) 27.0 - 31.0 pg    MCHC 32.4 (L) 33.0 - 36.0 g/dL     RDW 13.3 11.5 - 17.0 %    Platelet 214 130 - 400 x10(3)/mcL    MPV 9.4 7.4 - 10.4 fL    Neut % 67.3 %    Lymph % 22.2 %    Mono % 8.1 %    Eos % 1.3 %    Basophil % 0.8 %    Lymph # 1.32 0.6 - 4.6 x10(3)/mcL    Neut # 4.00 2.1 - 9.2 x10(3)/mcL    Mono # 0.48 0.1 - 1.3 x10(3)/mcL    Eos # 0.08 0 - 0.9 x10(3)/mcL    Baso # 0.05 <=0.2 x10(3)/mcL    IG# 0.02 0 - 0.04 x10(3)/mcL    IG% 0.3 %    NRBC% 0.0 %      No results found for: PHENYTOIN, PHENOBARB, VALPROATE, CBMZ        Psychiatric Mental Status Exam:  General Appearance: appears stated age, dressed in hospital garb, lying in bed  Arousal: alert  Behavior: minimal responses, restless and fidgety  Movements and Motor Activity: no abnormal involuntary movements noted  Orientation: oriented to person only  Speech: slowed, soft  Mood: Anxious  Affect: flat  Thought Process: concrete  Associations: no loosening of associations  Thought Content and Perceptions: + auditory hallucinations, + visual hallucinations  Recent and Remote Memory: impaired; per interview/observation with patient  Attention and Concentration: easily distractible, able to spell WORLD forwards, unable to complete serial 7's; per interview/observation with patient  Fund of Knowledge: impaired, unable to identify the ; based on history, vocabulary, fund of knowledge, syntax, grammar, and content  Insight: impaired; based on understanding of severity of illness and HPI  Judgment: impaired; based on patient's behavior and HPI      Patient Strengths:  Access to care and Family/Peer support      Patient Liabilities:  Cognitive impairment      Discharge Criteria:  Improved mood, Medication compliance, and Improved social functioning      Reason for Admission:  The patient is gravely disabled due to a psychiatric condition.    ASSESSMENT/PLAN:   Diagnoses:  DELIRIUM, DEMENTIA, AND AMNESTIC AND OTHER COGNITIVE DISORDERS; Dementia: Dementia NOS (F03.90) (F02.80) and ANXIETY  DISORDERS; 7.9.Generalized Anxiety Disorder (F41.1)     NO DIAGNOSIS ON AXIS II (Z03.89)     Past Medical History:   Diagnosis Date    Cellulitis     Hypertension           Problem lists and Management Plans:  Resume Exelon 3mg po bid  Increase Seroquel 50mg po qhs  Consider Zoloft for anxiety      Estimated Disposition:  Nursing home setting per request of family    Estimated Follow-up: Outpatient medication management      On this date, I have reviewed the medical history and Nursing Assessment, as well as records from referral source.  I have evaluated the mental status of the above named person and concur with the findings of all assessments.  I have provided medical direction for the development of the Treatment Plan.      Kristina Henderson

## 2023-05-31 NOTE — PT/OT/SLP EVAL
Physical Therapy Evaluation    Patient Name:  Kathleen Arevalo   MRN:  [id number]    Recommendations:     Discharge Recommendations: nursing facility, skilled, nursing facility, basic   Discharge Equipment Recommendations: walker, rolling   Barriers to discharge:  safety awareness    Assessment:     Kathleen Arevalo is a 77 y.o. female admitted with AMS. Per chart, patient was found by police wandering around neighborhood. Right eye swollen from fall that occurred on Sunday. She has history of dementia. At time of PT evaluation, patient presents with the following impairments/functional limitations: weakness, gait instability, impaired balance, decreased safety awareness. No assistance needed for bed mobility. She ambulated 32 ft with RW & CGA-MIN A. Balance and safety awareness were extremely poor. Recommending [facility] vs SNF placement at discharge. Progress patient as tolerated.     Rehab Prognosis: Fair; patient would benefit from acute skilled PT services to address these deficits and reach maximum level of function.    Recent Surgery: * No surgery found *      Plan:     During this hospitalization, patient to be seen 3 x/week to address the identified rehab impairments via gait training, therapeutic activities, therapeutic exercises, neuromuscular re-education and progress toward the following goals:    Plan of Care Expires:  06/30/23    Subjective     Chief Complaint: none  Patient/Family Comments/goals: none  Pain/Comfort:  Pain Rating 1: 0/10    Patients cultural, spiritual, Jain conflicts given the current situation: no    Living Environment:  Unknown  Prior to admission, patients level of function was unknown.  Equipment used at home:  (unknown).  DME owned (not currently used):  unknown .  Upon discharge, patient will have assistance from TBD.    Objective:     Communicated with nurse prior to session.  Patient found supine with telemetry  upon PT entry to room.    General Precautions: Standard,  fall  Orthopedic Precautions:N/A   Braces: N/A  Respiratory Status: Room air    Exams:  Cognitive Exam:  Patient is oriented to Person  Sensation:    -       Intact  RLE ROM: WFL  RLE Strength: unable to accurately assess 2/2 confusion  LLE ROM: WFL  LLE Strength: unable to accurately assess 2/2 confusion  Skin integrity: Visible skin intact      Functional Mobility:  Bed Mobility:     Supine to Sit: independence  Sit to Supine: independence  Transfers:     Sit to Stand:  minimum assistance with rolling walker  Gait: 32 ft with RW & CGA-MIN A. Poor balance and safety awareness  Balance: poor      AM-PAC 6 CLICK MOBILITY  Total Score:20       Education Provided:  Role and goals of PT, transfer training, bed mobility, gait training, balance training, safety awareness, assistive device, strengthening exercises, deep breathing techniques, and importance of participating in PT to return to PLOF.    Expected compliance:  low      Patient left left sidelying with all lines intact, call button in reach, and 1-on-1 sitter present.    GOALS:   Multidisciplinary Problems       Physical Therapy Goals          Problem: Physical Therapy    Goal Priority Disciplines Outcome Goal Variances Interventions   Physical Therapy Goal     PT, PT/OT Ongoing, Progressing     Description: Goals to be met by: 23     Patient will increase functional independence with mobility by performin. Sit to stand transfer with Modified [place]  2. Gait  x 300 feet with Modified [place] using Rolling Walker.                          History:     Past Medical History:   Diagnosis Date    Cellulitis     Hypertension        Past Surgical History:   Procedure Laterality Date    HYSTERECTOMY         Time Tracking:     PT Received On: 23  PT Start Time: 0850     PT Stop Time: 0910  PT Total Time (min): 20 min     Billable Minutes: Evaluation 20 minutes      2023

## 2023-05-31 NOTE — PROGRESS NOTES
[facility]  Hospital Medicine Progress Note        A. History:   History is very limited given patient is a poor historian.  Most of the information was gathered from hospital chart, medical records and ED staff  This is a 77-year-old female with a known past medical history of essential hypertension, hyperlipidemia, dementia who was brought in last night on 05/30/2023 where EMS per police for altered mental status.  Patient was found wandering around in a neighborhood.  The police was able to get her back to her home but it was locked and she was unable to get in.  Please also noted bruising and swelling of her right eye.  She reported to police she fell Sunday.  Today on our questioning she said she fell yesterday.  To the ED the she told them she fell a month ago.  Currently she is denying any complaints.  She said off and on she hurts.  She mentioned that she was going to drop her mom off to the , pleasantly confused.  She asked if she can stay in the hospital.  Informed her we are admitting her and treating her urinary tract infection and will take care of her.  She smiled and closed her eyes  Reviewed patient's labs, she has a mild SUSANNA with creatinine of 1.4, when compared to her last labs in October 2022 when creatinine was normal  On CT of her head, it shows minimal scalp soft tissue swelling along the left parietal bone consistent with scalp contusion.  Focal lytic lesion in the left sphenoid wing  CT maxillofacial shows moderate swelling in the right periorbital and norberto septal space.  There is internal hyperdensity within the swelling which is consistent with hematoma.  No underlying bony injury seen  Ordered XR metastatic survey no urinalysis did not show proteinuria  UA does shows UTI, urine cultures in process       Patient currently being managed for acute on chronic encephalopathy, acute UTI, right periorbital edema with hematoma.      Today's  information  Patient seen and examined at bedside, sitter at bedside  Overnight ophthalmology evaluation was conducted - nothing to do to follow-up with the outpatient ophthalmologist Patient she was pulling at lines and getting agitated had to place a sitter   Vitals reviewed currently stable, afebrile   Labs reviewed, stable   X-ray metastatic survey was negative   Urine cultures growing Gram-negative rods       B. Physical exam:  Vitals reviewed.  General:  Right periorbital edema, eyes is closed.  Black eye, Pleasantly confused, awake   Chest: Clear to auscultation bilaterally anteriorly.    Heart: S1, S2, no appreciable murmur  Abdomen: Soft, nontender, BS +, abdominal binder on  MSK: Warm, 1+ pitting edema bilateral lower extremity, positive ankle swelling as well bilaterally  Neurologic:  Awake but unable to assess orientation.  Unable to answer most of the questions.  Able to move all 4 extremities        C. Medical decision making:  Acute metabolic encephalopathy vs chronic with advancing dementia   Right periorbital swelling/hematoma  GNR UTI - POA,   SUSANNA  1 Focal lytic lesion in the left sphenoid wing  Essential hypertension  Hyperlipidemia  Dementia with behavioral disturbances versus delirium       Plan  Vitals reviewed currently stable, afebrile   Labs reviewed, stable   X-ray metastatic survey was negative   Urine cultures growing Gram-negative rods   Continue 1 on 1 sitter  continue Seroquel at bedtime 50 mg   Continue IV Rocephin day 2 of 3, follow-up urine cultures till complete   Appreciate ophthalmology input to follow-up outpatient, nothing to do at this time   Continue IV fluids, trend BMP   PRN iv hydralazine for systolic blood pressure greater than 160  Continue appropriate home medications once med rec updated, ED nurses unable to get in touch with any family member regarding her medications  Noted in the ED, Case Management has contacted EPS as well, probable a placement, could be a  difficult placement if family is un reachable/financial information etc.  PT OT consulted to evaluate for strength and weaknesses      VTE Prophylaxis:    SCD, will avoid chemical prophylaxis given hematoma right periorbital region     Patient condition:   Fair     Discharge Planning and Disposition/Anticipated discharge: TBD        Smoking status unknown as pt is unable to answer the question     Advanced directives were not discussed given pt mental status and no family is reachable            VITAL SIGNS: 24 HRS MIN & MAX LAST   Temp  Min: 97.1 °F (36.2 °C)  Max: 98.5 °F (36.9 °C) 97.8 °F (36.6 °C)   BP  Min: 146/63  Max: 190/72 (!) 188/71   Pulse  Min: 56  Max: 71  (!) 57   Resp  Min: 16  Max: 20 18   SpO2  Min: 97 %  Max: 100 % 99 %     I have reviewed the following labs:    Recent Labs   Lab 05/30/23 0357   WBC 5.95   RBC 3.48*   HGB 11.0*   HCT 34.0*   MCV 97.7*   MCH 31.6*   MCHC 32.4*   RDW 13.3      MPV 9.4       Recent Labs   Lab 05/30/23 0357      K 4.0   CO2 22*   BUN 41.3*   CREATININE 1.49*   CALCIUM 8.8   MG 1.90   ALBUMIN 3.6   ALKPHOS 133   ALT 17   AST 23   BILITOT 0.3          Microbiology Results (last 7 days)       Procedure Component Value Units Date/Time    Urine culture [[id number]]  (Abnormal) Collected: 05/30/23 0346    Order Status: Completed Specimen: Urine Updated: 05/31/23 0655     Urine Culture >/= 100,000 colonies/ml Gram-negative Rods             See below for Radiology    Scheduled Med:   cefTRIAXone  1,000 mg Intramuscular Q24H    hydrALAZINE  100 mg Oral Q8H    LIDOcaine (PF) 10 mg/ml (1%)  3.6 mL Intramuscular Daily    QUEtiapine  50 mg Oral QHS    rivastigmine tartrate  3 mg Oral BID        Continuous Infusions:   sodium chloride 0.9% 75 mL/hr at 05/31/23 0041        PRN Meds:  acetaminophen, acetaminophen, dextrose 10%, dextrose 10%, glucagon (human recombinant), glucose, glucose, haloperidol lactate, hydrALAZINE, labetaloL, ondansetron        Assessment/Plan:      VTE prophylaxis:     Patient condition:  Stable/Fair/Guarded/ Serious/ Critical    Anticipated discharge and Disposition:         All diagnosis and differential diagnosis have been reviewed; assessment and plan has been documented; I have personally reviewed the labs and test results that are presently available; I have reviewed the patients medication list; I have reviewed the consulting providers response and recommendations. I have reviewed or attempted to review medical records based upon their availability    All of the patient's questions have been  addressed and answered. Patient's is agreeable to the above stated plan. I will continue to monitor closely and make adjustments to medical management as needed.  _____________________________________________________________________    Nutrition Status:    Radiology:  I have personally reviewed the following imaging and agree with the radiologist.     XR Metastatic Survey  EXAMINATION  XR METASTATIC SURVEY    CLINICAL HISTORY  1 Lytic lesion on CT, poor historiam, no family available.;    TECHNIQUE  A total of 22 images of the axial and appendicular skeletal structures were obtained for purposes of metastatic survey.    COMPARISON  None available at the time of initial interpretation.    FINDINGS  The visualized osseous structures are without convincing expansile, lytic, or sclerotic lesion. No findings to suggest focal bone disease are appreciated. The trabecular pattern is unremarkable.    There is no acute vertebral column malalignment, cortical displacement, or articular subluxation.  Regional degenerative changes are noted.  No skeletal asymmetry is appreciated.    The included soft tissues are without convincing acute abnormality.  Scattered vascular calcification is present.  No focal abnormality of the visualized intrathoracic and abdominopelvic regions is appreciated.    IMPRESSION  1. No convincing destructive skeletal lesion  by radiographic assessment.  2. Chronic secondary details discussed above.    Electronically signed by: Amilcar Stern  Date:    05/30/2023  Time:    13:42  CT Cervical Spine Without Contrast  Narrative: Technique:CT of the cervical spine was performed without intravenous contrast with axial as well as sagittal and coronal images.    Comparison:None.    Dosage Information:Automated exposure control was utilized.      Clinical history:Fall, facial injury.    Findings:    Lung apices:The visualized lung apices appear unremarkable.    Mineralization:Mild osteopenia is seen in the visualized bony structures of the cervical spine.    Rotation:Leftward rotation of C1 with respect to C2 is seen.    Scoliosis:No significant scoliosis is seen.    Vertebral Fusion:No vertebral fusion is identified.    Listhesis:No significant listhesis is identified.    Lordosis:Degenerative straightening of the cervical lordosis is seen.    Intervertebral disc spaces:Multilevel loss of disc height is seen.    Osteophytes:Multilevel endplate osteophytes are seen.    Endplate Sclerosis:Multilevel endplate sclerosis is seen.    Uncovertebral degenerative changes:Multilevel uncovertebral joint arthrosis is seen.    Facet degenerative changes:Multilevel facet degenerative changes are seen.    Fractures:No acute cervical spine fracture dislocation or subluxation is seen.    This exam does not exclude the possibility of intrathecal soft tissue, ligamentous or vascular injury.  Impression: Impression:    1. No acute cervical spine fracture dislocation or subluxation is seen.    2. Degenerative changes and other details as above.    No significant discrepancy with overnight report.    Electronically signed by: Cuco Posey  Date:    05/30/2023  Time:    07:40  CT Maxillofacial Without Contrast  Narrative: Technique:Noncontrast maxillofacial CT was performed with axial as well as sagittal and coronal images being submitted for  interpretation.    Automated exposure control was utilized to minimize radiation dose     Comparison:None.    Clinical history:Fall, facial injury.    Findings:    Orbital soft tissues:There is moderate soft tissue swelling in the right periorbital and preseptal spaces. There is internal hyperdensity within the swelling which is consistent with hematoma. This is consistent with soft tissue contusion with associated hematoma. No underlying bony injury is seen.    preseptal soft tissues:The left preseptal soft tissues appear unremarkable.    post- septal soft tissues:The bilateral extraconal soft tissues appear unremarkable. The bilateral intraconal soft tissues appear unremarkable.    Bones:    Orbital bony structures:The bilateral orbital bony structures are intact with no orbital fracture identified.    Mandible:The mandible appears unremarkable.    Maxilla:The maxilla appears unremarkable.    Pterygoid plates:No fracture identified of the right or left pterygoid plates.    Zygoma:The zygomatic arches are intact.    TMJ:The mandibular condyles appear normally placed with respect to the mandibular fossa.    Nasal Bones:The nasal septum is midline.    Skull:No acute linear or depressed fracture is identified in the visualized skull.    Paranasal sinuses:The visualized paranasal sinuses appear clear with no mucoperiosteal thickening or air fluid levels identified.    Mastoid air cells:The visualized mastoid air cells appear clear.    Brain:Intracranial findings discussed separately.  Impression: Impression:    1. There is moderate soft tissue swelling in the right periorbital and preseptal spaces. There is internal hyperdensity within the swelling which is consistent with hematoma. This is consistent with soft tissue contusion with associated hematoma. No underlying bony injury is seen.    2. No acute maxillofacial fracture identified. Details and other findings as noted above.    No significant discrepancy  with overnight report.    Electronically signed by: Cuco Posey  Date:    05/30/2023  Time:    07:37  CT Head Without Contrast  Narrative: Technique:CT of the head was performed without intravenous contrast with axial as well as coronal and sagittal images.    Comparison:None.    Dosage Information:Automated exposure control was utilized.  DLP 1337    Clinical history:Fall, facial injury.    Findings:    Hemorrhage:No acute intracranial hemorrhage is seen.    CSF spaces:The ventricles, sulci and basal cisterns all appear mildly prominent consistent with global cerebral atrophy.    Brain parenchyma:There is preservation of the grey white junction throughout. Mild to moderate microvascular change is seen in portions of the periventricular and deep white matter tracts.    Cerebellum:Unremarkable..    Calvarium:There is a focal lytic lesion in the left sphenoid wing on series 4 image 44. No acute linear or depressed skull fracture is seen.    Scalp:Minimal scalp soft tissue swelling is seen along the left parietal bone. The swelling is centered on image 20, Series 3. This consistent with a scalp contusion. No underlying bone injury is identified.    Maxillofacial Structures:Maxillofacial findings discussed separately in the maxillofacial CT report.  Impression: Impression:    1. Minimal scalp soft tissue swelling is seen along the left parietal bone. This consistent with a scalp contusion. No underlying bone injury is identified.    2. There is a focal lytic lesion in the left sphenoid wing on series 4 image 44. Correlate clinically as regards additional evaluation and follow-up.    3. No acute intracranial traumatic injury identified. Details and other findings as noted above.    No significant discrepancy with overnight report.    Electronically signed by: Cuco Posey  Date:    05/30/2023  Time:    07:35      German Boateng MD   05/31/2023

## 2023-05-31 NOTE — PLAN OF CARE
05/31/23 1253   Discharge Assessment   Assessment Type Discharge Planning Assessment   Confirmed/corrected address, phone number and insurance Yes   Confirmed Demographics Correct on Facesheet   Source of Information family   When was your last doctors appointment?   (Patient's daughter, Ashley Esposito reports May 8, 2023.)   Communicated SANDRA with patient/caregiver Yes   Reason For Admission AMS   People in Home spouse   Do you expect to return to your current living situation? No   Do you have help at home or someone to help you manage your care at home? Yes   Who are your caregiver(s) and their phone number(s)? Daughter: Ashley Esposito: [phone]   Prior to hospitilization cognitive status: Unable to Assess   Current cognitive status: Inappropriate Behavior   Home Accessibility wheelchair accessible   Home Layout Able to live on 1st floor   Equipment Currently Used at Home walker, rolling   Readmission within 30 days? No   Patient currently being followed by outpatient case management? No   Do you currently have service(s) that help you manage your care at home? No   Do you take prescription medications? Yes   Do you have prescription coverage? Yes   Coverage Medicare Part A & B   Do you have any problems affording any of your prescribed medications? No   Is the patient taking medications as prescribed? yes   Who is going to help you get home at discharge? New Nursing Home Placement.   How do you get to doctors appointments? family or friend will provide   Are you on dialysis? No   Do you take coumadin? No   Discharge Plan A New Nursing Home placement - senior care care facility   Discharge Plan B Home   DME Needed Upon Discharge  none   Discharge Plan discussed with: Adult children   Transition of Care Barriers None   OTHER   Name(s) of People in Home Patient resides with her spouse.       SW completed Discharge Assessment with patient's daughter, Ashley Esposito  ([phone]).  Patient's PCP is Dr. Liz Coronado.  Patient's daughter would like snf placement at [facility]. SW obtained verbal approval from pt's daughter via phone. Sw will begin snf placement today.  No barriers to discharge at this time.

## 2023-05-31 NOTE — PLAN OF CARE
Problem: Occupational Therapy  Goal: Occupational Therapy Goal  Description: Goals to be met by: 6/28/23     Patient will increase functional independence with ADLs by performing:    UE Dressing with Stand-by Assistance.  LE Dressing with Stand-by Assistance.  Grooming while standing with Stand-by Assistance.  Toileting from toilet with Stand-by Assistance for hygiene and clothing management.   Toilet transfer to toilet with Stand-by Assistance.    Outcome: Ongoing, Progressing

## 2023-05-31 NOTE — PT/OT/SLP EVAL
Occupational Therapy  Evaluation    Name: Kathleen Arevalo  MRN: [id number]  Admitting Diagnosis: <principal problem not specified>  Recent Surgery: * No surgery found *      Recommendations:     Discharge Recommendations: nursing facility, skilled, nursing facility, basic  Discharge Equipment Recommendations:   (tbd)  Barriers to discharge:  Decreased caregiver support    Assessment:     Kathleen Arevalo is a 77 y.o. female with a medical diagnosis of AMS, found wandering the streets.   She presents with poor insight and safety awareness, poor memory, not oriented, but can follow some basic commands for mobility and ADL's. Performance deficits affecting function: weakness, impaired endurance, impaired self care skills, impaired functional mobility, impaired cognition, decreased safety awareness.      Rehab Prognosis: Good; patient would benefit from acute skilled OT services to address these deficits and reach maximum level of function.       Plan:     Patient to be seen 3 x/week to address the above listed problems via self-care/home management, therapeutic activities, therapeutic exercises  Plan of Care Expires: 06/28/23  Plan of Care Reviewed with: patient    Subjective     Chief Complaint: none stated  Patient/Family Comments/goals: none stated    Occupational Profile:  Living Environment: unclear, says she lives in a house with my sister  Previous level of function: unknown  Roles and Routines: unknown  Equipment Used at Home:  (unknown, pt reports none)  Assistance upon Discharge: unknown    Pain/Comfort:  Pain Rating 1: 0/10    Patients cultural, spiritual, [employer] conflicts given the current situation:      Objective:     Communicated with: cat prior to session.  Patient found supine with peripheral IV (sitter) upon OT entry to room.    General Precautions: Standard, fall  Orthopedic Precautions:    Braces:    Respiratory Status: Room air  Vital Signs:     Occupational Performance:    Bed Mobility:    Sup to sit  with SBA    Functional Mobility/Transfers:  Sit to stand with mod assist and RW  Functional Mobility: ambulated in room with Rw and min/CGA    Activities of Daily Living:  Able to doff and chaparrita socks seated EOb    Cognitive/Visual Perceptual:  Oriented to name only.     Physical Exam:  Wf BUE's     Therapeutic Positioning  Risk for acquired pressure injuries is decreased due to ability to get to BSC/toilet with assist.    OT interventions performed during the course of today's session in an effort to prevent and/or reduce acquired pressure injuries:   Education on Pressure Ulcer Prevention provided    Skin assessment: visible skin intact   OT recommendations for therapeutic positioning throughout hospitalization:   Follow [facility] Pressure Injury Prevention Protocol      [facility] 6 Click ADL:  [facility] Total Score:      Additional Treatment:       Patient Education:  Patient provided with verbal education regarding OT role/goals/POC, fall prevention, and safety awareness.  Additional teaching is warranted.     Patient left HOB elevated with all lines intact, call button in reach, and sitter present    GOALS:   Multidisciplinary Problems       Occupational Therapy Goals          Problem: Occupational Therapy    Goal Priority Disciplines Outcome Interventions   Occupational Therapy Goal     OT, PT/OT Ongoing, Progressing    Description: Goals to be met by: 6/28/23     Patient will increase functional independence with ADLs by performing:    UE Dressing with Stand-by Assistance.  LE Dressing with Stand-by Assistance.  Grooming while standing with Stand-by Assistance.  Toileting from toilet with Stand-by Assistance for hygiene and clothing management.   Toilet transfer to toilet with Stand-by Assistance.                         History:     Past Medical History:   Diagnosis Date    Cellulitis     Hypertension          Past Surgical History:   Procedure Laterality Date    HYSTERECTOMY         Time Tracking:     OT Date of  Treatment: 05/31/23  OT Start Time: 1058  OT Stop Time: 1107  OT Total Time (min): 9 min    Billable Minutes:Evaluation mod complexity 9 min    5/31/2023

## 2023-06-01 LAB — POCT GLUCOSE: 118 MG/DL (ref 70–110)

## 2023-06-01 PROCEDURE — 25000003 PHARM REV CODE 250: Performed by: INTERNAL MEDICINE

## 2023-06-01 PROCEDURE — 25000003 PHARM REV CODE 250: Performed by: NURSE PRACTITIONER

## 2023-06-01 PROCEDURE — 63600175 PHARM REV CODE 636 W HCPCS: Performed by: NURSE PRACTITIONER

## 2023-06-01 PROCEDURE — 97535 SELF CARE MNGMENT TRAINING: CPT

## 2023-06-01 PROCEDURE — 97530 THERAPEUTIC ACTIVITIES: CPT | Mod: CQ

## 2023-06-01 PROCEDURE — 21400001 HC TELEMETRY ROOM

## 2023-06-01 PROCEDURE — 97116 GAIT TRAINING THERAPY: CPT | Mod: CQ

## 2023-06-01 PROCEDURE — 25000003 PHARM REV CODE 250: Performed by: PHYSICIAN ASSISTANT

## 2023-06-01 RX ORDER — NIFEDIPINE 60 MG/1
60 TABLET, EXTENDED RELEASE ORAL DAILY
Status: DISCONTINUED | OUTPATIENT
Start: 2023-06-01 | End: 2023-06-02

## 2023-06-01 RX ADMIN — CEFTRIAXONE SODIUM 1000 MG: 1 INJECTION, POWDER, FOR SOLUTION INTRAMUSCULAR; INTRAVENOUS at 06:06

## 2023-06-01 RX ADMIN — DICLOFENAC SODIUM 2 G: 10 GEL TOPICAL at 09:06

## 2023-06-01 RX ADMIN — ACETAMINOPHEN 650 MG: 325 TABLET ORAL at 11:06

## 2023-06-01 RX ADMIN — HYDRALAZINE HYDROCHLORIDE 100 MG: 50 TABLET, FILM COATED ORAL at 01:06

## 2023-06-01 RX ADMIN — NIFEDIPINE 60 MG: 60 TABLET, FILM COATED, EXTENDED RELEASE ORAL at 09:06

## 2023-06-01 RX ADMIN — RIVASTIGMINE TARTRATE 3 MG: 1.5 CAPSULE ORAL at 09:06

## 2023-06-01 RX ADMIN — QUETIAPINE FUMARATE 50 MG: 25 TABLET ORAL at 08:06

## 2023-06-01 RX ADMIN — DICLOFENAC SODIUM 2 G: 10 GEL TOPICAL at 01:06

## 2023-06-01 RX ADMIN — HYDRALAZINE HYDROCHLORIDE 100 MG: 50 TABLET, FILM COATED ORAL at 05:06

## 2023-06-01 RX ADMIN — LIDOCAINE HYDROCHLORIDE 36 MG: 10 INJECTION, SOLUTION EPIDURAL; INFILTRATION; INTRACAUDAL; PERINEURAL at 06:06

## 2023-06-01 RX ADMIN — ATORVASTATIN CALCIUM 40 MG: 40 TABLET, FILM COATED ORAL at 09:06

## 2023-06-01 RX ADMIN — HYDRALAZINE HYDROCHLORIDE 100 MG: 50 TABLET, FILM COATED ORAL at 08:06

## 2023-06-01 RX ADMIN — LATANOPROST 1 DROP: 50 SOLUTION OPHTHALMIC at 08:06

## 2023-06-01 RX ADMIN — RIVASTIGMINE TARTRATE 3 MG: 1.5 CAPSULE ORAL at 08:06

## 2023-06-01 NOTE — PLAN OF CARE
Christopher sent available clinicals to [facility] Phone: [phone]. Christopher also asked for any updates regarding patient acceptance or denial for group home placement.

## 2023-06-01 NOTE — PROGRESS NOTES
[facility]  Hospital Medicine Progress Note        A. History:   History is very limited given patient is a poor historian.  Most of the information was gathered from hospital chart, medical records and ED staff  This is a 77-year-old female with a known past medical history of essential hypertension, hyperlipidemia, dementia who was brought in last night on 05/30/2023 where EMS per police for altered mental status.  Patient was found wandering around in a neighborhood.  The police was able to get her back to her home but it was locked and she was unable to get in.  Please also noted bruising and swelling of her right eye.  She reported to police she fell Sunday.  Today on our questioning she said she fell yesterday.  To the ED the she told them she fell a month ago.  Currently she is denying any complaints.  She said off and on she hurts.  She mentioned that she was going to drop her mom off to the , pleasantly confused.  She asked if she can stay in the hospital.  Informed her we are admitting her and treating her urinary tract infection and will take care of her.  She smiled and closed her eyes  Reviewed patient's labs, she has a mild SUSANNA with creatinine of 1.4, when compared to her last labs in October 2022 when creatinine was normal  On CT of her head, it shows minimal scalp soft tissue swelling along the left parietal bone consistent with scalp contusion.  Focal lytic lesion in the left sphenoid wing  CT maxillofacial shows moderate swelling in the right periorbital and norberto septal space.  There is internal hyperdensity within the swelling which is consistent with hematoma.  No underlying bony injury seen  Ordered XR metastatic survey no urinalysis did not show proteinuria  UA does shows UTI, urine cultures in process        Patient currently being managed for acute on chronic encephalopathy, acute UTI, right periorbital edema with hematoma.    Overnight ophthalmology  evaluation was conducted - nothing to do to follow-up with the outpatient ophthalmologist   X-ray metastatic survey was negative   Urine cultures growing Gram-negative rods      Today's information  Patient seen and examined at bedside, sitter at bedside  Blood pressure still elevated adjust BP meds   Urine cultures growing Gram-negative blanco continue IV ceftriaxone day 2 of 3        B. Physical exam:  Vitals reviewed.  General:  Right periorbital edema, eyes is closed.  Black eye, Pleasantly confused, awake   Chest: Clear to auscultation bilaterally anteriorly.    Heart: S1, S2, no appreciable murmur  Abdomen: Soft, nontender, BS +, abdominal binder on  MSK: Warm, 1+ pitting edema bilateral lower extremity, positive ankle swelling as well bilaterally  Neurologic:  Awake but unable to assess orientation.  Unable to answer most of the questions.  Able to move all 4 extremities        C. Medical decision making:  Acute metabolic encephalopathy vs chronic with advancing dementia   GNR UTI - POA,   Essential hypertension, uncontrolled  Right periorbital swelling/hematoma  SUSANNA, resolved   1 Focal lytic lesion in the left sphenoid wing  Hyperlipidemia  Dementia with behavioral disturbances versus delirium       Plan  Urine cultures growing Gram-negative blanco continue IV ceftriaxone day 2 of 3   Continue IV Rocephin day 2 of 3, follow-up urine cultures till complete   Appreciate ophthalmology input to follow-up outpatient, nothing to do at this time   Continue 1 on 1 sitter  continue Seroquel at bedtime 50 mg   PRN iv hydralazine for systolic blood pressure greater than 160  Continue appropriate home medications once med rec updated,   Noted in the ED, Case Management has contacted EPS as well, probable a placement, could be a difficult placement if family is un reachable/financial information etc.  PT OT consulted to evaluate for strength and weaknesses        VTE Prophylaxis:    SCD, will avoid chemical prophylaxis given  hematoma right periorbital region     Patient condition:   Fair     Discharge Planning and Disposition/Anticipated discharge:  For nursing home halfway        Smoking status unknown as pt is unable to answer the question     Advanced directives were not discussed given pt mental status and no family is reachable                VITAL SIGNS: 24 HRS MIN & MAX LAST   Temp  Min: 97.7 °F (36.5 °C)  Max: 98.4 °F (36.9 °C) 98.4 °F (36.9 °C)   BP  Min: 152/62  Max: 195/71 (!) 152/62   Pulse  Min: 57  Max: 72  72   Resp  Min: 16  Max: 20 18   SpO2  Min: 97 %  Max: 99 % 98 %     I have reviewed the following labs:    Recent Labs   Lab 05/30/23  0357 05/31/23  1341   WBC 5.95 4.03*   RBC 3.48* 3.74*   HGB 11.0* 12.0   HCT 34.0* 35.7*   MCV 97.7* 95.5*   MCH 31.6* 32.1*   MCHC 32.4* 33.6   RDW 13.3 13.6    233   MPV 9.4 9.0       Recent Labs   Lab 05/30/23  0357 05/31/23  1341    142   K 4.0 4.1   CO2 22* 29   BUN 41.3* 16.8   CREATININE 1.49* 0.70   CALCIUM 8.8 8.4   MG 1.90  --    ALBUMIN 3.6 3.2*   ALKPHOS 133 95   ALT 17 14   AST 23 21   BILITOT 0.3 0.2          Microbiology Results (last 7 days)       Procedure Component Value Units Date/Time    Urine culture [[id number]]  (Abnormal) Collected: 05/30/23 0346    Order Status: Completed Specimen: Urine Updated: 06/01/23 0743     Urine Culture >/= 100,000 colonies/ml Gram-negative Rods             See below for Radiology    Scheduled Med:   atorvastatin  40 mg Oral Daily    cefTRIAXone  1,000 mg Intramuscular Q24H    diclofenac sodium  2 g Topical (Top) QID    hydrALAZINE  100 mg Oral Q8H    latanoprost  1 drop Both Eyes QHS    LIDOcaine (PF) 10 mg/ml (1%)  3.6 mL Intramuscular Daily    NIFEdipine  60 mg Oral Daily    QUEtiapine  50 mg Oral QHS    rivastigmine tartrate  3 mg Oral BID        Continuous Infusions:       PRN Meds:  acetaminophen, acetaminophen, cloNIDine, dextrose 10%, dextrose 10%, glucagon (human recombinant), glucose, glucose, haloperidol  lactate, hydrALAZINE, labetaloL, ondansetron       Assessment/Plan:      VTE prophylaxis:     Patient condition:  Stable/Fair/Guarded/ Serious/ Critical    Anticipated discharge and Disposition:         All diagnosis and differential diagnosis have been reviewed; assessment and plan has been documented; I have personally reviewed the labs and test results that are presently available; I have reviewed the patients medication list; I have reviewed the consulting providers response and recommendations. I have reviewed or attempted to review medical records based upon their availability    All of the patient's questions have been  addressed and answered. Patient's is agreeable to the above stated plan. I will continue to monitor closely and make adjustments to medical management as needed.  _____________________________________________________________________    Nutrition Status:    Radiology:  I have personally reviewed the following imaging and agree with the radiologist.     XR Metastatic Survey  EXAMINATION  XR METASTATIC SURVEY    CLINICAL HISTORY  1 Lytic lesion on CT, poor historiam, no family available.;    TECHNIQUE  A total of 22 images of the axial and appendicular skeletal structures were obtained for purposes of metastatic survey.    COMPARISON  None available at the time of initial interpretation.    FINDINGS  The visualized osseous structures are without convincing expansile, lytic, or sclerotic lesion. No findings to suggest focal bone disease are appreciated. The trabecular pattern is unremarkable.    There is no acute vertebral column malalignment, cortical displacement, or articular subluxation.  Regional degenerative changes are noted.  No skeletal asymmetry is appreciated.    The included soft tissues are without convincing acute abnormality.  Scattered vascular calcification is present.  No focal abnormality of the visualized intrathoracic and abdominopelvic regions is  appreciated.    IMPRESSION  1. No convincing destructive skeletal lesion by radiographic assessment.  2. Chronic secondary details discussed above.    Electronically signed by: Amilcar Stern  Date:    05/30/2023  Time:    13:42  CT Cervical Spine Without Contrast  Narrative: Technique:CT of the cervical spine was performed without intravenous contrast with axial as well as sagittal and coronal images.    Comparison:None.    Dosage Information:Automated exposure control was utilized.      Clinical history:Fall, facial injury.    Findings:    Lung apices:The visualized lung apices appear unremarkable.    Mineralization:Mild osteopenia is seen in the visualized bony structures of the cervical spine.    Rotation:Leftward rotation of C1 with respect to C2 is seen.    Scoliosis:No significant scoliosis is seen.    Vertebral Fusion:No vertebral fusion is identified.    Listhesis:No significant listhesis is identified.    Lordosis:Degenerative straightening of the cervical lordosis is seen.    Intervertebral disc spaces:Multilevel loss of disc height is seen.    Osteophytes:Multilevel endplate osteophytes are seen.    Endplate Sclerosis:Multilevel endplate sclerosis is seen.    Uncovertebral degenerative changes:Multilevel uncovertebral joint arthrosis is seen.    Facet degenerative changes:Multilevel facet degenerative changes are seen.    Fractures:No acute cervical spine fracture dislocation or subluxation is seen.    This exam does not exclude the possibility of intrathecal soft tissue, ligamentous or vascular injury.  Impression: Impression:    1. No acute cervical spine fracture dislocation or subluxation is seen.    2. Degenerative changes and other details as above.    No significant discrepancy with overnight report.    Electronically signed by: Cuco Posey  Date:    05/30/2023  Time:    07:40  CT Maxillofacial Without Contrast  Narrative: Technique:Noncontrast maxillofacial CT was performed with axial as  well as sagittal and coronal images being submitted for interpretation.    Automated exposure control was utilized to minimize radiation dose     Comparison:None.    Clinical history:Fall, facial injury.    Findings:    Orbital soft tissues:There is moderate soft tissue swelling in the right periorbital and preseptal spaces. There is internal hyperdensity within the swelling which is consistent with hematoma. This is consistent with soft tissue contusion with associated hematoma. No underlying bony injury is seen.    preseptal soft tissues:The left preseptal soft tissues appear unremarkable.    post- septal soft tissues:The bilateral extraconal soft tissues appear unremarkable. The bilateral intraconal soft tissues appear unremarkable.    Bones:    Orbital bony structures:The bilateral orbital bony structures are intact with no orbital fracture identified.    Mandible:The mandible appears unremarkable.    Maxilla:The maxilla appears unremarkable.    Pterygoid plates:No fracture identified of the right or left pterygoid plates.    Zygoma:The zygomatic arches are intact.    TMJ:The mandibular condyles appear normally placed with respect to the mandibular fossa.    Nasal Bones:The nasal septum is midline.    Skull:No acute linear or depressed fracture is identified in the visualized skull.    Paranasal sinuses:The visualized paranasal sinuses appear clear with no mucoperiosteal thickening or air fluid levels identified.    Mastoid air cells:The visualized mastoid air cells appear clear.    Brain:Intracranial findings discussed separately.  Impression: Impression:    1. There is moderate soft tissue swelling in the right periorbital and preseptal spaces. There is internal hyperdensity within the swelling which is consistent with hematoma. This is consistent with soft tissue contusion with associated hematoma. No underlying bony injury is seen.    2. No acute maxillofacial fracture identified. Details and other  findings as noted above.    No significant discrepancy with overnight report.    Electronically signed by: Cuco Posey  Date:    05/30/2023  Time:    07:37  CT Head Without Contrast  Narrative: Technique:CT of the head was performed without intravenous contrast with axial as well as coronal and sagittal images.    Comparison:None.    Dosage Information:Automated exposure control was utilized.  DLP 1337    Clinical history:Fall, facial injury.    Findings:    Hemorrhage:No acute intracranial hemorrhage is seen.    CSF spaces:The ventricles, sulci and basal cisterns all appear mildly prominent consistent with global cerebral atrophy.    Brain parenchyma:There is preservation of the grey white junction throughout. Mild to moderate microvascular change is seen in portions of the periventricular and deep white matter tracts.    Cerebellum:Unremarkable..    Calvarium:There is a focal lytic lesion in the left sphenoid wing on series 4 image 44. No acute linear or depressed skull fracture is seen.    Scalp:Minimal scalp soft tissue swelling is seen along the left parietal bone. The swelling is centered on image 20, Series 3. This consistent with a scalp contusion. No underlying bone injury is identified.    Maxillofacial Structures:Maxillofacial findings discussed separately in the maxillofacial CT report.  Impression: Impression:    1. Minimal scalp soft tissue swelling is seen along the left parietal bone. This consistent with a scalp contusion. No underlying bone injury is identified.    2. There is a focal lytic lesion in the left sphenoid wing on series 4 image 44. Correlate clinically as regards additional evaluation and follow-up.    3. No acute intracranial traumatic injury identified. Details and other findings as noted above.    No significant discrepancy with overnight report.    Electronically signed by: Cuco Posey  Date:    05/30/2023  Time:    07:35      German Boateng MD   06/01/2023

## 2023-06-01 NOTE — PLAN OF CARE
Problem: Adult Inpatient Plan of Care  Goal: Plan of Care Review  Outcome: Ongoing, Progressing  Flowsheets (Taken 6/1/2023 0147)  Plan of Care Reviewed With:   patient   daughter  Goal: Patient-Specific Goal (Individualized)  Outcome: Ongoing, Progressing  Goal: Absence of Hospital-Acquired Illness or Injury  Outcome: Ongoing, Progressing  Intervention: Identify and Manage Fall Risk  Flowsheets (Taken 6/1/2023 0147)  Safety Promotion/Fall Prevention:   assistive device/personal item within reach   Fall Risk reviewed with patient/family   Fall Risk signage in place   lighting adjusted   medications reviewed   nonskid shoes/socks when out of bed   /camera at bedside   side rails raised x 2  Intervention: Prevent Skin Injury  Flowsheets (Taken 6/1/2023 0147)  Body Position:   supine   upper extremity elevated  Skin Protection:   adhesive use limited   tubing/devices free from skin contact   protective footwear used  Intervention: Prevent and Manage VTE (Venous Thromboembolism) Risk  Flowsheets (Taken 6/1/2023 0147)  Activity Management: Sitting at edge of bed - L2  VTE Prevention/Management:   bleeding precations maintained   bleeding risk assessed   ambulation promoted  Intervention: Prevent Infection  Flowsheets (Taken 6/1/2023 0147)  Infection Prevention:   environmental surveillance performed   equipment surfaces disinfected   hand hygiene promoted   personal protective equipment utilized   rest/sleep promoted   single patient room provided  Goal: Optimal Comfort and Wellbeing  Outcome: Ongoing, Progressing  Intervention: Monitor Pain and Promote Comfort  Flowsheets (Taken 6/1/2023 0147)  Pain Management Interventions:   care clustered   quiet environment facilitated  Intervention: Provide Person-Centered Care  Flowsheets (Taken 6/1/2023 0147)  Trust Relationship/Rapport:   care explained   choices provided   emotional support provided   empathic listening provided   thoughts/feelings  acknowledged   questions answered  Goal: Readiness for Transition of Care  Outcome: Ongoing, Progressing     Problem: Skin Injury Risk Increased  Goal: Skin Health and Integrity  Outcome: Ongoing, Progressing  Intervention: Optimize Skin Protection  Flowsheets (Taken 6/1/2023 0147)  Pressure Reduction Techniques: frequent weight shift encouraged  Skin Protection:   adhesive use limited   tubing/devices free from skin contact   protective footwear used  Head of Bed (HOB) Positioning: HOB at 20-30 degrees     Problem: Impaired Wound Healing  Goal: Optimal Wound Healing  Outcome: Ongoing, Progressing  Intervention: Promote Wound Healing  Flowsheets (Taken 6/1/2023 0147)  Sleep/Rest Enhancement:   awakenings minimized   room darkened   regular sleep/rest pattern promoted  Activity Management: Sitting at edge of bed - L2  Pain Management Interventions:   care clustered   quiet environment facilitated

## 2023-06-01 NOTE — PT/OT/SLP PROGRESS
Physical Therapy Treatment    Patient Name:  Kathleen Arevalo   MRN:  [id number]    Recommendations:     Discharge Recommendations: nursing facility, basic  Discharge Equipment Recommendations: to be determined by next level of care  Barriers to discharge: Decreased caregiver support and impaired cognition    Assessment:     Kathleen Arevalo is a 77 y.o. female admitted with a medical diagnosis of  AMS. Per chart, patient was found by police wandering around neighborhood. Right eye swollen from fall that occurred on Sunday. She has history of dementia. She presents with the following impairments/functional limitations: weakness, gait instability, impaired balance, decreased safety awareness .  Pt required increased time for all activities and heavy vcs to stay on task.     Rehab Prognosis: Good; patient would benefit from acute skilled PT services to address these deficits and reach maximum level of function.    Recent Surgery: * No surgery found *      Plan:     During this hospitalization, patient to be seen 3 x/week to address the identified rehab impairments via gait training, therapeutic activities, therapeutic exercises, neuromuscular re-education and progress toward the following goals:    Plan of Care Expires:  06/30/23    Subjective     Chief Complaint:   Patient/Family Comments/goals:   Pain/Comfort:  Pain Rating 1: 0/10      Objective:     Communicated with NSG prior to session.  Patient found sitting edge of bed with 1:1 upon PT entry to room.     General Precautions: Standard, fall  Orthopedic Precautions: N/A  Braces: N/A  Respiratory Status: Room air  Blood Pressure:   Skin Integrity: Visible skin intact      Functional Mobility:  Transfers:     Sit to Stand:  contact guard assistance with rolling walker and from EOB and toilet  Toilet Transfer: minimum assistance with  rolling walker  using  Step Transfer and . Pt T/F EOB<>toilet. Pt urinated on floor during T/F.   Gait: pt amb 12ft 2x with RW [place]. Pt required  vcs to stay close to RW to ensure safety.   Dyn sitting: pt able to assist with wiping BL legs after urinating onto floor while sitting on toilet.       Patient left sitting edge of bed with call button in reach and 1:1 present..    GOALS:   Multidisciplinary Problems       Physical Therapy Goals          Problem: Physical Therapy    Goal Priority Disciplines Outcome Goal Variances Interventions   Physical Therapy Goal     PT, PT/OT Ongoing, Progressing     Description: Goals to be met by: 23     Patient will increase functional independence with mobility by performin. Sit to stand transfer with Modified [place]  2. Gait  x 300 feet with Modified [place] using Rolling Walker.                          Time Tracking:     PT Received On: 23  PT Start Time: 08     PT Stop Time: 0849  PT Total Time (min): 29 min     Billable Minutes: Gait Training 15 and Therapeutic Activity 14    Treatment Type: Treatment  PT/PTA: PTA     Number of PTA visits since last PT visit: 2023

## 2023-06-01 NOTE — PT/OT/SLP PROGRESS
Occupational Therapy   Treatment    Name: Kathleen Arevalo  MRN: [id number]  Admitting Diagnosis:  <principal problem not specified>       Recommendations:     Discharge Recommendations: nursing facility, skilled, nursing facility, basic  Discharge Equipment Recommendations:   (tbd)  Barriers to discharge:  Decreased caregiver support    Assessment:     Kathleen Arevalo is a 77 y.o. female with a medical diagnosis of AMS.  She presents pleasantly confused, with poor memory, recall and insight, able to ambulate to BR with cues and CGA, . Performance deficits affecting function are weakness, impaired endurance, impaired self care skills, impaired functional mobility, impaired cognition, decreased safety awareness.     Rehab Prognosis:  Good; patient would benefit from acute skilled OT services to address these deficits and reach maximum level of function.       Plan:     Patient to be seen 3 x/week to address the above listed problems via self-care/home management, therapeutic activities, therapeutic exercises  Plan of Care Expires: 06/28/23  Plan of Care Reviewed with: patient    Subjective     Pain/Comfort:  Pain Rating 1: 0/10    Objective:     Communicated with: nsg and PtA prior to session.  Patient found sitting edge of bed with peripheral IV (sitter) upon OT entry to room.    General Precautions: Standard, fall    Orthopedic Precautions:   Braces:    Respiratory Status: Room air  Vital Signs:      Occupational Performance:     Bed Mobility:         Functional Mobility/Transfers:  Sit to stand with CGA  Functional Mobility: ambulated to BR with CGA/cues, toilet transfer min/CGA with GB's and cues for safety    Activities of Daily Living:  UB dress with cues/setup assist  Donned underwear seated with min assist and min assist standing to pull up  Toilet hygiene with SBA and cues  Pt urinated on the floor randomly, unaware of any urinary urgency    Therapeutic Activities:  As above     Therapeutic Exercise:      [facility] 6 Click  ADL:      Patient Education:  Patient provided with verbal education regarding OT role/goals/POC, fall prevention, and safety awareness.  Additional teaching is warranted.      Patient left sitting edge of bed with call button in reach and sitter present    GOALS:   Multidisciplinary Problems       Occupational Therapy Goals          Problem: Occupational Therapy    Goal Priority Disciplines Outcome Interventions   Occupational Therapy Goal     OT, PT/OT Ongoing, Progressing    Description: Goals to be met by: 6/28/23     Patient will increase functional independence with ADLs by performing:    UE Dressing with Stand-by Assistance.  LE Dressing with Stand-by Assistance.  Grooming while standing with Stand-by Assistance.  Toileting from toilet with Stand-by Assistance for hygiene and clothing management.   Toilet transfer to toilet with Stand-by Assistance.                         Time Tracking:     OT Date of Treatment: 06/01/23  OT Start Time: 0824  OT Stop Time: 0850  OT Total Time (min): 26 min    Billable Minutes:Self Care/Home Management 26 min    OT/MEHDI: OT     Number of MEHDI visits since last OT visit: 1    6/1/2023

## 2023-06-02 LAB — BACTERIA UR CULT: ABNORMAL

## 2023-06-02 PROCEDURE — 25000003 PHARM REV CODE 250: Performed by: INTERNAL MEDICINE

## 2023-06-02 PROCEDURE — 21400001 HC TELEMETRY ROOM

## 2023-06-02 PROCEDURE — 63600175 PHARM REV CODE 636 W HCPCS: Performed by: NURSE PRACTITIONER

## 2023-06-02 PROCEDURE — 25000003 PHARM REV CODE 250: Performed by: NURSE PRACTITIONER

## 2023-06-02 RX ORDER — QUETIAPINE FUMARATE 25 MG/1
25 TABLET, FILM COATED ORAL DAILY
Status: DISCONTINUED | OUTPATIENT
Start: 2023-06-02 | End: 2023-06-06 | Stop reason: HOSPADM

## 2023-06-02 RX ORDER — NIFEDIPINE 30 MG/1
30 TABLET, EXTENDED RELEASE ORAL DAILY
Status: DISCONTINUED | OUTPATIENT
Start: 2023-06-02 | End: 2023-06-02

## 2023-06-02 RX ADMIN — CEFTRIAXONE SODIUM 1000 MG: 1 INJECTION, POWDER, FOR SOLUTION INTRAMUSCULAR; INTRAVENOUS at 07:06

## 2023-06-02 RX ADMIN — HYDRALAZINE HYDROCHLORIDE 100 MG: 50 TABLET, FILM COATED ORAL at 06:06

## 2023-06-02 RX ADMIN — QUETIAPINE FUMARATE 50 MG: 25 TABLET ORAL at 08:06

## 2023-06-02 RX ADMIN — QUETIAPINE FUMARATE 25 MG: 25 TABLET ORAL at 09:06

## 2023-06-02 RX ADMIN — LATANOPROST 1 DROP: 50 SOLUTION OPHTHALMIC at 08:06

## 2023-06-02 RX ADMIN — ATORVASTATIN CALCIUM 40 MG: 40 TABLET, FILM COATED ORAL at 09:06

## 2023-06-02 RX ADMIN — RIVASTIGMINE TARTRATE 3 MG: 1.5 CAPSULE ORAL at 08:06

## 2023-06-02 RX ADMIN — RIVASTIGMINE TARTRATE 3 MG: 1.5 CAPSULE ORAL at 09:06

## 2023-06-02 RX ADMIN — DICLOFENAC SODIUM 2 G: 10 GEL TOPICAL at 08:06

## 2023-06-02 RX ADMIN — HYDRALAZINE HYDROCHLORIDE 100 MG: 50 TABLET, FILM COATED ORAL at 08:06

## 2023-06-02 RX ADMIN — DICLOFENAC SODIUM 2 G: 10 GEL TOPICAL at 04:06

## 2023-06-02 RX ADMIN — LIDOCAINE HYDROCHLORIDE 36 MG: 10 INJECTION, SOLUTION EPIDURAL; INFILTRATION; INTRACAUDAL; PERINEURAL at 07:06

## 2023-06-02 RX ADMIN — DICLOFENAC SODIUM 2 G: 10 GEL TOPICAL at 09:06

## 2023-06-02 NOTE — PROGRESS NOTES
Date of Service: 10/6/22  Attending Attestation: Patient discussed with resident. The chart was reviewed thoroughly including pertinent vitals, labs, imaging, prior notes, and consultant/specialist recommendations.  I participated in the management of the patient, examined the patient, reviewed the summary of the plan, and was immediately available at all times throughout the encounter. Services were furnished in a primary care center located in the outpatient department of a [facility] hospital. I agree with the resident's findings and plan as documented in the resident's note.    Sachi Mohamud MD  Attending - Family Medicine / Geriatric Medicine  [facility] [place], [facility]

## 2023-06-02 NOTE — PLAN OF CARE
Problem: Adult Inpatient Plan of Care  Goal: Plan of Care Review  Outcome: Ongoing, Progressing  Flowsheets (Taken 6/2/2023 0130)  Plan of Care Reviewed With:   patient   spouse  Goal: Patient-Specific Goal (Individualized)  Outcome: Ongoing, Progressing  Goal: Absence of Hospital-Acquired Illness or Injury  Outcome: Ongoing, Progressing  Goal: Optimal Comfort and Wellbeing  Outcome: Ongoing, Progressing  Goal: Readiness for Transition of Care  Outcome: Ongoing, Progressing     Problem: Skin Injury Risk Increased  Goal: Skin Health and Integrity  Outcome: Ongoing, Progressing     Problem: Impaired Wound Healing  Goal: Optimal Wound Healing  Outcome: Ongoing, Progressing

## 2023-06-02 NOTE — PLAN OF CARE
Christopher received a call from Lizy with [facility]. Lizy noted they will not have a secure unit bed available until next week Tuesday. CHRISTOPHER verbalized understanding and reported that patient was currently on a 1:1 and we will try to get her off 24 hours prior to next weeks Tuesday.

## 2023-06-02 NOTE — PROGRESS NOTES
[facility]  Hospital Medicine Progress Note      A. History:   History is very limited given patient is a poor historian.  Most of the information was gathered from hospital chart, medical records and ED staff  This is a 77-year-old female with a known past medical history of essential hypertension, hyperlipidemia, dementia who was brought in last night on 05/30/2023 where EMS per police for altered mental status.  Patient was found wandering around in a neighborhood.  The police was able to get her back to her home but it was locked and she was unable to get in.  Please also noted bruising and swelling of her right eye.  She reported to police she fell Sunday.  Today on our questioning she said she fell yesterday.  To the ED the she told them she fell a month ago.  Currently she is denying any complaints.  She said off and on she hurts.  She mentioned that she was going to drop her mom off to the , pleasantly confused.  She asked if she can stay in the hospital.  Informed her we are admitting her and treating her urinary tract infection and will take care of her.  She smiled and closed her eyes  Reviewed patient's labs, she has a mild SUSANNA with creatinine of 1.4, when compared to her last labs in October 2022 when creatinine was normal  On CT of her head, it shows minimal scalp soft tissue swelling along the left parietal bone consistent with scalp contusion.  Focal lytic lesion in the left sphenoid wing  CT maxillofacial shows moderate swelling in the right periorbital and norberto septal space.  There is internal hyperdensity within the swelling which is consistent with hematoma.  No underlying bony injury seen  Ordered XR metastatic survey no urinalysis did not show proteinuria  UA does shows UTI, urine cultures in process        Patient currently being managed for acute on chronic encephalopathy, acute UTI, right periorbital edema with hematoma.    Overnight ophthalmology  evaluation was conducted - nothing to do to follow-up with the outpatient ophthalmologist   X-ray metastatic survey was negative   Urine cultures growing e coli      Today's information  Patient seen and examined at bedside, sitter at bedside  Blood pressure still elevated adjust BP meds   Urine cultures growing E coli on IV ceftriaxone.    Continue 1 on 1 sitter   Add on Seroquel 25 mg q.a.m.  Blood pressure is getting too controlled will discontinue nifedipine         B. Physical exam:  Vitals reviewed.  General:  Right periorbital edema, eyes is closed.  Black eye, Pleasantly confused, awake   Chest: Clear to auscultation bilaterally anteriorly.    Heart: S1, S2, no appreciable murmur  Abdomen: Soft, nontender, BS +, abdominal binder on  MSK: Warm, 1+ pitting edema bilateral lower extremity, positive ankle swelling as well bilaterally  Neurologic:  Awake but unable to assess orientation.  Unable to answer most of the questions.  Able to move all 4 extremities        C. Medical decision making:  Acute metabolic encephalopathy vs chronic with advancing dementia   E coli UTI - POA,   Essential hypertension, controlled  Right periorbital swelling/hematoma  SUSANNA, resolved   1 Focal lytic lesion in the left sphenoid wing  Hyperlipidemia  Dementia with behavioral disturbances versus delirium        Plan  Urine cultures growing E coli on IV ceftriaxone.  Day 3/5 ; given ams on admit will treat for 5 days   Continue 1 on 1 sitter   Add on Seroquel 25 mg q.a.m.continue Seroquel at bedtime 50 mg   Blood pressure is getting too controlled will discontinue nifedipine   Appreciate ophthalmology input to follow-up outpatient, nothing to do at this time   PRN iv hydralazine for systolic blood pressure greater than 160  Continue appropriate home medications once med rec updated,   Noted in the ED, Case Management has contacted EPS as well, probable a placement, could be a difficult placement if family is un reachable/financial  information etc.  PT OT consulted to evaluate for strength and weaknesses        VTE Prophylaxis:    SCD, will avoid chemical prophylaxis given hematoma right periorbital region     Patient condition:   Fair     Discharge Planning and Disposition/Anticipated discharge:  For nursing home skilled nursing        Smoking status unknown as pt is unable to answer the question     Advanced directives were not discussed given pt mental status and no family is reachable                  VITAL SIGNS: 24 HRS MIN & MAX LAST   Temp  Min: 97.9 °F (36.6 °C)  Max: 98.6 °F (37 °C) 97.9 °F (36.6 °C)   BP  Min: 108/73  Max: 163/69 (!) 119/56   Pulse  Min: 70  Max: 93  93   Resp  Min: 18  Max: 20 19   SpO2  Min: 97 %  Max: 100 % 98 %     I have reviewed the following labs:    Recent Labs   Lab 05/30/23  0357 05/31/23  1341   WBC 5.95 4.03*   RBC 3.48* 3.74*   HGB 11.0* 12.0   HCT 34.0* 35.7*   MCV 97.7* 95.5*   MCH 31.6* 32.1*   MCHC 32.4* 33.6   RDW 13.3 13.6    233   MPV 9.4 9.0       Recent Labs   Lab 05/30/23  0357 05/31/23  1341    142   K 4.0 4.1   CO2 22* 29   BUN 41.3* 16.8   CREATININE 1.49* 0.70   CALCIUM 8.8 8.4   MG 1.90  --    ALBUMIN 3.6 3.2*   ALKPHOS 133 95   ALT 17 14   AST 23 21   BILITOT 0.3 0.2          Microbiology Results (last 7 days)       Procedure Component Value Units Date/Time    Urine culture [[id number]]  (Abnormal)  (Susceptibility) Collected: 05/30/23 0346    Order Status: Completed Specimen: Urine Updated: 06/02/23 0659     Urine Culture >/= 100,000 colonies/ml Escherichia coli             See below for Radiology    Scheduled Med:   atorvastatin  40 mg Oral Daily    diclofenac sodium  2 g Topical (Top) QID    hydrALAZINE  100 mg Oral Q8H    latanoprost  1 drop Both Eyes QHS    LIDOcaine (PF) 10 mg/ml (1%)  3.6 mL Intramuscular Daily    QUEtiapine  25 mg Oral Daily    QUEtiapine  50 mg Oral QHS    rivastigmine tartrate  3 mg Oral BID        Continuous Infusions:       PRN Meds:  acetaminophen,  acetaminophen, cloNIDine, dextrose 10%, dextrose 10%, glucagon (human recombinant), glucose, glucose, haloperidol lactate, hydrALAZINE, labetaloL, ondansetron       Assessment/Plan:      VTE prophylaxis:     Patient condition:  Stable/Fair/Guarded/ Serious/ Critical    Anticipated discharge and Disposition:         All diagnosis and differential diagnosis have been reviewed; assessment and plan has been documented; I have personally reviewed the labs and test results that are presently available; I have reviewed the patients medication list; I have reviewed the consulting providers response and recommendations. I have reviewed or attempted to review medical records based upon their availability    All of the patient's questions have been  addressed and answered. Patient's is agreeable to the above stated plan. I will continue to monitor closely and make adjustments to medical management as needed.  _____________________________________________________________________    Nutrition Status:    Radiology:  I have personally reviewed the following imaging and agree with the radiologist.     XR Metastatic Survey  EXAMINATION  XR METASTATIC SURVEY    CLINICAL HISTORY  1 Lytic lesion on CT, poor historiam, no family available.;    TECHNIQUE  A total of 22 images of the axial and appendicular skeletal structures were obtained for purposes of metastatic survey.    COMPARISON  None available at the time of initial interpretation.    FINDINGS  The visualized osseous structures are without convincing expansile, lytic, or sclerotic lesion. No findings to suggest focal bone disease are appreciated. The trabecular pattern is unremarkable.    There is no acute vertebral column malalignment, cortical displacement, or articular subluxation.  Regional degenerative changes are noted.  No skeletal asymmetry is appreciated.    The included soft tissues are without convincing acute abnormality.  Scattered vascular calcification is present.   No focal abnormality of the visualized intrathoracic and abdominopelvic regions is appreciated.    IMPRESSION  1. No convincing destructive skeletal lesion by radiographic assessment.  2. Chronic secondary details discussed above.    Electronically signed by: Amilcar Stern  Date:    05/30/2023  Time:    13:42  CT Cervical Spine Without Contrast  Narrative: Technique:CT of the cervical spine was performed without intravenous contrast with axial as well as sagittal and coronal images.    Comparison:None.    Dosage Information:Automated exposure control was utilized.      Clinical history:Fall, facial injury.    Findings:    Lung apices:The visualized lung apices appear unremarkable.    Mineralization:Mild osteopenia is seen in the visualized bony structures of the cervical spine.    Rotation:Leftward rotation of C1 with respect to C2 is seen.    Scoliosis:No significant scoliosis is seen.    Vertebral Fusion:No vertebral fusion is identified.    Listhesis:No significant listhesis is identified.    Lordosis:Degenerative straightening of the cervical lordosis is seen.    Intervertebral disc spaces:Multilevel loss of disc height is seen.    Osteophytes:Multilevel endplate osteophytes are seen.    Endplate Sclerosis:Multilevel endplate sclerosis is seen.    Uncovertebral degenerative changes:Multilevel uncovertebral joint arthrosis is seen.    Facet degenerative changes:Multilevel facet degenerative changes are seen.    Fractures:No acute cervical spine fracture dislocation or subluxation is seen.    This exam does not exclude the possibility of intrathecal soft tissue, ligamentous or vascular injury.  Impression: Impression:    1. No acute cervical spine fracture dislocation or subluxation is seen.    2. Degenerative changes and other details as above.    No significant discrepancy with overnight report.    Electronically signed by: Cuco Posey  Date:    05/30/2023  Time:    07:40  CT Maxillofacial Without  Contrast  Narrative: Technique:Noncontrast maxillofacial CT was performed with axial as well as sagittal and coronal images being submitted for interpretation.    Automated exposure control was utilized to minimize radiation dose     Comparison:None.    Clinical history:Fall, facial injury.    Findings:    Orbital soft tissues:There is moderate soft tissue swelling in the right periorbital and preseptal spaces. There is internal hyperdensity within the swelling which is consistent with hematoma. This is consistent with soft tissue contusion with associated hematoma. No underlying bony injury is seen.    preseptal soft tissues:The left preseptal soft tissues appear unremarkable.    post- septal soft tissues:The bilateral extraconal soft tissues appear unremarkable. The bilateral intraconal soft tissues appear unremarkable.    Bones:    Orbital bony structures:The bilateral orbital bony structures are intact with no orbital fracture identified.    Mandible:The mandible appears unremarkable.    Maxilla:The maxilla appears unremarkable.    Pterygoid plates:No fracture identified of the right or left pterygoid plates.    Zygoma:The zygomatic arches are intact.    TMJ:The mandibular condyles appear normally placed with respect to the mandibular fossa.    Nasal Bones:The nasal septum is midline.    Skull:No acute linear or depressed fracture is identified in the visualized skull.    Paranasal sinuses:The visualized paranasal sinuses appear clear with no mucoperiosteal thickening or air fluid levels identified.    Mastoid air cells:The visualized mastoid air cells appear clear.    Brain:Intracranial findings discussed separately.  Impression: Impression:    1. There is moderate soft tissue swelling in the right periorbital and preseptal spaces. There is internal hyperdensity within the swelling which is consistent with hematoma. This is consistent with soft tissue contusion with associated hematoma. No underlying  bony injury is seen.    2. No acute maxillofacial fracture identified. Details and other findings as noted above.    No significant discrepancy with overnight report.    Electronically signed by: Cuco Posey  Date:    05/30/2023  Time:    07:37  CT Head Without Contrast  Narrative: Technique:CT of the head was performed without intravenous contrast with axial as well as coronal and sagittal images.    Comparison:None.    Dosage Information:Automated exposure control was utilized.  DLP 1337    Clinical history:Fall, facial injury.    Findings:    Hemorrhage:No acute intracranial hemorrhage is seen.    CSF spaces:The ventricles, sulci and basal cisterns all appear mildly prominent consistent with global cerebral atrophy.    Brain parenchyma:There is preservation of the grey white junction throughout. Mild to moderate microvascular change is seen in portions of the periventricular and deep white matter tracts.    Cerebellum:Unremarkable..    Calvarium:There is a focal lytic lesion in the left sphenoid wing on series 4 image 44. No acute linear or depressed skull fracture is seen.    Scalp:Minimal scalp soft tissue swelling is seen along the left parietal bone. The swelling is centered on image 20, Series 3. This consistent with a scalp contusion. No underlying bone injury is identified.    Maxillofacial Structures:Maxillofacial findings discussed separately in the maxillofacial CT report.  Impression: Impression:    1. Minimal scalp soft tissue swelling is seen along the left parietal bone. This consistent with a scalp contusion. No underlying bone injury is identified.    2. There is a focal lytic lesion in the left sphenoid wing on series 4 image 44. Correlate clinically as regards additional evaluation and follow-up.    3. No acute intracranial traumatic injury identified. Details and other findings as noted above.    No significant discrepancy with overnight report.    Electronically signed by: Cuco  Kalpesh  Date:    05/30/2023  Time:    07:35      German Boateng MD   06/02/2023

## 2023-06-03 PROCEDURE — 25000003 PHARM REV CODE 250: Performed by: INTERNAL MEDICINE

## 2023-06-03 PROCEDURE — 63600175 PHARM REV CODE 636 W HCPCS: Performed by: NURSE PRACTITIONER

## 2023-06-03 PROCEDURE — 25000003 PHARM REV CODE 250: Performed by: PHYSICIAN ASSISTANT

## 2023-06-03 PROCEDURE — 21400001 HC TELEMETRY ROOM

## 2023-06-03 PROCEDURE — 25000003 PHARM REV CODE 250: Performed by: NURSE PRACTITIONER

## 2023-06-03 RX ORDER — CEFDINIR 300 MG/1
300 CAPSULE ORAL EVERY 12 HOURS
Status: COMPLETED | OUTPATIENT
Start: 2023-06-03 | End: 2023-06-04

## 2023-06-03 RX ADMIN — QUETIAPINE FUMARATE 25 MG: 25 TABLET ORAL at 09:06

## 2023-06-03 RX ADMIN — CEFDINIR 300 MG: 300 CAPSULE ORAL at 09:06

## 2023-06-03 RX ADMIN — HYDRALAZINE HYDROCHLORIDE 100 MG: 50 TABLET, FILM COATED ORAL at 09:06

## 2023-06-03 RX ADMIN — ACETAMINOPHEN 650 MG: 325 TABLET ORAL at 01:06

## 2023-06-03 RX ADMIN — DICLOFENAC SODIUM 2 G: 10 GEL TOPICAL at 01:06

## 2023-06-03 RX ADMIN — HYDRALAZINE HYDROCHLORIDE 100 MG: 50 TABLET, FILM COATED ORAL at 01:06

## 2023-06-03 RX ADMIN — RIVASTIGMINE TARTRATE 3 MG: 1.5 CAPSULE ORAL at 09:06

## 2023-06-03 RX ADMIN — ATORVASTATIN CALCIUM 40 MG: 40 TABLET, FILM COATED ORAL at 09:06

## 2023-06-03 RX ADMIN — DICLOFENAC SODIUM 2 G: 10 GEL TOPICAL at 09:06

## 2023-06-03 RX ADMIN — DICLOFENAC SODIUM 2 G: 10 GEL TOPICAL at 05:06

## 2023-06-03 RX ADMIN — QUETIAPINE FUMARATE 50 MG: 25 TABLET ORAL at 09:06

## 2023-06-03 RX ADMIN — HALOPERIDOL LACTATE 5 MG: 5 INJECTION, SOLUTION INTRAMUSCULAR at 07:06

## 2023-06-03 RX ADMIN — LATANOPROST 1 DROP: 50 SOLUTION OPHTHALMIC at 09:06

## 2023-06-03 NOTE — PLAN OF CARE
Problem: Adult Inpatient Plan of Care  Goal: Plan of Care Review  Outcome: Ongoing, Progressing  Flowsheets (Taken 6/2/2023 2213)  Plan of Care Reviewed With: patient  Goal: Patient-Specific Goal (Individualized)  Outcome: Ongoing, Progressing  Goal: Absence of Hospital-Acquired Illness or Injury  Outcome: Ongoing, Progressing  Goal: Optimal Comfort and Wellbeing  Outcome: Ongoing, Progressing  Goal: Readiness for Transition of Care  Outcome: Ongoing, Progressing     Problem: Skin Injury Risk Increased  Goal: Skin Health and Integrity  Outcome: Ongoing, Progressing     Problem: Impaired Wound Healing  Goal: Optimal Wound Healing  Outcome: Ongoing, Progressing

## 2023-06-03 NOTE — PROGRESS NOTES
[facility]  Hospital Medicine Progress Note        A. History:   History is very limited given patient is a poor historian.  Most of the information was gathered from hospital chart, medical records and ED staff  This is a 77-year-old female with a known past medical history of essential hypertension, hyperlipidemia, dementia who was brought in last night on 05/30/2023 where EMS per police for altered mental status.  Patient was found wandering around in a neighborhood.  The police was able to get her back to her home but it was locked and she was unable to get in.  Please also noted bruising and swelling of her right eye.  She reported to police she fell Sunday.  Today on our questioning she said she fell yesterday.  To the ED the she told them she fell a month ago.  Currently she is denying any complaints.  She said off and on she hurts.  She mentioned that she was going to drop her mom off to the , pleasantly confused.  She asked if she can stay in the hospital.  Informed her we are admitting her and treating her urinary tract infection and will take care of her.  She smiled and closed her eyes  Reviewed patient's labs, she has a mild SUSANNA with creatinine of 1.4, when compared to her last labs in October 2022 when creatinine was normal  On CT of her head, it shows minimal scalp soft tissue swelling along the left parietal bone consistent with scalp contusion.  Focal lytic lesion in the left sphenoid wing  CT maxillofacial shows moderate swelling in the right periorbital and norberto septal space.  There is internal hyperdensity within the swelling which is consistent with hematoma.  No underlying bony injury seen  Ordered XR metastatic survey no urinalysis did not show proteinuria  UA does shows UTI, urine cultures in process        Patient currently being managed for acute on chronic encephalopathy, acute UTI, right periorbital edema with hematoma.    Overnight ophthalmology  evaluation was conducted - nothing to do to follow-up with the outpatient ophthalmologist   X-ray metastatic survey was negative   Urine cultures growing e coli      Today's information  Patient seen and examined at bedside, sitter at bedside  Patient has no new complaints today   Patient has been pulling off IV lines   Change to p.o. cefdinir 300 mg b.i.d. for 2 more days   Awaiting placement     B. Physical exam:  Vitals reviewed.  General:  Right periorbital edema, eyes is closed.  Black eye, Pleasantly confused, awake   Chest: Clear to auscultation bilaterally anteriorly.    Heart: S1, S2, no appreciable murmur  Abdomen: Soft, nontender, BS +, abdominal binder on  MSK: Warm, 1+ pitting edema bilateral lower extremity, positive ankle swelling as well bilaterally  Neurologic:  Awake but unable to assess orientation.  Unable to answer most of the questions.  Able to move all 4 extremities        C. Medical decision making:  Acute metabolic encephalopathy vs chronic with advancing dementia   E coli UTI - POA,   Essential hypertension, controlled  Right periorbital swelling/hematoma  SUSANNA, resolved   1 Focal lytic lesion in the left sphenoid wing  Hyperlipidemia  Dementia with behavioral disturbances versus delirium        Plan  Patient has been pulling off IV lines   Change to p.o. cefdinir 300 mg b.i.d. for 2 more days   Awaiting placement  Urine cultures growing E coli on IV ceftriaxone.    given ams on admit will treat for 5 days   Continue 1 on 1 sitter   Add on Seroquel 25 mg q.a.m.continue Seroquel at bedtime 50 mg   Blood pressure is getting too controlled will discontinue nifedipine   Appreciate ophthalmology input to follow-up outpatient, nothing to do at this time   PRN iv hydralazine for systolic blood pressure greater than 160  Continue appropriate home medications once med rec updated,   Noted in the ED, Case Management has contacted EPS as well, probable a placement, could be a difficult placement if  family is un reachable/financial information etc.  PT OT consulted to evaluate for strength and weaknesses        VTE Prophylaxis:    SCD, will avoid chemical prophylaxis given hematoma right periorbital region     Patient condition:   Fair     Discharge Planning and Disposition/Anticipated discharge:  For nursing home prison        Smoking status unknown as pt is unable to answer the question     Advanced directives were not discussed given pt mental status and no family is reachable                VITAL SIGNS: 24 HRS MIN & MAX LAST   Temp  Min: 97.5 °F (36.4 °C)  Max: 98.4 °F (36.9 °C) 97.6 °F (36.4 °C)   BP  Min: 113/87  Max: 152/68 (!) 151/73   Pulse  Min: 74  Max: 93  78   Resp  Min: 18  Max: 19 18   SpO2  Min: 96 %  Max: 99 % 99 %     I have reviewed the following labs:    Recent Labs   Lab 05/30/23  0357 05/31/23  1341   WBC 5.95 4.03*   RBC 3.48* 3.74*   HGB 11.0* 12.0   HCT 34.0* 35.7*   MCV 97.7* 95.5*   MCH 31.6* 32.1*   MCHC 32.4* 33.6   RDW 13.3 13.6    233   MPV 9.4 9.0       Recent Labs   Lab 05/30/23  0357 05/31/23  1341    142   K 4.0 4.1   CO2 22* 29   BUN 41.3* 16.8   CREATININE 1.49* 0.70   CALCIUM 8.8 8.4   MG 1.90  --    ALBUMIN 3.6 3.2*   ALKPHOS 133 95   ALT 17 14   AST 23 21   BILITOT 0.3 0.2          Microbiology Results (last 7 days)       Procedure Component Value Units Date/Time    Urine culture [[id number]]  (Abnormal)  (Susceptibility) Collected: 05/30/23 0346    Order Status: Completed Specimen: Urine Updated: 06/02/23 0659     Urine Culture >/= 100,000 colonies/ml Escherichia coli             See below for Radiology    Scheduled Med:   atorvastatin  40 mg Oral Daily    cefdinir  300 mg Oral Q12H    diclofenac sodium  2 g Topical (Top) QID    hydrALAZINE  100 mg Oral Q8H    latanoprost  1 drop Both Eyes QHS    LIDOcaine (PF) 10 mg/ml (1%)  3.6 mL Intramuscular Daily    QUEtiapine  25 mg Oral Daily    QUEtiapine  50 mg Oral QHS    rivastigmine tartrate  3 mg Oral BID         Continuous Infusions:       PRN Meds:  acetaminophen, acetaminophen, cloNIDine, dextrose 10%, dextrose 10%, glucagon (human recombinant), glucose, glucose, haloperidol lactate, hydrALAZINE, labetaloL, ondansetron       Assessment/Plan:      VTE prophylaxis:     Patient condition:  Stable/Fair/Guarded/ Serious/ Critical    Anticipated discharge and Disposition:         All diagnosis and differential diagnosis have been reviewed; assessment and plan has been documented; I have personally reviewed the labs and test results that are presently available; I have reviewed the patients medication list; I have reviewed the consulting providers response and recommendations. I have reviewed or attempted to review medical records based upon their availability    All of the patient's questions have been  addressed and answered. Patient's is agreeable to the above stated plan. I will continue to monitor closely and make adjustments to medical management as needed.  _____________________________________________________________________    Nutrition Status:    Radiology:  I have personally reviewed the following imaging and agree with the radiologist.     XR Metastatic Survey  EXAMINATION  XR METASTATIC SURVEY    CLINICAL HISTORY  1 Lytic lesion on CT, poor historiam, no family available.;    TECHNIQUE  A total of 22 images of the axial and appendicular skeletal structures were obtained for purposes of metastatic survey.    COMPARISON  None available at the time of initial interpretation.    FINDINGS  The visualized osseous structures are without convincing expansile, lytic, or sclerotic lesion. No findings to suggest focal bone disease are appreciated. The trabecular pattern is unremarkable.    There is no acute vertebral column malalignment, cortical displacement, or articular subluxation.  Regional degenerative changes are noted.  No skeletal asymmetry is appreciated.    The included soft tissues are without convincing acute  abnormality.  Scattered vascular calcification is present.  No focal abnormality of the visualized intrathoracic and abdominopelvic regions is appreciated.    IMPRESSION  1. No convincing destructive skeletal lesion by radiographic assessment.  2. Chronic secondary details discussed above.    Electronically signed by: Amilcar Stern  Date:    05/30/2023  Time:    13:42  CT Cervical Spine Without Contrast  Narrative: Technique:CT of the cervical spine was performed without intravenous contrast with axial as well as sagittal and coronal images.    Comparison:None.    Dosage Information:Automated exposure control was utilized.      Clinical history:Fall, facial injury.    Findings:    Lung apices:The visualized lung apices appear unremarkable.    Mineralization:Mild osteopenia is seen in the visualized bony structures of the cervical spine.    Rotation:Leftward rotation of C1 with respect to C2 is seen.    Scoliosis:No significant scoliosis is seen.    Vertebral Fusion:No vertebral fusion is identified.    Listhesis:No significant listhesis is identified.    Lordosis:Degenerative straightening of the cervical lordosis is seen.    Intervertebral disc spaces:Multilevel loss of disc height is seen.    Osteophytes:Multilevel endplate osteophytes are seen.    Endplate Sclerosis:Multilevel endplate sclerosis is seen.    Uncovertebral degenerative changes:Multilevel uncovertebral joint arthrosis is seen.    Facet degenerative changes:Multilevel facet degenerative changes are seen.    Fractures:No acute cervical spine fracture dislocation or subluxation is seen.    This exam does not exclude the possibility of intrathecal soft tissue, ligamentous or vascular injury.  Impression: Impression:    1. No acute cervical spine fracture dislocation or subluxation is seen.    2. Degenerative changes and other details as above.    No significant discrepancy with overnight report.    Electronically signed by: Cuco  Posey  Date:    05/30/2023  Time:    07:40  CT Maxillofacial Without Contrast  Narrative: Technique:Noncontrast maxillofacial CT was performed with axial as well as sagittal and coronal images being submitted for interpretation.    Automated exposure control was utilized to minimize radiation dose     Comparison:None.    Clinical history:Fall, facial injury.    Findings:    Orbital soft tissues:There is moderate soft tissue swelling in the right periorbital and preseptal spaces. There is internal hyperdensity within the swelling which is consistent with hematoma. This is consistent with soft tissue contusion with associated hematoma. No underlying bony injury is seen.    preseptal soft tissues:The left preseptal soft tissues appear unremarkable.    post- septal soft tissues:The bilateral extraconal soft tissues appear unremarkable. The bilateral intraconal soft tissues appear unremarkable.    Bones:    Orbital bony structures:The bilateral orbital bony structures are intact with no orbital fracture identified.    Mandible:The mandible appears unremarkable.    Maxilla:The maxilla appears unremarkable.    Pterygoid plates:No fracture identified of the right or left pterygoid plates.    Zygoma:The zygomatic arches are intact.    TMJ:The mandibular condyles appear normally placed with respect to the mandibular fossa.    Nasal Bones:The nasal septum is midline.    Skull:No acute linear or depressed fracture is identified in the visualized skull.    Paranasal sinuses:The visualized paranasal sinuses appear clear with no mucoperiosteal thickening or air fluid levels identified.    Mastoid air cells:The visualized mastoid air cells appear clear.    Brain:Intracranial findings discussed separately.  Impression: Impression:    1. There is moderate soft tissue swelling in the right periorbital and preseptal spaces. There is internal hyperdensity within the swelling which is consistent with hematoma. This is consistent  with soft tissue contusion with associated hematoma. No underlying bony injury is seen.    2. No acute maxillofacial fracture identified. Details and other findings as noted above.    No significant discrepancy with overnight report.    Electronically signed by: Cuco Posey  Date:    05/30/2023  Time:    07:37  CT Head Without Contrast  Narrative: Technique:CT of the head was performed without intravenous contrast with axial as well as coronal and sagittal images.    Comparison:None.    Dosage Information:Automated exposure control was utilized.  DLP 1337    Clinical history:Fall, facial injury.    Findings:    Hemorrhage:No acute intracranial hemorrhage is seen.    CSF spaces:The ventricles, sulci and basal cisterns all appear mildly prominent consistent with global cerebral atrophy.    Brain parenchyma:There is preservation of the grey white junction throughout. Mild to moderate microvascular change is seen in portions of the periventricular and deep white matter tracts.    Cerebellum:Unremarkable..    Calvarium:There is a focal lytic lesion in the left sphenoid wing on series 4 image 44. No acute linear or depressed skull fracture is seen.    Scalp:Minimal scalp soft tissue swelling is seen along the left parietal bone. The swelling is centered on image 20, Series 3. This consistent with a scalp contusion. No underlying bone injury is identified.    Maxillofacial Structures:Maxillofacial findings discussed separately in the maxillofacial CT report.  Impression: Impression:    1. Minimal scalp soft tissue swelling is seen along the left parietal bone. This consistent with a scalp contusion. No underlying bone injury is identified.    2. There is a focal lytic lesion in the left sphenoid wing on series 4 image 44. Correlate clinically as regards additional evaluation and follow-up.    3. No acute intracranial traumatic injury identified. Details and other findings as noted above.    No significant discrepancy with  overnight report.    Electronically signed by: Cuco Posey  Date:    05/30/2023  Time:    07:35      German Boateng MD   06/03/2023

## 2023-06-04 PROCEDURE — 21400001 HC TELEMETRY ROOM

## 2023-06-04 PROCEDURE — 25000003 PHARM REV CODE 250: Performed by: NURSE PRACTITIONER

## 2023-06-04 PROCEDURE — 25000003 PHARM REV CODE 250: Performed by: INTERNAL MEDICINE

## 2023-06-04 PROCEDURE — 63600175 PHARM REV CODE 636 W HCPCS: Performed by: NURSE PRACTITIONER

## 2023-06-04 RX ADMIN — QUETIAPINE FUMARATE 50 MG: 25 TABLET ORAL at 08:06

## 2023-06-04 RX ADMIN — HYDRALAZINE HYDROCHLORIDE 100 MG: 50 TABLET, FILM COATED ORAL at 05:06

## 2023-06-04 RX ADMIN — RIVASTIGMINE TARTRATE 3 MG: 1.5 CAPSULE ORAL at 08:06

## 2023-06-04 RX ADMIN — DICLOFENAC SODIUM 2 G: 10 GEL TOPICAL at 04:06

## 2023-06-04 RX ADMIN — CEFDINIR 300 MG: 300 CAPSULE ORAL at 08:06

## 2023-06-04 RX ADMIN — LATANOPROST 1 DROP: 50 SOLUTION OPHTHALMIC at 08:06

## 2023-06-04 RX ADMIN — HYDRALAZINE HYDROCHLORIDE 100 MG: 50 TABLET, FILM COATED ORAL at 08:06

## 2023-06-04 RX ADMIN — QUETIAPINE FUMARATE 25 MG: 25 TABLET ORAL at 08:06

## 2023-06-04 RX ADMIN — DICLOFENAC SODIUM 2 G: 10 GEL TOPICAL at 08:06

## 2023-06-04 RX ADMIN — ATORVASTATIN CALCIUM 40 MG: 40 TABLET, FILM COATED ORAL at 08:06

## 2023-06-04 RX ADMIN — HYDRALAZINE HYDROCHLORIDE 100 MG: 50 TABLET, FILM COATED ORAL at 01:06

## 2023-06-04 RX ADMIN — HALOPERIDOL LACTATE 5 MG: 5 INJECTION, SOLUTION INTRAMUSCULAR at 04:06

## 2023-06-04 RX ADMIN — DICLOFENAC SODIUM 2 G: 10 GEL TOPICAL at 01:06

## 2023-06-04 NOTE — PLAN OF CARE
Problem: Adult Inpatient Plan of Care  Goal: Plan of Care Review  Outcome: Ongoing, Progressing  Flowsheets (Taken 6/4/2023 0040)  Plan of Care Reviewed With: patient  Goal: Patient-Specific Goal (Individualized)  Outcome: Ongoing, Progressing  Goal: Absence of Hospital-Acquired Illness or Injury  Outcome: Ongoing, Progressing  Goal: Optimal Comfort and Wellbeing  Outcome: Ongoing, Progressing  Intervention: Monitor Pain and Promote Comfort  Flowsheets (Taken 6/4/2023 0040)  Pain Management Interventions:   care clustered   medication offered   quiet environment facilitated   position adjusted  Intervention: Provide Person-Centered Care  Flowsheets (Taken 6/4/2023 0040)  Trust Relationship/Rapport:   reassurance provided   choices provided   care explained   emotional support provided   empathic listening provided   questions answered   questions encouraged   thoughts/feelings acknowledged  Goal: Readiness for Transition of Care  Outcome: Ongoing, Progressing     Problem: Skin Injury Risk Increased  Goal: Skin Health and Integrity  Outcome: Ongoing, Progressing     Problem: Impaired Wound Healing  Goal: Optimal Wound Healing  Outcome: Ongoing, Progressing

## 2023-06-05 PROCEDURE — 97530 THERAPEUTIC ACTIVITIES: CPT | Mod: CO

## 2023-06-05 PROCEDURE — 25000003 PHARM REV CODE 250: Performed by: INTERNAL MEDICINE

## 2023-06-05 PROCEDURE — 25000003 PHARM REV CODE 250: Performed by: NURSE PRACTITIONER

## 2023-06-05 PROCEDURE — 97116 GAIT TRAINING THERAPY: CPT | Mod: CQ

## 2023-06-05 PROCEDURE — 63600175 PHARM REV CODE 636 W HCPCS: Performed by: NURSE PRACTITIONER

## 2023-06-05 PROCEDURE — 21400001 HC TELEMETRY ROOM

## 2023-06-05 RX ADMIN — DICLOFENAC SODIUM 2 G: 10 GEL TOPICAL at 10:06

## 2023-06-05 RX ADMIN — RIVASTIGMINE TARTRATE 3 MG: 1.5 CAPSULE ORAL at 08:06

## 2023-06-05 RX ADMIN — HYDRALAZINE HYDROCHLORIDE 100 MG: 50 TABLET, FILM COATED ORAL at 01:06

## 2023-06-05 RX ADMIN — DICLOFENAC SODIUM 2 G: 10 GEL TOPICAL at 01:06

## 2023-06-05 RX ADMIN — RIVASTIGMINE TARTRATE 3 MG: 1.5 CAPSULE ORAL at 10:06

## 2023-06-05 RX ADMIN — HYDRALAZINE HYDROCHLORIDE 100 MG: 50 TABLET, FILM COATED ORAL at 09:06

## 2023-06-05 RX ADMIN — QUETIAPINE FUMARATE 25 MG: 25 TABLET ORAL at 10:06

## 2023-06-05 RX ADMIN — DICLOFENAC SODIUM 2 G: 10 GEL TOPICAL at 05:06

## 2023-06-05 RX ADMIN — LATANOPROST 1 DROP: 50 SOLUTION OPHTHALMIC at 08:06

## 2023-06-05 RX ADMIN — CLONIDINE HYDROCHLORIDE 0.2 MG: 0.2 TABLET ORAL at 05:06

## 2023-06-05 RX ADMIN — QUETIAPINE FUMARATE 50 MG: 25 TABLET ORAL at 08:06

## 2023-06-05 RX ADMIN — DICLOFENAC SODIUM 2 G: 10 GEL TOPICAL at 08:06

## 2023-06-05 RX ADMIN — HALOPERIDOL LACTATE 5 MG: 5 INJECTION, SOLUTION INTRAMUSCULAR at 05:06

## 2023-06-05 RX ADMIN — ATORVASTATIN CALCIUM 40 MG: 40 TABLET, FILM COATED ORAL at 10:06

## 2023-06-05 NOTE — PT/OT/SLP PROGRESS
"Occupational Therapy   Treatment    Name: Kathleen Arevalo  MRN: [id number]  Admitting Diagnosis:  <principal problem not specified>       Recommendations:     Discharge Recommendations: nursing facility, basic  Discharge Equipment Recommendations:  to be determined by next level of care  Barriers to discharge:  Decreased caregiver support, Other (Comment) (high fall risk and confusion)    Assessment:     Kathleen Arevalo is a 77 y.o. female with a medical diagnosis of AMS.  She presents with sundowning, impulsive. Performance deficits affecting function are weakness, gait instability, impaired balance, decreased safety awareness, impaired cognition, impaired self care skills, impaired functional mobility.     Sundowning during therapy session, unable to redirect as pt was highly agitated and perseverating on therapist and staff "stealing her belongings," and stated that "she was stupid." Reassured pt that all belongings are present in room and orienting pt to location/situation. Pt unable to process and states therapist is lying then grabbing cane and attempting to swing. Ambulated in hallway and pt wanting to leave hospital, and therapist provided redirection and orientation again. Agitated but agreeable to sit in foldable chair. RN and CNAs present at end of session to assess  process.     Pt is currently a high fall risk at this time, requiring min-mod A 2/2 unsteadiness, poor safety awareness, and confusion. Pt will require A for all mobility 2/2 above deficits to prevent risk of injury/fall. Educated CNA on effects of sundowning.     Rehab Prognosis:  Fair; patient would benefit from acute skilled OT services to address these deficits and reach maximum level of function.       Plan:     Patient to be seen 3 x/week to address the above listed problems via self-care/home management, therapeutic activities, therapeutic exercises  Plan of Care Expires: 06/28/23  Plan of Care Reviewed with: patient    Subjective "     Pain/Comfort:       Objective:     Communicated with: Patient found up in chair with  (1:1, ) upon OT entry to room.    General Precautions: Standard, fall    Orthopedic Precautions:   Braces:    Respiratory Status: Room air  Vital Signs: Respiratory Status: on room air     Occupational Performance:     Functional Mobility/Transfers:  Functional Mobility: in-room navigation with straight cane with min-mod A 2/2 unsteadiness and poor safety awareness. Multiple LOB during navigation and declined use of RW. Poor use of cane, holding up cane and furniture walking.        Therapeutic Positioning    OT interventions performed during the course of today's session in an effort to prevent and/or reduce acquired pressure injuries:   Positioning plan established with care team    Skin assessment: all bony prominences were assessed    Findings: no redness or breakdown noted    [facility] 6 Click ADL:      Patient Education:  Patient provided with verbal education regarding OT role/goals/POC, fall prevention, and safety awareness.  Additional teaching is warranted.      Patient left up in chair with all lines intact, call button in reach, and     GOALS:   Multidisciplinary Problems       Occupational Therapy Goals          Problem: Occupational Therapy    Goal Priority Disciplines Outcome Interventions   Occupational Therapy Goal     OT, PT/OT Ongoing, Progressing    Description: Goals to be met by: 6/28/23     Patient will increase functional independence with ADLs by performing:    UE Dressing with Stand-by Assistance.  LE Dressing with Stand-by Assistance.  Grooming while standing with Stand-by Assistance.  Toileting from toilet with Stand-by Assistance for hygiene and clothing management.   Toilet transfer to toilet with Stand-by Assistance.                         Time Tracking:     OT Date of Treatment: 06/05/23  OT Start Time: 1449  OT Stop Time: 1500  OT Total Time (min): 11 min    Billable Minutes:Therapeutic  Activity 11    OT/MEHDI: MEHDI     Number of MEHDI visits since last OT visit: 2    6/5/2023

## 2023-06-05 NOTE — PLAN OF CARE
Christopher sent available clinicals to [facility] Phone: [phone] via [employer]. Christopher followed up with Lizy regarding secure unit being completed as discussed yesterday. Lizy stated she will check with maintenance and contact me afterwards.

## 2023-06-05 NOTE — PT/OT/SLP PROGRESS
Physical Therapy Treatment    Patient Name:  Kathleen Arevalo   MRN:  [id number]    Recommendations:     Discharge Recommendations: nursing facility, basic  Discharge Equipment Recommendations: to be determined by next level of care  Barriers to discharge: Decreased caregiver support and impaired cognition    Assessment:     Kathleen Arevalo is a 77 y.o. female admitted with a medical diagnosis of  AMS. Per chart, patient was found by police wandering around neighborhood. Right eye swollen from fall that occurred on Sunday. She has history of dementia. She presents with the following impairments/functional limitations: weakness, gait instability, impaired balance, decreased safety awareness .  Pt required increased time for all activities and heavy vcs to stay on task.     Rehab Prognosis: Good; patient would benefit from acute skilled PT services to address these deficits and reach maximum level of function.    Recent Surgery: * No surgery found *      Plan:     During this hospitalization, patient to be seen 3 x/week to address the identified rehab impairments via gait training, therapeutic activities, therapeutic exercises, neuromuscular re-education and progress toward the following goals:    Plan of Care Expires:  06/30/23    Subjective     Chief Complaint:   Patient/Family Comments/goals:   Pain/Comfort:  Location - Side 1: Bilateral  Location 1: knee  Pain Addressed 1: Reposition, Distraction, Cessation of Activity      Objective:     Communicated with NSG prior to session.  Patient found sitting edge of bed with 1:1 upon PT entry to room.     General Precautions: Standard, fall  Orthopedic Precautions: N/A  Braces: N/A  Respiratory Status: Room air  Blood Pressure:   Skin Integrity: Visible skin intact      Functional Mobility:  Transfers:     Sit to Stand:  contact guard assistance with rolling walker and 1 trial from EOB  Gait: pt amb 50ft with RW Veronique. Pt required vcs to stay close to RW to ensure safety. Pt with 2  minor LOB and was Veronique to correct. Pt easily distracted.     BLE AROM: ankle pumps, knee flex/ext, alt marching, hip add/abd. 15 reps BLE.       Patient left sitting edge of bed with call button in reach and 1:1 present..    GOALS:   Multidisciplinary Problems       Physical Therapy Goals          Problem: Physical Therapy    Goal Priority Disciplines Outcome Goal Variances Interventions   Physical Therapy Goal     PT, PT/OT Ongoing, Progressing     Description: Goals to be met by: 23     Patient will increase functional independence with mobility by performin. Sit to stand transfer with Modified [place]  2. Gait  x 300 feet with Modified [place] using Rolling Walker.                          Time Tracking:     PT Received On: 23  PT Start Time: 1109     PT Stop Time: 1122  PT Total Time (min): 13 min     Billable Minutes: Gait Training 8 and Therapeutic Exercise 5    Treatment Type: Treatment  PT/PTA: PTA     Number of PTA visits since last PT visit: 2     2023

## 2023-06-05 NOTE — PROGRESS NOTES
"Inpatient Nutrition Evaluation    Admit Date: 5/30/2023   Total duration of encounter: 6 days    Nutrition Recommendation/Prescription     Continue heart healthy diet as tolerated  RD to monitor po intake and weight changes    Nutrition Assessment     Chart Review    Reason Seen: length of stay    Malnutrition Screening Tool Results   Have you recently lost weight without trying?: No  Have you been eating poorly because of a decreased appetite?: No   MST Score: 0     Diagnosis:  Acute metabolic encephalopathy vs chronic with advancing dementia   E coli UTI - POA,   Essential hypertension, controlled  Right periorbital swelling/hematoma  SUSANNA, resolved   1 Focal lytic lesion in the left sphenoid wing  Hyperlipidemia  Dementia with behavioral disturbances versus delirium    Relevant Medical History: HTN, HLD, dementia    Nutrition-Related Medications: Zofran PRN    Nutrition-Related Labs: no new labs      Diet Order: Diet heart healthy  Oral Supplement Order: none  Appetite/Oral Intake: good/% of meals  Factors Affecting Nutritional Intake: none identified  Food/Denominational/Cultural Preferences: unable to obtain  Food Allergies: unable to obtain    Skin Integrity: bruised (ecchymotic)  Wound(s):       Comments    6/5: pt and sitter reports good appetite since admission, eating 100% of meals. No n/v/d/c reported, with last BM yesterday per sitter. Pt unable to provide UBW, reports no weight loss. Bed weight of 72.8 kg (160 lb) on 5/30. Per EMR weights, possible 7% weight loss in 4 months/ 14.4% weight loss in 10 months. Weight loss not significant at this time. Will continue to monitor.    Anthropometrics    Height: 5' 2" (157.5 cm)    Last Weight: 72.8 kg (160 lb 7.9 oz) (05/30/23 1534) Weight Method: Bed Scale  BMI (Calculated): 29.3  BMI Classification: overweight (BMI 25-29.9)     Ideal Body Weight (IBW), Female: 110 lb     % Ideal Body Weight, Female (lb): 145.91 %                             Usual Weight " Provided By: EMR weight history    Wt Readings from Last 5 Encounters:   05/30/23 72.8 kg (160 lb 7.9 oz)   01/04/23 78.2 kg (172 lb 6.4 oz)   10/06/22 80.5 kg (177 lb 7.5 oz)   07/28/22 84.4 kg (186 lb 1.1 oz)   07/19/22 84.4 kg (186 lb 1.1 oz)     Weight Change(s) Since Admission:  Admit Weight: 74.8 kg (165 lb) (05/30/23 0158)      Patient Education    Not applicable.    Monitoring & Evaluation     Dietitian will monitor food and beverage intake and weight change.  Nutrition Risk/Follow-Up: low (follow-up in 5-7 days)  Patients assigned 'low nutrition risk' status do not qualify for a full nutritional assessment but will be monitored and re-evaluated in a 5-7 day time period. Please consult if re-evaluation needed sooner.

## 2023-06-05 NOTE — PLAN OF CARE
Problem: Adult Inpatient Plan of Care  Goal: Plan of Care Review  Outcome: Ongoing, Progressing  Flowsheets (Taken 6/5/2023 0300)  Plan of Care Reviewed With: patient  Goal: Patient-Specific Goal (Individualized)  Outcome: Ongoing, Progressing  Goal: Absence of Hospital-Acquired Illness or Injury  Outcome: Ongoing, Progressing  Intervention: Identify and Manage Fall Risk  Flowsheets (Taken 6/5/2023 0300)  Safety Promotion/Fall Prevention:   assistive device/personal item within reach   instructed to call staff for mobility   Fall Risk reviewed with patient/family   Fall Risk signage in place   lighting adjusted   medications reviewed   supervised activity   /camera at bedside  Intervention: Prevent Skin Injury  Flowsheets (Taken 6/5/2023 0300)  Body Position: position changed independently  Skin Protection: tubing/devices free from skin contact  Intervention: Prevent and Manage VTE (Venous Thromboembolism) Risk  Flowsheets (Taken 6/5/2023 0300)  Activity Management: Ambulated to bathroom - L4  VTE Prevention/Management:   bleeding precations maintained   bleeding risk assessed   dorsiflexion/plantar flexion performed  Range of Motion:   active ROM (range of motion) encouraged   ROM (range of motion) performed  Intervention: Prevent Infection  Flowsheets (Taken 6/5/2023 0300)  Infection Prevention:   equipment surfaces disinfected   hand hygiene promoted   personal protective equipment utilized   single patient room provided   visitors restricted/screened   rest/sleep promoted  Goal: Optimal Comfort and Wellbeing  Outcome: Ongoing, Progressing  Intervention: Monitor Pain and Promote Comfort  Flowsheets (Taken 6/5/2023 0300)  Pain Management Interventions:   care clustered   medication offered   pillow support provided   relaxation techniques promoted   quiet environment facilitated  Intervention: Provide Person-Centered Care  Flowsheets (Taken 6/5/2023 0300)  Trust Relationship/Rapport:   care  explained   choices provided   emotional support provided   empathic listening provided   questions answered   questions encouraged   thoughts/feelings acknowledged   reassurance provided  Goal: Readiness for Transition of Care  Outcome: Ongoing, Progressing     Problem: Skin Injury Risk Increased  Goal: Skin Health and Integrity  Outcome: Ongoing, Progressing     Problem: Impaired Wound Healing  Goal: Optimal Wound Healing  Outcome: Ongoing, Progressing

## 2023-06-05 NOTE — PROGRESS NOTES
[facility]  Hospital Medicine Progress Note        A. History:   History is very limited given patient is a poor historian.  Most of the information was gathered from hospital chart, medical records and ED staff  This is a 77-year-old female with a known past medical history of essential hypertension, hyperlipidemia, dementia who was brought in last night on 05/30/2023 where EMS per police for altered mental status.  Patient was found wandering around in a neighborhood.  The police was able to get her back to her home but it was locked and she was unable to get in.  Please also noted bruising and swelling of her right eye.  She reported to police she fell Sunday.  Today on our questioning she said she fell yesterday.  To the ED the she told them she fell a month ago.  Currently she is denying any complaints.  She said off and on she hurts.  She mentioned that she was going to drop her mom off to the , pleasantly confused.  She asked if she can stay in the hospital.  Informed her we are admitting her and treating her urinary tract infection and will take care of her.  She smiled and closed her eyes  Reviewed patient's labs, she has a mild SUSANNA with creatinine of 1.4, when compared to her last labs in October 2022 when creatinine was normal  On CT of her head, it shows minimal scalp soft tissue swelling along the left parietal bone consistent with scalp contusion.  Focal lytic lesion in the left sphenoid wing  CT maxillofacial shows moderate swelling in the right periorbital and norberto septal space.  There is internal hyperdensity within the swelling which is consistent with hematoma.  No underlying bony injury seen  Ordered XR metastatic survey no urinalysis did not show proteinuria  UA does shows UTI, urine cultures in process        Patient currently being managed for acute on chronic encephalopathy, acute UTI, right periorbital edema with hematoma.    Overnight ophthalmology  evaluation was conducted - nothing to do to follow-up with the outpatient ophthalmologist   X-ray metastatic survey was negative   Urine cultures growing e coli           Today's information  Patient seen and examined at bedside, sitter at bedside  Patient has no new complaints today   Awaiting placement  Considering use of  but patient still at risk of fall         B. Physical exam:  Vitals reviewed.  General:  Right periorbital edema, eyes is closed.  Black eye, Pleasantly confused, awake   Chest: Clear to auscultation bilaterally anteriorly.    Heart: S1, S2, no appreciable murmur  Abdomen: Soft, nontender, BS +, abdominal binder on  MSK: Warm, 1+ pitting edema bilateral lower extremity, positive ankle swelling as well bilaterally  Neurologic:  Awake but unable to assess orientation.  Unable to answer most of the questions.  Able to move all 4 extremities        C. Medical decision making:  Acute metabolic encephalopathy vs chronic with advancing dementia   E coli UTI - POA,   Essential hypertension, controlled  Right periorbital swelling/hematoma  SUSANNA, resolved   1 Focal lytic lesion in the left sphenoid wing  Hyperlipidemia  Dementia with behavioral disturbances versus delirium        Plan  Considering use of  but patient still at risk of fall   Urine cultures growing E coli s/p treatment    given ams on admit will treated for 5 days   Continue 1 on 1 sitter   Add on Seroquel 25 mg q.a.m.continue Seroquel at bedtime 50 mg   Blood pressure is getting too controlled will discontinue nifedipine   Appreciate ophthalmology input to follow-up outpatient, nothing to do at this time   PRN iv hydralazine for systolic blood pressure greater than 160  Continue appropriate home medications once med rec updated,   Noted in the ED, Case Management has contacted EPS as well, probable a placement, could be a difficult placement if family is un reachable/financial information etc.  PT OT consulted to evaluate for strength  and weaknesses        VTE Prophylaxis:    SCD, will avoid chemical prophylaxis given hematoma right periorbital region     Patient condition:   Fair     Discharge Planning and Disposition/Anticipated discharge:  For nursing home jail        Smoking status unknown as pt is unable to answer the question     Advanced directives were not discussed given pt mental status and no family is reachable                VITAL SIGNS: 24 HRS MIN & MAX LAST   Temp  Min: 32 °F (0 °C)  Max: 98.5 °F (36.9 °C) 97.9 °F (36.6 °C)   BP  Min: 119/51  Max: 176/83 (!) 163/69   Pulse  Min: 69  Max: 89  71   Resp  Min: 16  Max: 18 16   SpO2  Min: 97 %  Max: 100 % 100 %     I have reviewed the following labs:    Recent Labs   Lab 05/30/23  0357 05/31/23  1341   WBC 5.95 4.03*   RBC 3.48* 3.74*   HGB 11.0* 12.0   HCT 34.0* 35.7*   MCV 97.7* 95.5*   MCH 31.6* 32.1*   MCHC 32.4* 33.6   RDW 13.3 13.6    233   MPV 9.4 9.0       Recent Labs   Lab 05/30/23  0357 05/31/23  1341    142   K 4.0 4.1   CO2 22* 29   BUN 41.3* 16.8   CREATININE 1.49* 0.70   CALCIUM 8.8 8.4   MG 1.90  --    ALBUMIN 3.6 3.2*   ALKPHOS 133 95   ALT 17 14   AST 23 21   BILITOT 0.3 0.2          Microbiology Results (last 7 days)       Procedure Component Value Units Date/Time    Urine culture [[id number]]  (Abnormal)  (Susceptibility) Collected: 05/30/23 0346    Order Status: Completed Specimen: Urine Updated: 06/02/23 0659     Urine Culture >/= 100,000 colonies/ml Escherichia coli             See below for Radiology    Scheduled Med:   atorvastatin  40 mg Oral Daily    diclofenac sodium  2 g Topical (Top) QID    hydrALAZINE  100 mg Oral Q8H    latanoprost  1 drop Both Eyes QHS    QUEtiapine  25 mg Oral Daily    QUEtiapine  50 mg Oral QHS    rivastigmine tartrate  3 mg Oral BID        Continuous Infusions:       PRN Meds:  acetaminophen, acetaminophen, cloNIDine, dextrose 10%, dextrose 10%, glucagon (human recombinant), glucose, glucose, haloperidol lactate,  hydrALAZINE, labetaloL, ondansetron       Assessment/Plan:      VTE prophylaxis:     Patient condition:  Stable/Fair/Guarded/ Serious/ Critical    Anticipated discharge and Disposition:         All diagnosis and differential diagnosis have been reviewed; assessment and plan has been documented; I have personally reviewed the labs and test results that are presently available; I have reviewed the patients medication list; I have reviewed the consulting providers response and recommendations. I have reviewed or attempted to review medical records based upon their availability    All of the patient's questions have been  addressed and answered. Patient's is agreeable to the above stated plan. I will continue to monitor closely and make adjustments to medical management as needed.  _____________________________________________________________________    Nutrition Status:    Radiology:  I have personally reviewed the following imaging and agree with the radiologist.     XR Metastatic Survey  EXAMINATION  XR METASTATIC SURVEY    CLINICAL HISTORY  1 Lytic lesion on CT, poor historiam, no family available.;    TECHNIQUE  A total of 22 images of the axial and appendicular skeletal structures were obtained for purposes of metastatic survey.    COMPARISON  None available at the time of initial interpretation.    FINDINGS  The visualized osseous structures are without convincing expansile, lytic, or sclerotic lesion. No findings to suggest focal bone disease are appreciated. The trabecular pattern is unremarkable.    There is no acute vertebral column malalignment, cortical displacement, or articular subluxation.  Regional degenerative changes are noted.  No skeletal asymmetry is appreciated.    The included soft tissues are without convincing acute abnormality.  Scattered vascular calcification is present.  No focal abnormality of the visualized intrathoracic and abdominopelvic regions is appreciated.    IMPRESSION  1. No  convincing destructive skeletal lesion by radiographic assessment.  2. Chronic secondary details discussed above.    Electronically signed by: Amilcar Stern  Date:    05/30/2023  Time:    13:42  CT Cervical Spine Without Contrast  Narrative: Technique:CT of the cervical spine was performed without intravenous contrast with axial as well as sagittal and coronal images.    Comparison:None.    Dosage Information:Automated exposure control was utilized.      Clinical history:Fall, facial injury.    Findings:    Lung apices:The visualized lung apices appear unremarkable.    Mineralization:Mild osteopenia is seen in the visualized bony structures of the cervical spine.    Rotation:Leftward rotation of C1 with respect to C2 is seen.    Scoliosis:No significant scoliosis is seen.    Vertebral Fusion:No vertebral fusion is identified.    Listhesis:No significant listhesis is identified.    Lordosis:Degenerative straightening of the cervical lordosis is seen.    Intervertebral disc spaces:Multilevel loss of disc height is seen.    Osteophytes:Multilevel endplate osteophytes are seen.    Endplate Sclerosis:Multilevel endplate sclerosis is seen.    Uncovertebral degenerative changes:Multilevel uncovertebral joint arthrosis is seen.    Facet degenerative changes:Multilevel facet degenerative changes are seen.    Fractures:No acute cervical spine fracture dislocation or subluxation is seen.    This exam does not exclude the possibility of intrathecal soft tissue, ligamentous or vascular injury.  Impression: Impression:    1. No acute cervical spine fracture dislocation or subluxation is seen.    2. Degenerative changes and other details as above.    No significant discrepancy with overnight report.    Electronically signed by: Cuco Posey  Date:    05/30/2023  Time:    07:40  CT Maxillofacial Without Contrast  Narrative: Technique:Noncontrast maxillofacial CT was performed with axial as well as sagittal and coronal images  being submitted for interpretation.    Automated exposure control was utilized to minimize radiation dose     Comparison:None.    Clinical history:Fall, facial injury.    Findings:    Orbital soft tissues:There is moderate soft tissue swelling in the right periorbital and preseptal spaces. There is internal hyperdensity within the swelling which is consistent with hematoma. This is consistent with soft tissue contusion with associated hematoma. No underlying bony injury is seen.    preseptal soft tissues:The left preseptal soft tissues appear unremarkable.    post- septal soft tissues:The bilateral extraconal soft tissues appear unremarkable. The bilateral intraconal soft tissues appear unremarkable.    Bones:    Orbital bony structures:The bilateral orbital bony structures are intact with no orbital fracture identified.    Mandible:The mandible appears unremarkable.    Maxilla:The maxilla appears unremarkable.    Pterygoid plates:No fracture identified of the right or left pterygoid plates.    Zygoma:The zygomatic arches are intact.    TMJ:The mandibular condyles appear normally placed with respect to the mandibular fossa.    Nasal Bones:The nasal septum is midline.    Skull:No acute linear or depressed fracture is identified in the visualized skull.    Paranasal sinuses:The visualized paranasal sinuses appear clear with no mucoperiosteal thickening or air fluid levels identified.    Mastoid air cells:The visualized mastoid air cells appear clear.    Brain:Intracranial findings discussed separately.  Impression: Impression:    1. There is moderate soft tissue swelling in the right periorbital and preseptal spaces. There is internal hyperdensity within the swelling which is consistent with hematoma. This is consistent with soft tissue contusion with associated hematoma. No underlying bony injury is seen.    2. No acute maxillofacial fracture identified. Details and other findings as noted above.    No  significant discrepancy with overnight report.    Electronically signed by: Cuco Posey  Date:    05/30/2023  Time:    07:37  CT Head Without Contrast  Narrative: Technique:CT of the head was performed without intravenous contrast with axial as well as coronal and sagittal images.    Comparison:None.    Dosage Information:Automated exposure control was utilized.  DLP 1337    Clinical history:Fall, facial injury.    Findings:    Hemorrhage:No acute intracranial hemorrhage is seen.    CSF spaces:The ventricles, sulci and basal cisterns all appear mildly prominent consistent with global cerebral atrophy.    Brain parenchyma:There is preservation of the grey white junction throughout. Mild to moderate microvascular change is seen in portions of the periventricular and deep white matter tracts.    Cerebellum:Unremarkable..    Calvarium:There is a focal lytic lesion in the left sphenoid wing on series 4 image 44. No acute linear or depressed skull fracture is seen.    Scalp:Minimal scalp soft tissue swelling is seen along the left parietal bone. The swelling is centered on image 20, Series 3. This consistent with a scalp contusion. No underlying bone injury is identified.    Maxillofacial Structures:Maxillofacial findings discussed separately in the maxillofacial CT report.  Impression: Impression:    1. Minimal scalp soft tissue swelling is seen along the left parietal bone. This consistent with a scalp contusion. No underlying bone injury is identified.    2. There is a focal lytic lesion in the left sphenoid wing on series 4 image 44. Correlate clinically as regards additional evaluation and follow-up.    3. No acute intracranial traumatic injury identified. Details and other findings as noted above.    No significant discrepancy with overnight report.    Electronically signed by: Cuco Posey  Date:    05/30/2023  Time:    07:35      German Boateng MD   06/05/2023

## 2023-06-06 VITALS
WEIGHT: 160.5 LBS | HEART RATE: 75 BPM | TEMPERATURE: 98 F | HEIGHT: 62 IN | BODY MASS INDEX: 29.53 KG/M2 | DIASTOLIC BLOOD PRESSURE: 64 MMHG | OXYGEN SATURATION: 96 % | RESPIRATION RATE: 18 BRPM | SYSTOLIC BLOOD PRESSURE: 121 MMHG

## 2023-06-06 PROBLEM — G93.40 ACUTE ENCEPHALOPATHY: Status: ACTIVE | Noted: 2023-06-06

## 2023-06-06 LAB — SARS-COV-2 RDRP RESP QL NAA+PROBE: NEGATIVE

## 2023-06-06 PROCEDURE — 25000003 PHARM REV CODE 250: Performed by: INTERNAL MEDICINE

## 2023-06-06 PROCEDURE — 97530 THERAPEUTIC ACTIVITIES: CPT

## 2023-06-06 PROCEDURE — 97116 GAIT TRAINING THERAPY: CPT | Mod: CQ

## 2023-06-06 PROCEDURE — 87635 SARS-COV-2 COVID-19 AMP PRB: CPT | Performed by: INTERNAL MEDICINE

## 2023-06-06 PROCEDURE — 25000003 PHARM REV CODE 250: Performed by: NURSE PRACTITIONER

## 2023-06-06 RX ORDER — HYDRALAZINE HYDROCHLORIDE 100 MG/1
100 TABLET, FILM COATED ORAL EVERY 8 HOURS
Qty: 90 TABLET | Refills: 11 | Status: SHIPPED | OUTPATIENT
Start: 2023-06-06 | End: 2024-06-05

## 2023-06-06 RX ORDER — QUETIAPINE FUMARATE 50 MG/1
50 TABLET, FILM COATED ORAL NIGHTLY
Qty: 30 TABLET | Refills: 11 | Status: SHIPPED | OUTPATIENT
Start: 2023-06-06 | End: 2024-06-05

## 2023-06-06 RX ADMIN — RIVASTIGMINE TARTRATE 3 MG: 1.5 CAPSULE ORAL at 09:06

## 2023-06-06 RX ADMIN — ATORVASTATIN CALCIUM 40 MG: 40 TABLET, FILM COATED ORAL at 09:06

## 2023-06-06 RX ADMIN — DICLOFENAC SODIUM 2 G: 10 GEL TOPICAL at 09:06

## 2023-06-06 RX ADMIN — HYDRALAZINE HYDROCHLORIDE 100 MG: 50 TABLET, FILM COATED ORAL at 07:06

## 2023-06-06 NOTE — PLAN OF CARE
Problem: Adult Inpatient Plan of Care  Goal: Plan of Care Review  Outcome: Met  Goal: Patient-Specific Goal (Individualized)  Outcome: Met  Goal: Absence of Hospital-Acquired Illness or Injury  Outcome: Met  Goal: Optimal Comfort and Wellbeing  Outcome: Met  Goal: Readiness for Transition of Care  Outcome: Met     Problem: Skin Injury Risk Increased  Goal: Skin Health and Integrity  Outcome: Met     Problem: Impaired Wound Healing  Goal: Optimal Wound Healing  Outcome: Met

## 2023-06-06 NOTE — PLAN OF CARE
Patient will be transported by [facility] Phone: [phone] via [place]. ETA: 130 PM.

## 2023-06-06 NOTE — PLAN OF CARE
Problem: Adult Inpatient Plan of Care  Goal: Plan of Care Review  Outcome: Ongoing, Progressing  Flowsheets (Taken 6/5/2023 2014)  Plan of Care Reviewed With:   patient   family  Goal: Patient-Specific Goal (Individualized)  Outcome: Ongoing, Progressing  Goal: Absence of Hospital-Acquired Illness or Injury  Outcome: Ongoing, Progressing  Intervention: Identify and Manage Fall Risk  Flowsheets (Taken 6/5/2023 2014)  Safety Promotion/Fall Prevention:   assistive device/personal item within reach   medications reviewed   nonskid shoes/socks when out of bed   side rails raised x 2  Intervention: Prevent Skin Injury  Flowsheets (Taken 6/5/2023 2014)  Body Position: position changed independently  Skin Protection:   adhesive use limited   incontinence pads utilized   tubing/devices free from skin contact  Intervention: Prevent and Manage VTE (Venous Thromboembolism) Risk  Flowsheets (Taken 6/5/2023 2014)  Activity Management:   Ambulated in room - L4   Up in chair - L3  VTE Prevention/Management:   ambulation promoted   bleeding risk assessed   ROM (active) performed  Range of Motion:   active ROM (range of motion) encouraged   ROM (range of motion) performed  Intervention: Prevent Infection  Flowsheets (Taken 6/5/2023 2014)  Infection Prevention:   rest/sleep promoted   single patient room provided  Goal: Optimal Comfort and Wellbeing  Outcome: Ongoing, Progressing  Intervention: Monitor Pain and Promote Comfort  Flowsheets (Taken 6/5/2023 2014)  Pain Management Interventions:   care clustered   medication offered   pain management plan reviewed with patient/caregiver   pillow support provided   position adjusted  Intervention: Provide Person-Centered Care  Flowsheets (Taken 6/5/2023 2014)  Trust Relationship/Rapport: care explained  Goal: Readiness for Transition of Care  Outcome: Ongoing, Progressing     Problem: Skin Injury Risk Increased  Goal: Skin Health and Integrity  Outcome: Ongoing,  Progressing  Intervention: Optimize Skin Protection  Flowsheets (Taken 6/5/2023 2014)  Pressure Reduction Techniques:   frequent weight shift encouraged   weight shift assistance provided  Skin Protection:   adhesive use limited   incontinence pads utilized   tubing/devices free from skin contact  Head of Bed (HOB) Positioning: HOB at 30-45 degrees  Intervention: Promote and Optimize Oral Intake  Flowsheets (Taken 6/5/2023 2014)  Oral Nutrition Promotion: rest periods promoted     Problem: Impaired Wound Healing  Goal: Optimal Wound Healing  Outcome: Ongoing, Progressing  Intervention: Promote Wound Healing  Flowsheets (Taken 6/5/2023 2014)  Oral Nutrition Promotion: rest periods promoted  Activity Management:   Ambulated in room - L4   Up in chair - L3  Pain Management Interventions:   care clustered   medication offered   pain management plan reviewed with patient/caregiver   pillow support provided   position adjusted

## 2023-06-06 NOTE — PT/OT/SLP PROGRESS
Physical Therapy Treatment    Patient Name:  Kathleen Arevalo   MRN:  [id number]    Recommendations:     Discharge Recommendations: nursing facility, basic  Discharge Equipment Recommendations: to be determined by next level of care  Barriers to discharge: Decreased caregiver support and impaired cognition    Assessment:     Kathleen Arevalo is a 77 y.o. female admitted with a medical diagnosis of  AMS. Per chart, patient was found by police wandering around neighborhood. Right eye swollen from fall that occurred on Sunday. She has history of dementia. She presents with the following impairments/functional limitations: weakness, gait instability, impaired balance, decreased safety awareness .  Pt required increased time for all activities and heavy vcs to stay on task.     Rehab Prognosis: Good; patient would benefit from acute skilled PT services to address these deficits and reach maximum level of function.    Recent Surgery: * No surgery found *      Plan:     During this hospitalization, patient to be seen 3 x/week to address the identified rehab impairments via gait training, therapeutic activities, therapeutic exercises, neuromuscular re-education and progress toward the following goals:    Plan of Care Expires:  06/30/23    Subjective     Chief Complaint:   Patient/Family Comments/goals:   Pain/Comfort:  Location - Side 1: Bilateral  Location 1: knee  Pain Addressed 1: Reposition, Distraction, Cessation of Activity      Objective:     Communicated with NSG prior to session.  Patient found HOB elevated upon PT entry to room.     General Precautions: Standard, fall  Orthopedic Precautions: N/A  Braces: N/A  Respiratory Status: Room air  Blood Pressure:   Skin Integrity: Visible skin intact      Functional Mobility:  Bed Mobility:     Scooting: stand by assistance  Supine to Sit: stand by assistance  Sit to Supine: stand by assistance  Transfers:     Sit to Stand:  contact guard assistance with rolling walker and 1 trial  from EOB  Gait: pt amb 40ft/28ft with RW Veronique. Pt required vcs to stay close to RW to ensure safety. Pt easily distracted.       Patient left HOB elevated with call button in reach and  present..    GOALS:   Multidisciplinary Problems       Physical Therapy Goals          Problem: Physical Therapy    Goal Priority Disciplines Outcome Goal Variances Interventions   Physical Therapy Goal     PT, PT/OT Ongoing, Progressing     Description: Goals to be met by: 23     Patient will increase functional independence with mobility by performin. Sit to stand transfer with Modified [place]  2. Gait  x 300 feet with Modified [place] using Rolling Walker.                          Time Tracking:     PT Received On: 23  PT Start Time: 1116     PT Stop Time: 1130  PT Total Time (min): 14 min     Billable Minutes: Gait Training 14    Treatment Type: Treatment  PT/PTA: PTA     Number of PTA visits since last PT visit: 3     2023

## 2023-06-06 NOTE — DISCHARGE SUMMARY
[facility]  Hospital Medicine Discharge Summary    Admit Date: 5/30/2023  Discharge Date and Time: 6/6/20231:31 PM  Admitting Physician:  Team  Discharging Physician: German Boateng MD.  Primary Care Physician: Primary Doctor No  Consults: Hospital Medicine    Discharge Diagnoses:  Acute metabolic encephalopathy d/t uti, treated on chronic with advancing dementia   E coli UTI - s/p treated   Essential hypertension, controlled  Right periorbital swelling/hematoma  SUSANNA, resolved   1 Focal lytic lesion in the left sphenoid wing  Hyperlipidemia  Dementia with behavioral disturbances        Hospital Course:   A. History:   History is very limited given patient is a poor historian.  Most of the information was gathered from hospital chart, medical records and ED staff  This is a 77-year-old female with a known past medical history of essential hypertension, hyperlipidemia, dementia who was brought in last night on 05/30/2023 where EMS per police for altered mental status.  Patient was found wandering around in a neighborhood.  The police was able to get her back to her home but it was locked and she was unable to get in.  Please also noted bruising and swelling of her right eye.  She reported to police she fell Sunday.  Today on our questioning she said she fell yesterday.  To the ED the she told them she fell a month ago.  Currently she is denying any complaints.  She said off and on she hurts.  She mentioned that she was going to drop her mom off to the , pleasantly confused.  She asked if she can stay in the hospital.  Informed her we are admitting her and treating her urinary tract infection and will take care of her.  She smiled and closed her eyes  Reviewed patient's labs, she has a mild SUSANNA with creatinine of 1.4, when compared to her last labs in October 2022 when creatinine was normal  On CT of her head, it shows minimal scalp soft tissue swelling along the left parietal  bone consistent with scalp contusion.  Focal lytic lesion in the left sphenoid wing  CT maxillofacial shows moderate swelling in the right periorbital and norberto septal space.  There is internal hyperdensity within the swelling which is consistent with hematoma.  No underlying bony injury seen  Ordered XR metastatic survey no urinalysis did not show proteinuria  UA does shows UTI, urine cultures in process        Patient currently being managed for acute on chronic encephalopathy, acute UTI, right periorbital edema with hematoma.    Overnight ophthalmology evaluation was conducted - nothing to do to follow-up with the outpatient ophthalmologist   X-ray metastatic survey was negative   Urine cultures growing e coli   Patient received IV and oral antibiotics for a week 5 day course for pansensitive EE coli UTI.    She is currently on Seroquel 50 mg at bedtime to help with behavioral disturbances of dementia   Patient has a NOAC for wandering around, monitor closely        Physical exam:  Vitals reviewed.  General:  Right periorbital edema, eyes is closed.  Black eye, Pleasantly confused, awake   Chest: Clear to auscultation bilaterally anteriorly.    Heart: S1, S2, no appreciable murmur  Abdomen: Soft, nontender, BS +, abdominal binder on  MSK: Warm, 1+ pitting edema bilateral lower extremity, positive ankle swelling as well bilaterally  Neurologic:  Awake but unable to assess orientation.  Unable to answer most of the questions.  Able to move all 4 extremities         Pt was seen and examined on the day of discharge  Vitals:  VITAL SIGNS: 24 HRS MIN & MAX LAST   Temp  Min: 97.1 °F (36.2 °C)  Max: 98.6 °F (37 °C) 98.2 °F (36.8 °C)   BP  Min: 110/64  Max: 183/72 121/64   Pulse  Min: 62  Max: 81  75   Resp  Min: 18  Max: 18 18   SpO2  Min: 96 %  Max: 100 % 96 %         Procedures Performed: No admission procedures for hospital encounter.     Significant Diagnostic Studies: See Full reports for all details    Recent Labs    Lab 05/31/23  1341   WBC 4.03*   RBC 3.74*   HGB 12.0   HCT 35.7*   MCV 95.5*   MCH 32.1*   MCHC 33.6   RDW 13.6      MPV 9.0       Recent Labs   Lab 05/31/23  1341      K 4.1   CO2 29   BUN 16.8   CREATININE 0.70   CALCIUM 8.4   ALBUMIN 3.2*   ALKPHOS 95   ALT 14   AST 21   BILITOT 0.2        Microbiology Results (last 7 days)       Procedure Component Value Units Date/Time    Urine culture [[id number]]  (Abnormal)  (Susceptibility) Collected: 05/30/23 0346    Order Status: Completed Specimen: Urine Updated: 06/02/23 0659     Urine Culture >/= 100,000 colonies/ml Escherichia coli             XR Metastatic Survey  EXAMINATION  XR METASTATIC SURVEY    CLINICAL HISTORY  1 Lytic lesion on CT, poor historiam, no family available.;    TECHNIQUE  A total of 22 images of the axial and appendicular skeletal structures were obtained for purposes of metastatic survey.    COMPARISON  None available at the time of initial interpretation.    FINDINGS  The visualized osseous structures are without convincing expansile, lytic, or sclerotic lesion. No findings to suggest focal bone disease are appreciated. The trabecular pattern is unremarkable.    There is no acute vertebral column malalignment, cortical displacement, or articular subluxation.  Regional degenerative changes are noted.  No skeletal asymmetry is appreciated.    The included soft tissues are without convincing acute abnormality.  Scattered vascular calcification is present.  No focal abnormality of the visualized intrathoracic and abdominopelvic regions is appreciated.    IMPRESSION  1. No convincing destructive skeletal lesion by radiographic assessment.  2. Chronic secondary details discussed above.    Electronically signed by: Amilcar Stern  Date:    05/30/2023  Time:    13:42  CT Cervical Spine Without Contrast  Narrative: Technique:CT of the cervical spine was performed without intravenous contrast with axial as well as sagittal and coronal  images.    Comparison:None.    Dosage Information:Automated exposure control was utilized.      Clinical history:Fall, facial injury.    Findings:    Lung apices:The visualized lung apices appear unremarkable.    Mineralization:Mild osteopenia is seen in the visualized bony structures of the cervical spine.    Rotation:Leftward rotation of C1 with respect to C2 is seen.    Scoliosis:No significant scoliosis is seen.    Vertebral Fusion:No vertebral fusion is identified.    Listhesis:No significant listhesis is identified.    Lordosis:Degenerative straightening of the cervical lordosis is seen.    Intervertebral disc spaces:Multilevel loss of disc height is seen.    Osteophytes:Multilevel endplate osteophytes are seen.    Endplate Sclerosis:Multilevel endplate sclerosis is seen.    Uncovertebral degenerative changes:Multilevel uncovertebral joint arthrosis is seen.    Facet degenerative changes:Multilevel facet degenerative changes are seen.    Fractures:No acute cervical spine fracture dislocation or subluxation is seen.    This exam does not exclude the possibility of intrathecal soft tissue, ligamentous or vascular injury.  Impression: Impression:    1. No acute cervical spine fracture dislocation or subluxation is seen.    2. Degenerative changes and other details as above.    No significant discrepancy with overnight report.    Electronically signed by: Cuco Posey  Date:    05/30/2023  Time:    07:40  CT Maxillofacial Without Contrast  Narrative: Technique:Noncontrast maxillofacial CT was performed with axial as well as sagittal and coronal images being submitted for interpretation.    Automated exposure control was utilized to minimize radiation dose     Comparison:None.    Clinical history:Fall, facial injury.    Findings:    Orbital soft tissues:There is moderate soft tissue swelling in the right periorbital and preseptal spaces. There is internal hyperdensity within the swelling which is  consistent with hematoma. This is consistent with soft tissue contusion with associated hematoma. No underlying bony injury is seen.    preseptal soft tissues:The left preseptal soft tissues appear unremarkable.    post- septal soft tissues:The bilateral extraconal soft tissues appear unremarkable. The bilateral intraconal soft tissues appear unremarkable.    Bones:    Orbital bony structures:The bilateral orbital bony structures are intact with no orbital fracture identified.    Mandible:The mandible appears unremarkable.    Maxilla:The maxilla appears unremarkable.    Pterygoid plates:No fracture identified of the right or left pterygoid plates.    Zygoma:The zygomatic arches are intact.    TMJ:The mandibular condyles appear normally placed with respect to the mandibular fossa.    Nasal Bones:The nasal septum is midline.    Skull:No acute linear or depressed fracture is identified in the visualized skull.    Paranasal sinuses:The visualized paranasal sinuses appear clear with no mucoperiosteal thickening or air fluid levels identified.    Mastoid air cells:The visualized mastoid air cells appear clear.    Brain:Intracranial findings discussed separately.  Impression: Impression:    1. There is moderate soft tissue swelling in the right periorbital and preseptal spaces. There is internal hyperdensity within the swelling which is consistent with hematoma. This is consistent with soft tissue contusion with associated hematoma. No underlying bony injury is seen.    2. No acute maxillofacial fracture identified. Details and other findings as noted above.    No significant discrepancy with overnight report.    Electronically signed by: Cuco Posey  Date:    05/30/2023  Time:    07:37  CT Head Without Contrast  Narrative: Technique:CT of the head was performed without intravenous contrast with axial as well as coronal and sagittal images.    Comparison:None.    Dosage Information:Automated exposure control was utilized.   DLP 1337    Clinical history:Fall, facial injury.    Findings:    Hemorrhage:No acute intracranial hemorrhage is seen.    CSF spaces:The ventricles, sulci and basal cisterns all appear mildly prominent consistent with global cerebral atrophy.    Brain parenchyma:There is preservation of the grey white junction throughout. Mild to moderate microvascular change is seen in portions of the periventricular and deep white matter tracts.    Cerebellum:Unremarkable..    Calvarium:There is a focal lytic lesion in the left sphenoid wing on series 4 image 44. No acute linear or depressed skull fracture is seen.    Scalp:Minimal scalp soft tissue swelling is seen along the left parietal bone. The swelling is centered on image 20, Series 3. This consistent with a scalp contusion. No underlying bone injury is identified.    Maxillofacial Structures:Maxillofacial findings discussed separately in the maxillofacial CT report.  Impression: Impression:    1. Minimal scalp soft tissue swelling is seen along the left parietal bone. This consistent with a scalp contusion. No underlying bone injury is identified.    2. There is a focal lytic lesion in the left sphenoid wing on series 4 image 44. Correlate clinically as regards additional evaluation and follow-up.    3. No acute intracranial traumatic injury identified. Details and other findings as noted above.    No significant discrepancy with overnight report.    Electronically signed by: Cuco Posey  Date:    05/30/2023  Time:    07:35         Medication List        START taking these medications      hydrALAZINE 100 MG tablet  Commonly known as: APRESOLINE  Take 1 tablet (100 mg total) by mouth every 8 (eight) hours.            CHANGE how you take these medications      diclofenac sodium 1 % Gel  Commonly known as: VOLTAREN  Apply 2 g topically every 6 (six) hours as needed.  What changed: Another medication with the same name was removed. Continue taking this medication, and  "follow the directions you see here.     QUEtiapine 50 MG tablet  Commonly known as: SEROQUEL  Take 1 tablet (50 mg total) by mouth every evening.  What changed:   medication strength  how much to take  when to take this  reasons to take this     rivastigmine tartrate 3 MG capsule  Commonly known as: EXELON  Take 1 capsule (3 mg total) by mouth 2 (two) times daily.  What changed: how much to take            CONTINUE taking these medications      atorvastatin 40 MG tablet  Commonly known as: LIPITOR  Take 1 tablet (40 mg total) by mouth once daily.     diaper,brief,adult,disposable Misc  1 each by Misc.(Non-Drug; Combo Route) route 2 (two) times a day.     latanoprost 0.005 % ophthalmic solution  Place 1 drop into both eyes every evening. Place 1 drop into both eyes every evening.     oxybutynin 5 MG Tab  Commonly known as: DITROPAN  Take 1 tablet (5 mg total) by mouth once daily.     underpads 30 X 36 " Pads  1 each by Misc.(Non-Drug; Combo Route) route every evening.            STOP taking these medications      amLODIPine 10 MG tablet  Commonly known as: NORVASC     losartan 25 MG tablet  Commonly known as: COZAAR     meloxicam 7.5 MG tablet  Commonly known as: MOBIC     spironolactone 25 MG tablet  Commonly known as: ALDACTONE     traZODone 50 MG tablet  Commonly known as: DESYREL               Where to Get Your Medications        These medications were sent to [id number] - [place] - [address]  [address] [address]      Phone: [phone]   hydrALAZINE 100 MG tablet  QUEtiapine 50 MG tablet          Explained in detail to the patient about the discharge plan, medications, and follow-up visits. Pt understands and agrees with the treatment plan  Discharge Disposition: Home or Self Care   Discharged Condition: stable  Diet-   Dietary Orders (From admission, onward)       Start     Ordered    05/30/23 0926  Diet heart healthy  Diet effective now         " 05/30/23 0925                   Medications Per DC med rec  Activities as tolerated   Follow-up Information       Primary Doctor No Follow up.    Why: needs a pcp f/up in 2-3 weeks                         For further questions contact hospitalist office    Discharge time 33 minutes    For worsening symptoms, chest pain, shortness of breath, increased abdominal pain, high grade fever, stroke or stroke like symptoms, immediately go to the nearest Emergency Room or call 911 as soon as possible.      German Mckeon M.D on 6/6/2023. at 1:31 PM.

## 2023-06-06 NOTE — PT/OT/SLP PROGRESS
Occupational Therapy   Treatment    Name: Kathleen Arevalo  MRN: [id number]  Admitting Diagnosis:  Acute encephalopathy       Recommendations:     Discharge Recommendations: nursing facility, basic  Discharge Equipment Recommendations:  to be determined by next level of care  Barriers to discharge:  Decreased caregiver support    Assessment:     Kathleen Arevalo is a 77 y.o. female with a medical diagnosis of AMS.  She presents pleasantly confused, with poor memory, recall and insight, able to ambulate with cues and CGA, . Performance deficits affecting function are weakness, gait instability, impaired balance, decreased safety awareness, impaired cognition, impaired self care skills, impaired functional mobility.     Rehab Prognosis:  Good; patient would benefit from acute skilled OT services to address these deficits and reach maximum level of function.       Plan:     Patient to be seen 3 x/week to address the above listed problems via self-care/home management, therapeutic activities, therapeutic exercises  Plan of Care Expires: 06/28/23  Plan of Care Reviewed with: patient    Subjective     Pain/Comfort:  Pain Rating 1:  (c/o some R knee pain, no number given)    Objective:     Communicated with: nsg and PtA prior to session.  Patient found supine with upon OT entry to room.    General Precautions: Standard, fall    Orthopedic Precautions:   Braces:    Respiratory Status: Room air  Vital Signs:      Occupational Performance:     Bed Mobility:         Functional Mobility/Transfers:  Sit to stand with CGA  Functional Mobility: ambulated with CGA/cues    Activities of Daily Living:  UB dress with cues/setup assist  Socks setup  Therapeutic Activities:as above      Therapeutic Exercise:      AMPAC 6 Click ADL:      Patient Education:  Patient provided with verbal education regarding OT role/goals/POC, fall prevention, and safety awareness.  Additional teaching is warranted.      Patient left supine with call button in reach  and son present.     GOALS:   Multidisciplinary Problems       Occupational Therapy Goals          Problem: Occupational Therapy    Goal Priority Disciplines Outcome Interventions   Occupational Therapy Goal     OT, PT/OT Ongoing, Progressing    Description: Goals to be met by: 6/28/23     Patient will increase functional independence with ADLs by performing:    UE Dressing with Stand-by Assistance.  LE Dressing with Stand-by Assistance.  Grooming while standing with Stand-by Assistance.  Toileting from toilet with Stand-by Assistance for hygiene and clothing management.   Toilet transfer to toilet with Stand-by Assistance.                         Time Tracking:     OT Date of Treatment: 06/06/23  OT Start Time: 1116  OT Stop Time: 1130  OT Total Time (min): 14 min    Billable Minutes:Self Care/Home Management 14 min    OT/MEHDI: OT     Number of MEHDI visits since last OT visit: 3    6/6/2023

## 2023-06-06 NOTE — NURSING
Called nursing report at [facility], spoke with Jamie the Director of nursing, gave report to him. Called approx 1245 pm.     Number called [phone].

## 2023-06-06 NOTE — PLAN OF CARE
Problem: Adult Inpatient Plan of Care  Goal: Plan of Care Review  Outcome: Ongoing, Progressing  Flowsheets (Taken 6/5/2023 2321)  Plan of Care Reviewed With:   patient   family  Goal: Patient-Specific Goal (Individualized)  Outcome: Ongoing, Progressing  Goal: Absence of Hospital-Acquired Illness or Injury  Outcome: Ongoing, Progressing  Intervention: Identify and Manage Fall Risk  Flowsheets (Taken 6/5/2023 2321)  Safety Promotion/Fall Prevention:   assistive device/personal item within reach   Fall Risk reviewed with patient/family   Fall Risk signage in place   family to remain at bedside   lighting adjusted   medications reviewed   nonskid shoes/socks when out of bed   side rails raised x 2   /camera at bedside  Intervention: Prevent Skin Injury  Flowsheets (Taken 6/5/2023 2321)  Body Position:   supine   upper extremity elevated   position changed independently  Skin Protection:   adhesive use limited   protective footwear used   tubing/devices free from skin contact  Intervention: Prevent and Manage VTE (Venous Thromboembolism) Risk  Flowsheets (Taken 6/5/2023 2321)  Activity Management:   Up in chair - L3   Ambulated in room - L4  VTE Prevention/Management:   ambulation promoted   bleeding precations maintained   bleeding risk assessed  Intervention: Prevent Infection  Flowsheets (Taken 6/5/2023 2321)  Infection Prevention:   environmental surveillance performed   hand hygiene promoted   rest/sleep promoted   single patient room provided   equipment surfaces disinfected  Goal: Optimal Comfort and Wellbeing  Outcome: Ongoing, Progressing  Intervention: Monitor Pain and Promote Comfort  Flowsheets (Taken 6/5/2023 2321)  Pain Management Interventions:   care clustered   quiet environment facilitated   pain management plan reviewed with patient/caregiver  Intervention: Provide Person-Centered Care  Flowsheets (Taken 6/5/2023 2321)  Trust Relationship/Rapport:   care explained   emotional  support provided   empathic listening provided   thoughts/feelings acknowledged   questions answered  Goal: Readiness for Transition of Care  Outcome: Ongoing, Progressing     Problem: Skin Injury Risk Increased  Goal: Skin Health and Integrity  Outcome: Ongoing, Progressing  Intervention: Optimize Skin Protection  Flowsheets (Taken 6/5/2023 0148)  Pressure Reduction Techniques:   weight shift assistance provided   frequent weight shift encouraged  Skin Protection:   adhesive use limited   protective footwear used   tubing/devices free from skin contact  Head of Bed (HOB) Positioning: HOB at 20-30 degrees     Problem: Impaired Wound Healing  Goal: Optimal Wound Healing  Outcome: Ongoing, Progressing

## 2023-06-24 ENCOUNTER — HOSPITAL ENCOUNTER (EMERGENCY)
Facility: HOSPITAL | Age: 78
Discharge: HOME | End: 2023-06-24
Attending: EMERGENCY MEDICINE
Payer: MEDICARE

## 2023-06-24 VITALS
HEIGHT: 60 IN | OXYGEN SATURATION: 96 % | BODY MASS INDEX: 29.45 KG/M2 | HEART RATE: 84 BPM | DIASTOLIC BLOOD PRESSURE: 52 MMHG | RESPIRATION RATE: 18 BRPM | SYSTOLIC BLOOD PRESSURE: 127 MMHG | TEMPERATURE: 98 F | WEIGHT: 150 LBS

## 2023-06-24 DIAGNOSIS — G89.29 CHRONIC PAIN OF RIGHT KNEE: ICD-10-CM

## 2023-06-24 DIAGNOSIS — M79.604 RIGHT LEG PAIN: Primary | ICD-10-CM

## 2023-06-24 DIAGNOSIS — M25.561 CHRONIC PAIN OF RIGHT KNEE: ICD-10-CM

## 2023-06-24 DIAGNOSIS — R52 PAIN: ICD-10-CM

## 2023-06-24 DIAGNOSIS — M17.11 ARTHRITIS OF RIGHT KNEE: ICD-10-CM

## 2023-06-24 PROCEDURE — 99284 EMERGENCY DEPT VISIT MOD MDM: CPT | Mod: 25

## 2023-06-24 NOTE — ED NOTES
Called [facility], spoke to Zhen, nurse. Gave report to Zhen, who states he will set up transport with [facility] to bring pt back.

## 2023-06-24 NOTE — ED PROVIDER NOTES
Encounter Date: 6/24/2023       History     Chief Complaint   Patient presents with    Leg Pain     Right leg pain and swelling had imaging done at nursing home showed osteoarthritis.  Pt GCS14 normal for pt.  Unknown if pt hurt foot or not.  Ems states pt just got back from psych facility yesterday.      77-year-old female with history of dementia, hypertension, anxiety presents via EMS for right knee pain. 6/7/2023 she was evaluated at [facility] for aggressive behavior. On arrival she is awake and alert. GCSP 14 which is normal for patient. She is complaining of right knee, and right leg pain.      Leg Pain   There was no injury mechanism. The pain is present in the right knee. The quality of the pain is described as aching. The pain has been Constant since onset. Pertinent negatives include no numbness and no tingling. She reports no foreign bodies present.   Review of patient's allergies indicates:  No Known Allergies  Past Medical History:   Diagnosis Date    Cellulitis     Hypertension      Past Surgical History:   Procedure Laterality Date    HYSTERECTOMY       Family History   Problem Relation Age of Onset    Obesity Mother     Heart disease Father     Obesity Father     Dementia Maternal Aunt     Aneurysm Neg Hx     Cancer Neg Hx     Clotting disorder Neg Hx     Diabetes Neg Hx     Fainting Neg Hx     Hyperlipidemia Neg Hx     Kidney disease Neg Hx     Liver disease Neg Hx     Migraines Neg Hx     Neuropathy Neg Hx     Parkinsonism Neg Hx     Seizures Neg Hx     Stroke Neg Hx     Tremor Neg Hx      Social History     Tobacco Use    Smoking status: Former     Passive exposure: Never    Smokeless tobacco: Never   Substance Use Topics    Alcohol use: Not Currently     Comment: occ    Drug use: Not Currently     Review of Systems   Constitutional:  Negative for fever.   HENT:  Negative for sore throat.    Respiratory:  Negative for shortness of breath.    Cardiovascular:  Negative for chest pain.    Gastrointestinal:  Negative for nausea.   Genitourinary:  Negative for dysuria.   Musculoskeletal:  Negative for back pain.        Right knee pain, right leg pain   Skin:  Negative for rash.   Neurological:  Negative for tingling, weakness and numbness.   Hematological:  Does not bruise/bleed easily.     Physical Exam     Initial Vitals [06/24/23 1640]   BP Pulse Resp Temp SpO2   119/70 76 18 97.6 °F (36.4 °C) 98 %      MAP       --         Physical Exam    Vitals reviewed.  Constitutional: She appears well-developed.   Cardiovascular:  Normal rate.           Pulmonary/Chest: Breath sounds normal.   Musculoskeletal:      Right knee: Crepitus present. No swelling. Normal range of motion. Tenderness present over the LCL.      Instability Tests: Anterior drawer test negative.      Right lower leg: Swelling and tenderness present. No edema.      Right ankle: Swelling present. Tenderness present.      Right foot: Normal capillary refill. Normal pulse.     Neurological: She is alert and oriented to person, place, and time. She has normal strength.   Skin: Skin is warm.   Psychiatric: Her behavior is normal. Judgment and thought content normal.       ED Course   Procedures  Labs Reviewed - No data to display       Imaging Results              X-Ray Knee 1 or 2 View Right (In process)                      Medications - No data to display  Medical Decision Making:   Initial Assessment:   77-year-old female with history of dementia, hypertension, anxiety presents via EMS for right knee pain. 6/7/2023 she was evaluated at [facility] for aggressive behavior. On arrival she is awake and alert. GCSP 14 which is normal for patient. She is complaining of right knee, and right leg pain  Differential Diagnosis:   Cellulitis, abscess, DVT, vascular insufficiency, bursitis, septic arthritis, inflammatory arthritis, contusion, abrasion, laceration, hematoma, dermatitis   Clinical Tests:   Radiological Study: Reviewed  ED  Management:  Review x-ray positive for right knee arthritis.  Right lower extremity NIVA no indications of right lower extremity DVT    The patient is resting comfortably in no acute distress.  She is hemodynamically stable and is without objective evidence for acute process requiring urgent intervention or hospitalization. I provided counseling to patient with regard to condition, the treatment plan, specific conditions for return, and the importance of follow up. Detailed written and verbal instructions provided to patient and he expressed a verbal understanding of the discharge instructions and overall management plan. The patient is stable for discharge.                              Clinical Impression:   Final diagnoses:  [R52] Pain  [M79.604] Right leg pain (Primary)  [M25.561, G89.29] Chronic pain of right knee  [M17.11] Arthritis of right knee        ED Disposition Condition    Discharge Stable          ED Prescriptions    None       Follow-up Information       Follow up With Specialties Details Why Contact Info    [facility] - Emergency Dept Emergency Medicine  If symptoms worsen [address] [facility] [address]  [phone]             SANG Barahona  06/24/23 9031

## 2023-07-16 ENCOUNTER — HOSPITAL ENCOUNTER (EMERGENCY)
Facility: HOSPITAL | Age: 78
Discharge: HOME | End: 2023-07-16
Attending: EMERGENCY MEDICINE
Payer: MEDICARE

## 2023-07-16 VITALS
SYSTOLIC BLOOD PRESSURE: 165 MMHG | DIASTOLIC BLOOD PRESSURE: 96 MMHG | TEMPERATURE: 99 F | BODY MASS INDEX: 35.15 KG/M2 | OXYGEN SATURATION: 99 % | RESPIRATION RATE: 17 BRPM | WEIGHT: 180 LBS | HEART RATE: 80 BPM

## 2023-07-16 DIAGNOSIS — R50.9 FEVER, UNSPECIFIED FEVER CAUSE: Primary | ICD-10-CM

## 2023-07-16 DIAGNOSIS — R50.9 FEVER: ICD-10-CM

## 2023-07-16 DIAGNOSIS — J18.9 PNEUMONIA DUE TO INFECTIOUS ORGANISM, UNSPECIFIED LATERALITY, UNSPECIFIED PART OF LUNG: ICD-10-CM

## 2023-07-16 LAB
ALBUMIN SERPL-MCNC: 3 G/DL (ref 3.4–4.8)
ALBUMIN/GLOB SERPL: 0.8 RATIO (ref 1.1–2)
ALP SERPL-CCNC: 92 UNIT/L (ref 40–150)
ALT SERPL-CCNC: 13 UNIT/L (ref 0–55)
APPEARANCE UR: ABNORMAL
AST SERPL-CCNC: 19 UNIT/L (ref 5–34)
BACTERIA #/AREA URNS AUTO: NORMAL /HPF
BASOPHILS # BLD AUTO: 0.06 X10(3)/MCL
BASOPHILS NFR BLD AUTO: 0.7 %
BILIRUB UR QL STRIP.AUTO: NEGATIVE
BILIRUBIN DIRECT+TOT PNL SERPL-MCNC: 0.7 MG/DL
BUN SERPL-MCNC: 25.1 MG/DL (ref 9.8–20.1)
CALCIUM SERPL-MCNC: 8.9 MG/DL (ref 8.4–10.2)
CHLORIDE SERPL-SCNC: 106 MMOL/L (ref 98–107)
CO2 SERPL-SCNC: 24 MMOL/L (ref 23–31)
COLOR UR: YELLOW
CREAT SERPL-MCNC: 0.72 MG/DL (ref 0.55–1.02)
EOSINOPHIL # BLD AUTO: 0.01 X10(3)/MCL (ref 0–0.9)
EOSINOPHIL NFR BLD AUTO: 0.1 %
ERYTHROCYTE [DISTWIDTH] IN BLOOD BY AUTOMATED COUNT: 12.7 % (ref 11.5–17)
FLUAV AG UPPER RESP QL IA.RAPID: NOT DETECTED
FLUBV AG UPPER RESP QL IA.RAPID: NOT DETECTED
GFR SERPLBLD CREATININE-BSD FMLA CKD-EPI: >60 MLS/MIN/1.73/M2
GLOBULIN SER-MCNC: 4 GM/DL (ref 2.4–3.5)
GLUCOSE SERPL-MCNC: 124 MG/DL (ref 82–115)
GLUCOSE UR QL STRIP.AUTO: NEGATIVE
HCT VFR BLD AUTO: 33.6 % (ref 37–47)
HGB BLD-MCNC: 11.4 G/DL (ref 12–16)
IMM GRANULOCYTES # BLD AUTO: 0.03 X10(3)/MCL (ref 0–0.04)
IMM GRANULOCYTES NFR BLD AUTO: 0.4 %
KETONES UR QL STRIP.AUTO: NEGATIVE
LACTATE SERPL-SCNC: 1 MMOL/L (ref 0.5–2.2)
LEUKOCYTE ESTERASE UR QL STRIP.AUTO: ABNORMAL
LYMPHOCYTES # BLD AUTO: 1.6 X10(3)/MCL (ref 0.6–4.6)
LYMPHOCYTES NFR BLD AUTO: 19 %
MCH RBC QN AUTO: 30.6 PG (ref 27–31)
MCHC RBC AUTO-ENTMCNC: 33.9 G/DL (ref 33–36)
MCV RBC AUTO: 90.3 FL (ref 80–94)
MONOCYTES # BLD AUTO: 1.07 X10(3)/MCL (ref 0.1–1.3)
MONOCYTES NFR BLD AUTO: 12.7 %
NEUTROPHILS # BLD AUTO: 5.66 X10(3)/MCL (ref 2.1–9.2)
NEUTROPHILS NFR BLD AUTO: 67.1 %
NITRITE UR QL STRIP.AUTO: NEGATIVE
NRBC BLD AUTO-RTO: 0 %
PH UR STRIP.AUTO: 8 [PH]
PLATELET # BLD AUTO: 294 X10(3)/MCL (ref 130–400)
PMV BLD AUTO: 8.8 FL (ref 7.4–10.4)
POCT GLUCOSE: 125 MG/DL (ref 70–110)
POTASSIUM SERPL-SCNC: 4 MMOL/L (ref 3.5–5.1)
PROT SERPL-MCNC: 7 GM/DL (ref 5.8–7.6)
PROT UR QL STRIP.AUTO: ABNORMAL
RBC # BLD AUTO: 3.72 X10(6)/MCL (ref 4.2–5.4)
RBC #/AREA URNS AUTO: <5 /HPF
RBC UR QL AUTO: NEGATIVE
SARS-COV-2 RNA RESP QL NAA+PROBE: NOT DETECTED
SODIUM SERPL-SCNC: 140 MMOL/L (ref 136–145)
SP GR UR STRIP.AUTO: 1.02 (ref 1–1.03)
SQUAMOUS #/AREA URNS AUTO: <5 /HPF
TROPONIN I SERPL-MCNC: <0.01 NG/ML (ref 0–0.04)
UROBILINOGEN UR STRIP-ACNC: 1
WBC # SPEC AUTO: 8.43 X10(3)/MCL (ref 4.5–11.5)
WBC #/AREA URNS AUTO: <5 /HPF

## 2023-07-16 PROCEDURE — 84484 ASSAY OF TROPONIN QUANT: CPT | Performed by: EMERGENCY MEDICINE

## 2023-07-16 PROCEDURE — 0240U COVID/FLU A&B PCR: CPT | Performed by: EMERGENCY MEDICINE

## 2023-07-16 PROCEDURE — 99284 EMERGENCY DEPT VISIT MOD MDM: CPT | Mod: 25

## 2023-07-16 PROCEDURE — 63600175 PHARM REV CODE 636 W HCPCS: Performed by: EMERGENCY MEDICINE

## 2023-07-16 PROCEDURE — 96372 THER/PROPH/DIAG INJ SC/IM: CPT | Performed by: EMERGENCY MEDICINE

## 2023-07-16 PROCEDURE — 81001 URINALYSIS AUTO W/SCOPE: CPT | Performed by: EMERGENCY MEDICINE

## 2023-07-16 PROCEDURE — 85025 COMPLETE CBC W/AUTO DIFF WBC: CPT | Performed by: EMERGENCY MEDICINE

## 2023-07-16 PROCEDURE — 80053 COMPREHEN METABOLIC PANEL: CPT | Performed by: EMERGENCY MEDICINE

## 2023-07-16 PROCEDURE — 96365 THER/PROPH/DIAG IV INF INIT: CPT

## 2023-07-16 PROCEDURE — 87040 BLOOD CULTURE FOR BACTERIA: CPT | Performed by: EMERGENCY MEDICINE

## 2023-07-16 PROCEDURE — 83605 ASSAY OF LACTIC ACID: CPT | Performed by: EMERGENCY MEDICINE

## 2023-07-16 PROCEDURE — 82962 GLUCOSE BLOOD TEST: CPT

## 2023-07-16 RX ORDER — OXCARBAZEPINE 150 MG/1
150 TABLET, FILM COATED ORAL DAILY
COMMUNITY

## 2023-07-16 RX ORDER — ACETAMINOPHEN 325 MG/1
650 TABLET ORAL EVERY 8 HOURS PRN
COMMUNITY

## 2023-07-16 RX ORDER — LORAZEPAM 0.5 MG/1
0.5 TABLET ORAL 2 TIMES DAILY
COMMUNITY

## 2023-07-16 RX ORDER — LEVOFLOXACIN 5 MG/ML
750 INJECTION, SOLUTION INTRAVENOUS
Status: DISCONTINUED | OUTPATIENT
Start: 2023-07-16 | End: 2023-07-16 | Stop reason: HOSPADM

## 2023-07-16 RX ORDER — LEVOFLOXACIN 750 MG/1
750 TABLET ORAL DAILY
Qty: 5 TABLET | Refills: 0 | Status: SHIPPED | OUTPATIENT
Start: 2023-07-16 | End: 2023-07-21

## 2023-07-16 RX ORDER — AMLODIPINE BESYLATE 10 MG/1
10 TABLET ORAL DAILY
COMMUNITY

## 2023-07-16 RX ORDER — CLONAZEPAM 0.5 MG/1
0.5 TABLET ORAL EVERY 6 HOURS PRN
COMMUNITY

## 2023-07-16 RX ORDER — FERROUS SULFATE 325(65) MG
325 TABLET ORAL 3 TIMES DAILY
COMMUNITY

## 2023-07-16 RX ORDER — HALOPERIDOL 5 MG/ML
5 INJECTION INTRAMUSCULAR
Status: COMPLETED | OUTPATIENT
Start: 2023-07-16 | End: 2023-07-16

## 2023-07-16 RX ORDER — HYDROCHLOROTHIAZIDE 25 MG/1
25 TABLET ORAL DAILY
COMMUNITY

## 2023-07-16 RX ORDER — LOSARTAN POTASSIUM 100 MG/1
100 TABLET ORAL DAILY
COMMUNITY

## 2023-07-16 RX ADMIN — LEVOFLOXACIN 750 MG: 750 INJECTION, SOLUTION INTRAVENOUS at 09:07

## 2023-07-16 RX ADMIN — HALOPERIDOL LACTATE 5 MG: 5 INJECTION, SOLUTION INTRAMUSCULAR at 05:07

## 2023-07-16 NOTE — ED NOTES
BIB EMS from outside facility for increased agitation and fever. Pt aggressive towards staff - screaming and flailing limbs. CASSANDRA mental status 2/2 non-cooperative behavior. Afebrile upon arrival.

## 2023-07-16 NOTE — ED NOTES
Roll belt applied for pt's safety. Linens changed, pt put in a new diaper with external urinary catheter in place.

## 2023-07-16 NOTE — ED PROVIDER NOTES
Encounter Date: 7/16/2023       History     Chief Complaint   Patient presents with    Fever     Pt from [facility] dt fever tmax 102. Received tylenol at 0230- pt now 99.5. pt is tearful in triage. Pt denies pain at present or discomforts.when pt asked appropriate neuro questions: pt states she does not want to answer- but thinks she in [place]. Attempted to call NH dt unclear chief complaint- no answer from facility. PMH of Lewy Body Dementia with sun downing, HTN, HLD, osteoarthritis, overactive bladder and glaucoma        This is a [age]-year-old female from [facility] where she sees Dr. Mayo Chen the patient is a full code she is a history of metabolic encephalopathy CKD type 2 diabetes Alzheimer's seizures and liver disease as well as parkinsonian ism and hyperlipidemia hypertension.  The patient was sent to the emergency department today for fever up to 101.4    Review of patient's allergies indicates:  No Known Allergies  Past Medical History:   Diagnosis Date    Cellulitis     Hypertension      Past Surgical History:   Procedure Laterality Date    HYSTERECTOMY       Family History   Problem Relation Age of Onset    Obesity Mother     Heart disease Father     Obesity Father     Dementia Maternal Aunt     Aneurysm Neg Hx     Cancer Neg Hx     Clotting disorder Neg Hx     Diabetes Neg Hx     Fainting Neg Hx     Hyperlipidemia Neg Hx     Kidney disease Neg Hx     Liver disease Neg Hx     Migraines Neg Hx     Neuropathy Neg Hx     Parkinsonism Neg Hx     Seizures Neg Hx     Stroke Neg Hx     Tremor Neg Hx      Social History     Tobacco Use    Smoking status: Former     Passive exposure: Never    Smokeless tobacco: Never   Substance Use Topics    Alcohol use: Not Currently     Comment: occ    Drug use: Not Currently     Review of Systems   Unable to perform ROS: Dementia     Physical Exam     Initial Vitals [07/16/23 0416]   BP Pulse Resp Temp SpO2   (!) 164/82 98 16 99.5 °F (37.5 °C) 97 %       MAP       --         Physical Exam    Constitutional: She appears well-developed and well-nourished.   HENT:   Head: Normocephalic and atraumatic.   Eyes: Pupils are equal, round, and reactive to light.   Cardiovascular:  Normal rate.           Pulmonary/Chest: No respiratory distress. She has no wheezes. She has no rhonchi. She has no rales.   Tachypneic     Abdominal: Abdomen is soft. She exhibits no distension. There is no abdominal tenderness.     Neurological:   Awake, hx of dementia, disoriented   Skin: Skin is warm and dry.   Psychiatric:   Very aggitated at times       ED Course   Procedures  Labs Reviewed   COMPREHENSIVE METABOLIC PANEL - Abnormal; Notable for the following components:       Result Value    Glucose Level 124 (*)     Blood Urea Nitrogen 25.1 (*)     Albumin Level 3.0 (*)     Globulin 4.0 (*)     Albumin/Globulin Ratio 0.8 (*)     All other components within normal limits   URINALYSIS, REFLEX TO URINE CULTURE - Abnormal; Notable for the following components:    Appearance, UA Turbid (*)     Protein, UA Trace (*)     Leukocyte Esterase, UA Trace (*)     All other components within normal limits   CBC WITH DIFFERENTIAL - Abnormal; Notable for the following components:    RBC 3.72 (*)     Hgb 11.4 (*)     Hct 33.6 (*)     All other components within normal limits   POCT GLUCOSE - Abnormal; Notable for the following components:    POCT Glucose 125 (*)     All other components within normal limits   COVID/FLU A&B PCR - Normal    Narrative:     The Xpert Xpress SARS-CoV-2/FLU/RSV plus is a rapid, multiplexed real-time PCR test intended for the simultaneous qualitative detection and differentiation of SARS-CoV-2, Influenza A, Influenza B, and respiratory syncytial virus (RSV) viral RNA in either nasopharyngeal swab or nasal swab specimens.         TROPONIN I - Normal   LACTIC ACID, PLASMA - Normal   URINALYSIS, MICROSCOPIC - Normal   BLOOD CULTURE OLG   BLOOD CULTURE OLG   CBC W/ AUTO DIFFERENTIAL     Narrative:     The following orders were created for panel order CBC auto differential.  Procedure                               Abnormality         Status                     ---------                               -----------         ------                     CBC with Differential[[id number]]        Abnormal            Final result                 Please view results for these tests on the individual orders.          Imaging Results              X-Ray Chest 1 View (Preliminary result)  Result time 07/16/23 09:06:24      Wet Read by Lopez Louis MD (07/16/23 09:06:24, [facility] - Emergency Dept, Emergency Medicine)    ? Lt LL infiltrate                                     Medications   levoFLOXacin 750 mg/150 mL IVPB 750 mg (has no administration in time range)   haloperidol lactate injection 5 mg (5 mg Intramuscular Given 7/16/23 0536)                 ED Course as of 07/16/23 0906   Sun Jul 16, 2023   0904 Sats good, ua neg, cxr ? Lll infiltrate, lactic normal, wbc normal will dc to nh with levaquin [NL]      ED Course User Index  [NL] Lopez Louis MD                 Clinical Impression:   Final diagnoses:  [R50.9] Fever  [R50.9] Fever, unspecified fever cause (Primary)  [J18.9] Pneumonia due to infectious organism, unspecified laterality, unspecified part of lung        ED Disposition Condition    Discharge Stable          ED Prescriptions    None       Follow-up Information    None          Lopez Louis MD  07/16/23 0906

## 2023-07-17 ENCOUNTER — TELEPHONE (OUTPATIENT)
Dept: PRIMARY CARE CLINIC | Facility: CLINIC | Age: 78
End: 2023-07-17
Payer: MEDICARE

## 2023-07-17 NOTE — TELEPHONE ENCOUNTER
"Spoke with Jamie , nursing home director, states " we are not sending patient for visit tomorrow. "   "

## 2023-07-21 LAB
BACTERIA BLD CULT: NORMAL
BACTERIA BLD CULT: NORMAL

## 2023-08-09 PROBLEM — G31.83 NEUROCOGNITIVE DISORDER WITH LEWY BODIES: Status: ACTIVE | Noted: 2022-04-27

## 2023-08-09 PROBLEM — M71.21 BAKER CYST, RIGHT: Status: ACTIVE | Noted: 2023-08-09

## 2023-08-09 PROBLEM — F02.80 NEUROCOGNITIVE DISORDER WITH LEWY BODIES: Status: ACTIVE | Noted: 2022-04-27

## 2023-10-08 ENCOUNTER — HOSPITAL ENCOUNTER (EMERGENCY)
Facility: HOSPITAL | Age: 78
Discharge: HOME | End: 2023-10-08
Attending: EMERGENCY MEDICINE
Payer: MEDICARE

## 2023-10-08 VITALS
HEART RATE: 85 BPM | TEMPERATURE: 98 F | OXYGEN SATURATION: 97 % | BODY MASS INDEX: 32 KG/M2 | RESPIRATION RATE: 20 BRPM | WEIGHT: 163 LBS | DIASTOLIC BLOOD PRESSURE: 90 MMHG | HEIGHT: 60 IN | SYSTOLIC BLOOD PRESSURE: 148 MMHG

## 2023-10-08 DIAGNOSIS — F03.918 DEMENTIA WITH BEHAVIORAL PROBLEM: Primary | ICD-10-CM

## 2023-10-08 LAB
ALBUMIN SERPL-MCNC: 3.6 G/DL (ref 3.4–4.8)
ALBUMIN/GLOB SERPL: 0.9 RATIO (ref 1.1–2)
ALP SERPL-CCNC: 104 UNIT/L (ref 40–150)
ALT SERPL-CCNC: 15 UNIT/L (ref 0–55)
AMPHET UR QL SCN: NEGATIVE
APAP SERPL-MCNC: <17.4 UG/ML (ref 17.4–30)
APPEARANCE UR: CLEAR
AST SERPL-CCNC: 20 UNIT/L (ref 5–34)
BACTERIA #/AREA URNS AUTO: NORMAL /HPF
BARBITURATE SCN PRESENT UR: NEGATIVE
BASOPHILS # BLD AUTO: 0.04 X10(3)/MCL
BASOPHILS NFR BLD AUTO: 0.9 %
BENZODIAZ UR QL SCN: NEGATIVE
BILIRUB SERPL-MCNC: 0.2 MG/DL
BILIRUB UR QL STRIP.AUTO: NEGATIVE
BUN SERPL-MCNC: 15.4 MG/DL (ref 9.8–20.1)
CALCIUM SERPL-MCNC: 9.6 MG/DL (ref 8.4–10.2)
CANNABINOIDS UR QL SCN: NEGATIVE
CHLORIDE SERPL-SCNC: 104 MMOL/L (ref 98–107)
CO2 SERPL-SCNC: 27 MMOL/L (ref 23–31)
COCAINE UR QL SCN: NEGATIVE
COLOR UR AUTO: YELLOW
CREAT SERPL-MCNC: 0.71 MG/DL (ref 0.55–1.02)
EOSINOPHIL # BLD AUTO: 0.08 X10(3)/MCL (ref 0–0.9)
EOSINOPHIL NFR BLD AUTO: 1.9 %
ERYTHROCYTE [DISTWIDTH] IN BLOOD BY AUTOMATED COUNT: 13.7 % (ref 11.5–17)
ETHANOL SERPL-MCNC: <10 MG/DL
FENTANYL UR QL SCN: NEGATIVE
GFR SERPLBLD CREATININE-BSD FMLA CKD-EPI: >60 MLS/MIN/1.73/M2
GLOBULIN SER-MCNC: 4 GM/DL (ref 2.4–3.5)
GLUCOSE SERPL-MCNC: 84 MG/DL (ref 82–115)
GLUCOSE UR QL STRIP.AUTO: NEGATIVE
HCT VFR BLD AUTO: 40.4 % (ref 37–47)
HGB BLD-MCNC: 13.6 G/DL (ref 12–16)
IMM GRANULOCYTES # BLD AUTO: 0 X10(3)/MCL (ref 0–0.04)
IMM GRANULOCYTES NFR BLD AUTO: 0 %
KETONES UR QL STRIP.AUTO: NEGATIVE
LEUKOCYTE ESTERASE UR QL STRIP.AUTO: NEGATIVE
LYMPHOCYTES # BLD AUTO: 1.64 X10(3)/MCL (ref 0.6–4.6)
LYMPHOCYTES NFR BLD AUTO: 38 %
MCH RBC QN AUTO: 30.5 PG (ref 27–31)
MCHC RBC AUTO-ENTMCNC: 33.7 G/DL (ref 33–36)
MCV RBC AUTO: 90.6 FL (ref 80–94)
MDMA UR QL SCN: NEGATIVE
MONOCYTES # BLD AUTO: 0.39 X10(3)/MCL (ref 0.1–1.3)
MONOCYTES NFR BLD AUTO: 9 %
NEUTROPHILS # BLD AUTO: 2.17 X10(3)/MCL (ref 2.1–9.2)
NEUTROPHILS NFR BLD AUTO: 50.2 %
NITRITE UR QL STRIP.AUTO: NEGATIVE
NRBC BLD AUTO-RTO: 0 %
OPIATES UR QL SCN: NEGATIVE
PCP UR QL: NEGATIVE
PH UR STRIP.AUTO: 6 [PH]
PH UR: 6 [PH] (ref 3–11)
PLATELET # BLD AUTO: 275 X10(3)/MCL (ref 130–400)
PMV BLD AUTO: 8.8 FL (ref 7.4–10.4)
POTASSIUM SERPL-SCNC: 3.9 MMOL/L (ref 3.5–5.1)
PROT SERPL-MCNC: 7.6 GM/DL (ref 5.8–7.6)
PROT UR QL STRIP.AUTO: NEGATIVE
RBC # BLD AUTO: 4.46 X10(6)/MCL (ref 4.2–5.4)
RBC #/AREA URNS AUTO: <5 /HPF
RBC UR QL AUTO: NEGATIVE
SARS-COV-2 RDRP RESP QL NAA+PROBE: NEGATIVE
SODIUM SERPL-SCNC: 141 MMOL/L (ref 136–145)
SP GR UR STRIP.AUTO: 1.01 (ref 1–1.03)
SQUAMOUS #/AREA URNS AUTO: <5 /HPF
UROBILINOGEN UR STRIP-ACNC: 0.2
WBC # SPEC AUTO: 4.32 X10(3)/MCL (ref 4.5–11.5)
WBC #/AREA URNS AUTO: <5 /HPF

## 2023-10-08 PROCEDURE — 81001 URINALYSIS AUTO W/SCOPE: CPT | Performed by: EMERGENCY MEDICINE

## 2023-10-08 PROCEDURE — 85025 COMPLETE CBC W/AUTO DIFF WBC: CPT | Performed by: EMERGENCY MEDICINE

## 2023-10-08 PROCEDURE — 82077 ASSAY SPEC XCP UR&BREATH IA: CPT | Performed by: EMERGENCY MEDICINE

## 2023-10-08 PROCEDURE — 25000003 PHARM REV CODE 250: Performed by: EMERGENCY MEDICINE

## 2023-10-08 PROCEDURE — 80053 COMPREHEN METABOLIC PANEL: CPT | Performed by: EMERGENCY MEDICINE

## 2023-10-08 PROCEDURE — 80143 DRUG ASSAY ACETAMINOPHEN: CPT | Performed by: EMERGENCY MEDICINE

## 2023-10-08 PROCEDURE — 80307 DRUG TEST PRSMV CHEM ANLYZR: CPT | Performed by: EMERGENCY MEDICINE

## 2023-10-08 PROCEDURE — 87635 SARS-COV-2 COVID-19 AMP PRB: CPT | Performed by: EMERGENCY MEDICINE

## 2023-10-08 PROCEDURE — 99283 EMERGENCY DEPT VISIT LOW MDM: CPT

## 2023-10-08 RX ORDER — AMLODIPINE BESYLATE 5 MG/1
10 TABLET ORAL
Status: COMPLETED | OUTPATIENT
Start: 2023-10-08 | End: 2023-10-08

## 2023-10-08 RX ORDER — ATORVASTATIN CALCIUM 40 MG/1
40 TABLET, FILM COATED ORAL ONCE
Status: COMPLETED | OUTPATIENT
Start: 2023-10-08 | End: 2023-10-08

## 2023-10-08 RX ORDER — LORAZEPAM 0.5 MG/1
0.5 TABLET ORAL
Status: COMPLETED | OUTPATIENT
Start: 2023-10-08 | End: 2023-10-08

## 2023-10-08 RX ORDER — RIVASTIGMINE TARTRATE 1.5 MG/1
3 CAPSULE ORAL ONCE
Status: COMPLETED | OUTPATIENT
Start: 2023-10-08 | End: 2023-10-08

## 2023-10-08 RX ORDER — HYDROCHLOROTHIAZIDE 25 MG/1
25 TABLET ORAL
Status: COMPLETED | OUTPATIENT
Start: 2023-10-08 | End: 2023-10-08

## 2023-10-08 RX ORDER — LOSARTAN POTASSIUM 50 MG/1
100 TABLET ORAL ONCE
Status: COMPLETED | OUTPATIENT
Start: 2023-10-08 | End: 2023-10-08

## 2023-10-08 RX ORDER — OXYBUTYNIN CHLORIDE 5 MG/1
5 TABLET ORAL ONCE
Status: DISCONTINUED | OUTPATIENT
Start: 2023-10-08 | End: 2023-10-08 | Stop reason: HOSPADM

## 2023-10-08 RX ORDER — OXCARBAZEPINE 150 MG/1
150 TABLET, FILM COATED ORAL ONCE
Status: COMPLETED | OUTPATIENT
Start: 2023-10-08 | End: 2023-10-08

## 2023-10-08 RX ORDER — OXYBUTYNIN CHLORIDE 5 MG/1
5 TABLET ORAL 3 TIMES DAILY
Status: DISCONTINUED | OUTPATIENT
Start: 2023-10-08 | End: 2023-10-08

## 2023-10-08 RX ADMIN — LORAZEPAM 0.5 MG: 0.5 TABLET ORAL at 11:10

## 2023-10-08 RX ADMIN — HYDROCHLOROTHIAZIDE 25 MG: 25 TABLET ORAL at 11:10

## 2023-10-08 RX ADMIN — RIVASTIGMINE TARTRATE 3 MG: 1.5 CAPSULE ORAL at 11:10

## 2023-10-08 RX ADMIN — AMLODIPINE BESYLATE 10 MG: 5 TABLET ORAL at 11:10

## 2023-10-08 RX ADMIN — ATORVASTATIN CALCIUM 40 MG: 40 TABLET, FILM COATED ORAL at 11:10

## 2023-10-08 RX ADMIN — OXCARBAZEPINE 150 MG: 150 TABLET, FILM COATED ORAL at 11:10

## 2023-10-08 RX ADMIN — LOSARTAN POTASSIUM 100 MG: 50 TABLET, FILM COATED ORAL at 11:10

## 2023-10-08 NOTE — ED PROVIDER NOTES
Encounter Date: 10/8/2023    SCRIBE #1 NOTE: I, Eladia Shelton, am scribing for, and in the presence of,  Lary Feliciano MD. I have scribed the following portions of the note - Other sections scribed: HPI, ROS, PE.       History     Chief Complaint   Patient presents with    Aggressive Behavior     [facility] reports combative, increase in aggressive behavior and refusing to take medications. Pt calm at this time. [facility] reports patient cooperative with them however she is paranoid.  Hx dementia     78 year old female with a pmhx of HTN and dementia presents to the ED from [facility] for aggressive behavior onset this morning.  The patient's RN states that she stopped her medications today, and refused to take them.  The patient became combative and was sent here thereafter.  The patient says she is here because she was having pain in her knee last night, and does not recall any arguments from this morning.  The patient denies any hallucinations, fever, dysuria, SI, or HI.  The HPI is limited due to the patient's condition.    The history is provided by the patient and a caregiver. The history is limited by the condition of the patient. No  was used.     Review of patient's allergies indicates:  No Known Allergies  Past Medical History:   Diagnosis Date    Cellulitis     Hypertension      Past Surgical History:   Procedure Laterality Date    HYSTERECTOMY       Family History   Problem Relation Age of Onset    Obesity Mother     Heart disease Father     Obesity Father     Dementia Maternal Aunt     Aneurysm Neg Hx     Cancer Neg Hx     Clotting disorder Neg Hx     Diabetes Neg Hx     Fainting Neg Hx     Hyperlipidemia Neg Hx     Kidney disease Neg Hx     Liver disease Neg Hx     Migraines Neg Hx     Neuropathy Neg Hx     Parkinsonism Neg Hx     Seizures Neg Hx     Stroke Neg Hx     Tremor Neg Hx      Social History     Tobacco Use    Smoking status: Former     Passive  exposure: Never    Smokeless tobacco: Never   Substance Use Topics    Alcohol use: Not Currently     Comment: occ    Drug use: Not Currently     Review of Systems   Unable to perform ROS: Dementia   Constitutional:  Negative for fever.   Genitourinary:  Negative for dysuria.   Psychiatric/Behavioral:  Negative for hallucinations and suicidal ideas.        Physical Exam     Initial Vitals [10/08/23 0947]   BP Pulse Resp Temp SpO2   138/88 78 20 97.9 °F (36.6 °C) 96 %      MAP       --         Physical Exam    Nursing note and vitals reviewed.  Constitutional: She appears well-developed and well-nourished. She is not diaphoretic. No distress.   HENT:   Head: Normocephalic and atraumatic.   Nose: Nose normal.   Mouth/Throat: Oropharynx is clear and moist.   Eyes: Conjunctivae and EOM are normal. Pupils are equal, round, and reactive to light.   Neck: Trachea normal. Neck supple.   Normal range of motion.  Cardiovascular:  Normal rate, regular rhythm, normal heart sounds and intact distal pulses.           No murmur heard.  Pulmonary/Chest: Breath sounds normal. No respiratory distress. She has no wheezes. She has no rhonchi. She has no rales. She exhibits no tenderness.   Abdominal: Abdomen is soft. Bowel sounds are normal. She exhibits no distension and no mass. There is no abdominal tenderness. There is no rebound and no guarding.   Musculoskeletal:         General: No tenderness or edema. Normal range of motion.      Cervical back: Normal range of motion and neck supple.      Lumbar back: Normal. Normal range of motion.     Neurological: She is alert and oriented to person, place, and time. She has normal strength. No cranial nerve deficit or sensory deficit.   Skin: Skin is warm and dry. Capillary refill takes less than 2 seconds. No abscess noted. No erythema. No pallor.   Psychiatric: She is combative. Thought content is paranoid (somewhat). She expresses no homicidal and no suicidal ideation. She expresses no  suicidal plans and no homicidal plans.   Not fully cooperative  Confused         ED Course   Procedures  Labs Reviewed   COMPREHENSIVE METABOLIC PANEL - Abnormal; Notable for the following components:       Result Value    Globulin 4.0 (*)     Albumin/Globulin Ratio 0.9 (*)     All other components within normal limits   ACETAMINOPHEN LEVEL - Abnormal; Notable for the following components:    Acetaminophen Level <17.4 (*)     All other components within normal limits   CBC WITH DIFFERENTIAL - Abnormal; Notable for the following components:    WBC 4.32 (*)     All other components within normal limits   URINALYSIS, REFLEX TO URINE CULTURE - Normal   ALCOHOL,MEDICAL (ETHANOL) - Normal   SARS-COV-2 RNA AMPLIFICATION, QUAL - Normal    Narrative:     The IDNOW COVID-19 assay is a rapid molecular in vitro diagnostic test utilizing an isothermal nucleic acid amplification technology intended for the qualitative detection of nucleic acid from the SARS-CoV-2 viral RNA in direct nasal, nasopharyngeal or throat swabs from individuals who are suspected of COVID-19 by their healthcare provider.   URINALYSIS, MICROSCOPIC - Normal   CBC W/ AUTO DIFFERENTIAL    Narrative:     The following orders were created for panel order CBC auto differential.  Procedure                               Abnormality         Status                     ---------                               -----------         ------                     CBC with Differential[[id number]]        Abnormal            Final result                 Please view results for these tests on the individual orders.   DRUG SCREEN, URINE (BEAKER)          Imaging Results    None          Medications   LORazepam tablet 0.5 mg (has no administration in time range)   oxybutynin tablet 5 mg (has no administration in time range)   amLODIPine tablet 10 mg (has no administration in time range)   atorvastatin tablet 40 mg (has no administration in time range)   hydroCHLOROthiazide tablet  25 mg (has no administration in time range)   losartan tablet 100 mg (has no administration in time range)   rivastigmine tartrate capsule 3 mg (has no administration in time range)   OXcarbazepine tablet 150 mg (has no administration in time range)     Medical Decision Making  The differential diagnoses includes but is not limited to: dementia, electrolyte derangement, UTI.  Cbc, cmp, acetaminophen, rapid covid, ua, uds, ethanol ordered and reviewed  She has been calm and cooperative here. Took her medications, she does not meet criteria for pec or inpatient admission, would not be of benefit as some behavior disturbances can occur and have occurred with her due to her dementia  Labs wnl    Problems Addressed:  Dementia with behavioral problem: acute illness or injury that poses a threat to life or bodily functions    Amount and/or Complexity of Data Reviewed  External Data Reviewed: notes.     Details: Previous visits for similar, was more combative and PEC'd there  Labs: ordered. Decision-making details documented in ED Course.    Risk  OTC drugs.  Prescription drug management.            Scribe Attestation:   Scribe #1: I performed the above scribed service and the documentation accurately describes the services I performed. I attest to the accuracy of the note.  Comments: Attending:   Physician Attestation Statement for Scribe #1: ILary MD, personally performed the services described in this documentation. All medical record entries made by the scribe were at my direction and in my presence.  I have reviewed the chart and agree that the record reflects my personal performance and is accurate and complete.        Attending Attestation:           Physician Attestation for Scribe:  Physician Attestation Statement for Scribe #1: ILary MD, reviewed documentation, as scribed by Eladia Shelton in my presence, and it is both accurate and complete.             ED Course as of  10/08/23 1135   [place] Oct 08, 2023   1132 She took her medication without issue, has been calm and cooperative. Can return to nh [BS]      ED Course User Index  [BS] Lary Felicinao MD               Medical Decision Making:   History:   Old Medical Records: I decided to obtain old medical records.  Old Records Summarized: records from previous admission(s).  Initial Assessment:   See hpi  Clinical Tests:   Lab Tests: Ordered and Reviewed      Clinical Impression:   Final diagnoses:  [F03.918] Dementia with behavioral problem (Primary)        ED Disposition Condition    Discharge Stable          ED Prescriptions    None       Follow-up Information       Follow up With Specialties Details Why Contact Info    IVÁN Chen MD Family Medicine Schedule an appointment as soon as possible for a visit   [address] [place] [address] [phone]      [facility] - Emergency Dept Emergency Medicine  As needed, If symptoms worsen [address] [place] [id number] [phone]             Lary Feliciano MD  10/08/23 1135

## 2023-10-08 NOTE — DISCHARGE INSTRUCTIONS
She took her morning meds in the ER without issue, does not meet criteria for PEC or inpatient psychiatric admission at this time

## 2023-10-09 ENCOUNTER — HOSPITAL ENCOUNTER (EMERGENCY)
Facility: HOSPITAL | Age: 78
Discharge: HOME | End: 2023-10-09
Attending: EMERGENCY MEDICINE
Payer: MEDICARE

## 2023-10-09 VITALS
HEART RATE: 70 BPM | TEMPERATURE: 98 F | RESPIRATION RATE: 18 BRPM | BODY MASS INDEX: 32.39 KG/M2 | DIASTOLIC BLOOD PRESSURE: 67 MMHG | OXYGEN SATURATION: 98 % | WEIGHT: 165 LBS | HEIGHT: 60 IN | SYSTOLIC BLOOD PRESSURE: 122 MMHG

## 2023-10-09 DIAGNOSIS — F03.90 DEMENTIA, UNSPECIFIED DEMENTIA SEVERITY, UNSPECIFIED DEMENTIA TYPE, UNSPECIFIED WHETHER BEHAVIORAL, PSYCHOTIC, OR MOOD DISTURBANCE OR ANXIETY: Primary | ICD-10-CM

## 2023-10-09 PROCEDURE — 99283 EMERGENCY DEPT VISIT LOW MDM: CPT

## 2023-10-09 PROCEDURE — 25000003 PHARM REV CODE 250: Performed by: NURSE PRACTITIONER

## 2023-10-09 RX ORDER — CLONAZEPAM 0.5 MG/1
0.5 TABLET ORAL
Status: COMPLETED | OUTPATIENT
Start: 2023-10-09 | End: 2023-10-09

## 2023-10-09 RX ADMIN — CLONAZEPAM 0.5 MG: 0.5 TABLET ORAL at 05:10

## 2023-10-09 NOTE — ED TRIAGE NOTES
Here via [facility] from [facility] for aggressive behavior directed toward nursing staff. Seen @ [facility] 10/8 for same complaint. Asleep on EMS arrival

## 2023-10-09 NOTE — ED PROVIDER NOTES
Encounter Date: 10/9/2023       History     Chief Complaint   Patient presents with    medical evaluation     EMS reports that patient was sent to the ED from [facility] due to her being combative and not taking her medications today. Hx. Of Alzheimer's, Dementia. Patient denies any complaints. Patient denies any hallucinations, suicidal ideations, or homicidal ideations.     The history is provided by the EMS personnel. History limited by: Dementia.     Review of patient's allergies indicates:  No Known Allergies  Past Medical History:   Diagnosis Date    Cellulitis     Hypertension      Past Surgical History:   Procedure Laterality Date    HYSTERECTOMY       Family History   Problem Relation Age of Onset    Obesity Mother     Heart disease Father     Obesity Father     Dementia Maternal Aunt     Aneurysm Neg Hx     Cancer Neg Hx     Clotting disorder Neg Hx     Diabetes Neg Hx     Fainting Neg Hx     Hyperlipidemia Neg Hx     Kidney disease Neg Hx     Liver disease Neg Hx     Migraines Neg Hx     Neuropathy Neg Hx     Parkinsonism Neg Hx     Seizures Neg Hx     Stroke Neg Hx     Tremor Neg Hx      Social History     Tobacco Use    Smoking status: Former     Passive exposure: Never    Smokeless tobacco: Never   Substance Use Topics    Alcohol use: Not Currently     Comment: occ    Drug use: Not Currently     Review of Systems   Unable to perform ROS: Dementia       Physical Exam     Initial Vitals [10/09/23 1525]   BP Pulse Resp Temp SpO2   122/67 70 18 98.4 °F (36.9 °C) 98 %      MAP       --         Physical Exam    Nursing note and vitals reviewed.  Constitutional: She appears well-developed and well-nourished. No distress.   HENT:   Head: Normocephalic and atraumatic.   Mouth/Throat: Oropharynx is clear and moist.   Eyes: Conjunctivae and EOM are normal. Pupils are equal, round, and reactive to light.   Neck: Neck supple.   Normal range of motion.  Cardiovascular:  Normal rate, regular rhythm, normal heart  sounds and intact distal pulses.           Pulmonary/Chest: Breath sounds normal. No respiratory distress. She has no wheezes.   Abdominal: Abdomen is soft. Bowel sounds are normal. She exhibits no distension. There is no abdominal tenderness.   Musculoskeletal:         General: No tenderness or edema. Normal range of motion.      Cervical back: Normal range of motion and neck supple.     Neurological: She is alert. She has normal strength.   Patient is at her baseline orientation wise.    Skin: Skin is warm and dry. No rash noted.   Psychiatric: She has a normal mood and affect. Her speech is normal. She is not agitated, not aggressive, not actively hallucinating and not combative. Thought content is not paranoid. She expresses no homicidal and no suicidal ideation.   Confused.         ED Course   Procedures  Labs Reviewed - No data to display       Imaging Results    None          Medications   clonazePAM tablet 0.5 mg (0.5 mg Oral Given 10/9/23 1716)     Medical Decision Making  EMS reports that patient was sent to the ED from [facility] due to her being combative and not taking her medications today. Hx. Of Alzheimer's, Dementia. Patient denies any complaints. Patient denies any hallucinations, suicidal ideations, or homicidal ideations.     The history is provided by the EMS personnel. History limited by: Dementia.         Amount and/or Complexity of Data Reviewed  External Data Reviewed: labs and notes.     Details: Reviewed labs that were done yesterday during her ED visit and labs and UA were negative.   Discussion of management or test interpretation with external provider(s): Differential diagnosis (including but not limited to):   Judging by the patient's chief complaint and pertinent history, the patient has the following possible differential diagnoses, including but not limited to the following.  Some of these are deemed to be lower likelihood and some more likely based on my physical exam and  history combined with possible lab work and/or imaging studies.   Please see the pertinent studies, and refer to the HPI.  Some of these diagnoses will take further evaluation to fully rule out, perhaps as an outpatient and the patient was encouraged to follow up when discharged for more comprehensive evaluation.  Dementia, Alzheimer's    Patient is awake, alert, and at her cognitive baseline as reported by EMS. Patient is sitting up on exam stretcher and is calm and easily redirected in the ED. Patient is not homicidal or suicidal and is having hallucinations. Patient took medications that were ordered for her in the ED. Patient had a full work-up in the ED yesterday that did not show any acute finding. Discussed patient with Dr. Polo and patient does not meet criteria for pec or inpatient admission and it would not be of benefit as some behavior disturbances can occur and have occurred with her due to her dementia. We will plan to discharge patient back to her nursing home.                                Clinical Impression:   Final diagnoses:  [F03.90] Dementia, unspecified dementia severity, unspecified dementia type, unspecified whether behavioral, psychotic, or mood disturbance or anxiety (Primary)        ED Disposition Condition    Discharge Stable          ED Prescriptions    None       Follow-up Information    None          Heather Lawson, WILLIAM  10/09/23 1958

## 2023-12-04 ENCOUNTER — LAB REQUISITION (OUTPATIENT)
Dept: LAB | Facility: HOSPITAL | Age: 78
End: 2023-12-04
Payer: MEDICARE

## 2023-12-04 DIAGNOSIS — I10 ESSENTIAL (PRIMARY) HYPERTENSION: ICD-10-CM

## 2023-12-04 LAB
ALBUMIN SERPL-MCNC: 3.1 G/DL (ref 3.4–4.8)
ALBUMIN/GLOB SERPL: 1.1 RATIO (ref 1.1–2)
ALP SERPL-CCNC: 91 UNIT/L (ref 40–150)
ALT SERPL-CCNC: 22 UNIT/L (ref 0–55)
AST SERPL-CCNC: 20 UNIT/L (ref 5–34)
BILIRUB SERPL-MCNC: 0.2 MG/DL
BUN SERPL-MCNC: 15.2 MG/DL (ref 9.8–20.1)
CALCIUM SERPL-MCNC: 8.8 MG/DL (ref 8.4–10.2)
CHLORIDE SERPL-SCNC: 106 MMOL/L (ref 98–107)
CHOLEST SERPL-MCNC: 115 MG/DL
CHOLEST/HDLC SERPL: 3 {RATIO} (ref 0–5)
CO2 SERPL-SCNC: 30 MMOL/L (ref 23–31)
CREAT SERPL-MCNC: 0.64 MG/DL (ref 0.55–1.02)
GFR SERPLBLD CREATININE-BSD FMLA CKD-EPI: >60 MLS/MIN/1.73/M2
GLOBULIN SER-MCNC: 2.8 GM/DL (ref 2.4–3.5)
GLUCOSE SERPL-MCNC: 75 MG/DL (ref 82–115)
HDLC SERPL-MCNC: 33 MG/DL (ref 35–60)
LDLC SERPL CALC-MCNC: 73 MG/DL (ref 50–140)
POTASSIUM SERPL-SCNC: 4.1 MMOL/L (ref 3.5–5.1)
PROT SERPL-MCNC: 5.9 GM/DL (ref 5.8–7.6)
SODIUM SERPL-SCNC: 140 MMOL/L (ref 136–145)
TRIGL SERPL-MCNC: 47 MG/DL (ref 37–140)
VLDLC SERPL CALC-MCNC: 9 MG/DL

## 2023-12-04 PROCEDURE — 80061 LIPID PANEL: CPT | Performed by: THORACIC SURGERY (CARDIOTHORACIC VASCULAR SURGERY)

## 2023-12-04 PROCEDURE — 80053 COMPREHEN METABOLIC PANEL: CPT | Performed by: THORACIC SURGERY (CARDIOTHORACIC VASCULAR SURGERY)

## 2024-01-01 PROCEDURE — 81003 URINALYSIS AUTO W/O SCOPE: CPT | Performed by: FAMILY MEDICINE

## 2024-01-02 ENCOUNTER — LAB REQUISITION (OUTPATIENT)
Dept: LAB | Facility: HOSPITAL | Age: 79
End: 2024-01-02
Payer: MEDICARE

## 2024-01-02 DIAGNOSIS — N39.0 URINARY TRACT INFECTION, SITE NOT SPECIFIED: ICD-10-CM

## 2024-01-02 LAB
APPEARANCE UR: CLEAR
BILIRUB UR QL STRIP.AUTO: NEGATIVE
COLOR UR AUTO: YELLOW
GLUCOSE UR QL STRIP.AUTO: NEGATIVE
KETONES UR QL STRIP.AUTO: NEGATIVE
LEUKOCYTE ESTERASE UR QL STRIP.AUTO: NEGATIVE
NITRITE UR QL STRIP.AUTO: NEGATIVE
PH UR STRIP.AUTO: 7 [PH]
PROT UR QL STRIP.AUTO: NEGATIVE
RBC UR QL AUTO: NEGATIVE
SP GR UR STRIP.AUTO: 1.02 (ref 1–1.03)
UROBILINOGEN UR STRIP-ACNC: 0.2

## 2024-01-07 ENCOUNTER — HOSPITAL ENCOUNTER (EMERGENCY)
Facility: HOSPITAL | Age: 79
Discharge: HOME | End: 2024-01-08
Attending: EMERGENCY MEDICINE
Payer: MEDICARE

## 2024-01-07 DIAGNOSIS — R45.1 AGITATION: Primary | ICD-10-CM

## 2024-01-07 LAB
ALBUMIN SERPL-MCNC: 3.4 G/DL (ref 3.4–4.8)
ALBUMIN/GLOB SERPL: 1 RATIO (ref 1.1–2)
ALP SERPL-CCNC: 101 UNIT/L (ref 40–150)
ALT SERPL-CCNC: 17 UNIT/L (ref 0–55)
AST SERPL-CCNC: 20 UNIT/L (ref 5–34)
BASOPHILS # BLD AUTO: 0.04 X10(3)/MCL
BASOPHILS NFR BLD AUTO: 0.8 %
BILIRUB SERPL-MCNC: 0.2 MG/DL
BUN SERPL-MCNC: 18.1 MG/DL (ref 9.8–20.1)
CALCIUM SERPL-MCNC: 8.9 MG/DL (ref 8.4–10.2)
CHLORIDE SERPL-SCNC: 104 MMOL/L (ref 98–107)
CO2 SERPL-SCNC: 26 MMOL/L (ref 23–31)
CREAT SERPL-MCNC: 0.66 MG/DL (ref 0.55–1.02)
EOSINOPHIL # BLD AUTO: 0.16 X10(3)/MCL (ref 0–0.9)
EOSINOPHIL NFR BLD AUTO: 3.1 %
ERYTHROCYTE [DISTWIDTH] IN BLOOD BY AUTOMATED COUNT: 13.3 % (ref 11.5–17)
GFR SERPLBLD CREATININE-BSD FMLA CKD-EPI: >60 MLS/MIN/1.73/M2
GLOBULIN SER-MCNC: 3.3 GM/DL (ref 2.4–3.5)
GLUCOSE SERPL-MCNC: 85 MG/DL (ref 82–115)
HCT VFR BLD AUTO: 35.7 % (ref 37–47)
HGB BLD-MCNC: 11.7 G/DL (ref 12–16)
IMM GRANULOCYTES # BLD AUTO: 0.01 X10(3)/MCL (ref 0–0.04)
IMM GRANULOCYTES NFR BLD AUTO: 0.2 %
LYMPHOCYTES # BLD AUTO: 2.44 X10(3)/MCL (ref 0.6–4.6)
LYMPHOCYTES NFR BLD AUTO: 47.8 %
MCH RBC QN AUTO: 31.2 PG (ref 27–31)
MCHC RBC AUTO-ENTMCNC: 32.8 G/DL (ref 33–36)
MCV RBC AUTO: 95.2 FL (ref 80–94)
MONOCYTES # BLD AUTO: 0.55 X10(3)/MCL (ref 0.1–1.3)
MONOCYTES NFR BLD AUTO: 10.8 %
NEUTROPHILS # BLD AUTO: 1.9 X10(3)/MCL (ref 2.1–9.2)
NEUTROPHILS NFR BLD AUTO: 37.3 %
NRBC BLD AUTO-RTO: 0 %
PLATELET # BLD AUTO: 241 X10(3)/MCL (ref 130–400)
PMV BLD AUTO: 8.9 FL (ref 7.4–10.4)
POTASSIUM SERPL-SCNC: 4 MMOL/L (ref 3.5–5.1)
PROT SERPL-MCNC: 6.7 GM/DL (ref 5.8–7.6)
RBC # BLD AUTO: 3.75 X10(6)/MCL (ref 4.2–5.4)
SODIUM SERPL-SCNC: 136 MMOL/L (ref 136–145)
TROPONIN I SERPL-MCNC: <0.01 NG/ML (ref 0–0.04)
WBC # SPEC AUTO: 5.1 X10(3)/MCL (ref 4.5–11.5)

## 2024-01-07 PROCEDURE — 84484 ASSAY OF TROPONIN QUANT: CPT | Performed by: EMERGENCY MEDICINE

## 2024-01-07 PROCEDURE — 85025 COMPLETE CBC W/AUTO DIFF WBC: CPT | Performed by: EMERGENCY MEDICINE

## 2024-01-07 PROCEDURE — 99284 EMERGENCY DEPT VISIT MOD MDM: CPT | Mod: 25

## 2024-01-07 PROCEDURE — 80053 COMPREHEN METABOLIC PANEL: CPT | Performed by: EMERGENCY MEDICINE

## 2024-01-07 RX ORDER — HALOPERIDOL 5 MG/ML
2 INJECTION INTRAMUSCULAR
Status: DISCONTINUED | OUTPATIENT
Start: 2024-01-07 | End: 2024-01-08 | Stop reason: HOSPADM

## 2024-01-08 VITALS
WEIGHT: 170 LBS | RESPIRATION RATE: 16 BRPM | HEART RATE: 71 BPM | SYSTOLIC BLOOD PRESSURE: 138 MMHG | TEMPERATURE: 98 F | DIASTOLIC BLOOD PRESSURE: 65 MMHG | BODY MASS INDEX: 33.38 KG/M2 | HEIGHT: 60 IN | OXYGEN SATURATION: 99 %

## 2024-01-08 LAB
APPEARANCE UR: ABNORMAL
BACTERIA #/AREA URNS AUTO: ABNORMAL /HPF
BILIRUB UR QL STRIP.AUTO: NEGATIVE
COLOR UR AUTO: ABNORMAL
FLUAV AG UPPER RESP QL IA.RAPID: NOT DETECTED
FLUBV AG UPPER RESP QL IA.RAPID: NOT DETECTED
GLUCOSE UR QL STRIP.AUTO: NORMAL
KETONES UR QL STRIP.AUTO: NEGATIVE
LEUKOCYTE ESTERASE UR QL STRIP.AUTO: NEGATIVE
MUCOUS THREADS URNS QL MICRO: ABNORMAL /LPF
NITRITE UR QL STRIP.AUTO: NEGATIVE
PH UR STRIP.AUTO: 6 [PH]
PROT UR QL STRIP.AUTO: NEGATIVE
RBC #/AREA URNS AUTO: ABNORMAL /HPF
RBC UR QL AUTO: NEGATIVE
SARS-COV-2 RNA RESP QL NAA+PROBE: NOT DETECTED
SP GR UR STRIP.AUTO: 1.02 (ref 1–1.03)
SQUAMOUS #/AREA URNS LPF: ABNORMAL /HPF
UROBILINOGEN UR STRIP-ACNC: NORMAL
WBC #/AREA URNS AUTO: ABNORMAL /HPF

## 2024-01-08 PROCEDURE — 81001 URINALYSIS AUTO W/SCOPE: CPT | Performed by: EMERGENCY MEDICINE

## 2024-01-08 PROCEDURE — 0240U COVID/FLU A&B PCR: CPT | Performed by: EMERGENCY MEDICINE
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
## 2024-01-08 NOTE — DISCHARGE INSTRUCTIONS
Recommend haldol or antipsychotic prn for agitation, taper/dc benzodiazepines, outpt psych consultation.    Thanks for letting us take care of you today!  It is our goal to give you courteous care and to keep you comfortable and informed, if you have any questions before you leave I will be happy to try and answer them.    Here is some advice after your visit:      Your visit in the emergency department is NOT definitive care - please follow-up with your primary care doctor and/or specialist within 1-2 days.  Please return if you have any worsening in your condition or if you have any other concerns.    If you had radiology exams like an XRAY or CT in the emergency Department the interpreation on them may be preliminary - there may be less time sensitive findings on the reports please obtain these reports within 24 hours from the hospital or by using your out on your mobile phone to access records.  Bring these to your primary care doctor and/or specialist for further review of incidental findings.    Please review any LAB WORK from your visit today with your primary care physician.    If you were prescribed OPIATE PAIN MEDICATION - please understand of these medications can be addictive, you may fill less of the prescription was written for, you do not have to take the full prescription.  You may discard what you do not use.  Please seek help if you feel you are having problems with addiction.  Do not drive or operate heavy machinery if you are taking sedating medications.  Do not mix these medications with alcohol.      If you had a SPLINT placed in the emergency department if you have severe pain numbness tingling or discoloration of year digits please remove the splint and return to the emergency department for further evaluation as this may represent a sign of compromise to the nerves or blood vessels due to swelling.    If you had SUTURES in the emergency department please have them removed in the prescribed  time frame typically within 7-14 days.  You may shower but please do not bathe or swim.  Keep the wounds clean and dry and covered with a clean dressing.  Please return if he have any signs of infection like redness or drainage or pain at the suture site.    Please take the full course of  any ANTIBIOTICS you were prescribed - incomplete courses of antibiotics can cause resistance to antibiotics in the future which will make it difficult to treat any infections you may have.

## 2024-02-05 DIAGNOSIS — F02.80 DEMENTIA DUE TO MEDICAL CONDITION WITHOUT BEHAVIORAL DISTURBANCE: Primary | ICD-10-CM

## 2024-02-09 ENCOUNTER — HOSPITAL ENCOUNTER (OUTPATIENT)
Dept: RADIOLOGY | Facility: HOSPITAL | Age: 79
Discharge: HOME | End: 2024-02-09
Attending: THORACIC SURGERY (CARDIOTHORACIC VASCULAR SURGERY)
Payer: MEDICARE

## 2024-02-09 DIAGNOSIS — F02.80 DEMENTIA DUE TO MEDICAL CONDITION WITHOUT BEHAVIORAL DISTURBANCE: ICD-10-CM

## 2024-02-09 PROCEDURE — 70450 CT HEAD/BRAIN W/O DYE: CPT | Mod: TC

## 2024-02-27 ENCOUNTER — LAB REQUISITION (OUTPATIENT)
Dept: LAB | Facility: HOSPITAL | Age: 79
End: 2024-02-27
Payer: MEDICARE

## 2024-02-27 DIAGNOSIS — D50.9 IRON DEFICIENCY ANEMIA, UNSPECIFIED: ICD-10-CM

## 2024-02-27 LAB
FERRITIN SERPL-MCNC: 59.78 NG/ML (ref 4.63–204)
IRON SERPL-MCNC: 44 UG/DL (ref 50–170)

## 2024-02-27 PROCEDURE — 82728 ASSAY OF FERRITIN: CPT | Performed by: FAMILY MEDICINE

## 2024-02-27 PROCEDURE — 83540 ASSAY OF IRON: CPT | Performed by: FAMILY MEDICINE

## 2024-03-11 ENCOUNTER — LAB REQUISITION (OUTPATIENT)
Dept: LAB | Facility: HOSPITAL | Age: 79
End: 2024-03-11
Payer: MEDICARE

## 2024-03-11 DIAGNOSIS — N17.9 ACUTE KIDNEY FAILURE, UNSPECIFIED: ICD-10-CM

## 2024-03-11 DIAGNOSIS — I10 ESSENTIAL (PRIMARY) HYPERTENSION: ICD-10-CM

## 2024-03-11 LAB
ALBUMIN SERPL-MCNC: 3.2 G/DL (ref 3.4–4.8)
ALBUMIN/GLOB SERPL: 1.1 RATIO (ref 1.1–2)
ALP SERPL-CCNC: 99 UNIT/L (ref 40–150)
ALT SERPL-CCNC: 15 UNIT/L (ref 0–55)
AST SERPL-CCNC: 18 UNIT/L (ref 5–34)
BASOPHILS # BLD AUTO: 0.05 X10(3)/MCL
BASOPHILS NFR BLD AUTO: 0.9 %
BILIRUB SERPL-MCNC: 0.2 MG/DL
BNP BLD-MCNC: 19.8 PG/ML
BUN SERPL-MCNC: 18.4 MG/DL (ref 9.8–20.1)
CALCIUM SERPL-MCNC: 8.9 MG/DL (ref 8.4–10.2)
CHLORIDE SERPL-SCNC: 106 MMOL/L (ref 98–107)
CO2 SERPL-SCNC: 26 MMOL/L (ref 23–31)
CREAT SERPL-MCNC: 0.66 MG/DL (ref 0.55–1.02)
EOSINOPHIL # BLD AUTO: 0.25 X10(3)/MCL (ref 0–0.9)
EOSINOPHIL NFR BLD AUTO: 4.7 %
ERYTHROCYTE [DISTWIDTH] IN BLOOD BY AUTOMATED COUNT: 12.6 % (ref 11.5–17)
GFR SERPLBLD CREATININE-BSD FMLA CKD-EPI: >60 MLS/MIN/1.73/M2
GLOBULIN SER-MCNC: 2.9 GM/DL (ref 2.4–3.5)
GLUCOSE SERPL-MCNC: 82 MG/DL (ref 82–115)
HCT VFR BLD AUTO: 33 % (ref 37–47)
HGB BLD-MCNC: 11.2 G/DL (ref 12–16)
IMM GRANULOCYTES # BLD AUTO: 0.01 X10(3)/MCL (ref 0–0.04)
IMM GRANULOCYTES NFR BLD AUTO: 0.2 %
LYMPHOCYTES # BLD AUTO: 2.47 X10(3)/MCL (ref 0.6–4.6)
LYMPHOCYTES NFR BLD AUTO: 46.2 %
MCH RBC QN AUTO: 31.5 PG (ref 27–31)
MCHC RBC AUTO-ENTMCNC: 33.9 G/DL (ref 33–36)
MCV RBC AUTO: 92.7 FL (ref 80–94)
MONOCYTES # BLD AUTO: 0.47 X10(3)/MCL (ref 0.1–1.3)
MONOCYTES NFR BLD AUTO: 8.8 %
NEUTROPHILS # BLD AUTO: 2.1 X10(3)/MCL (ref 2.1–9.2)
NEUTROPHILS NFR BLD AUTO: 39.2 %
NRBC BLD AUTO-RTO: 0 %
PLATELET # BLD AUTO: 252 X10(3)/MCL (ref 130–400)
PMV BLD AUTO: 9.7 FL (ref 7.4–10.4)
POTASSIUM SERPL-SCNC: 4 MMOL/L (ref 3.5–5.1)
PROT SERPL-MCNC: 6.1 GM/DL (ref 5.8–7.6)
RBC # BLD AUTO: 3.56 X10(6)/MCL (ref 4.2–5.4)
SODIUM SERPL-SCNC: 140 MMOL/L (ref 136–145)
WBC # SPEC AUTO: 5.35 X10(3)/MCL (ref 4.5–11.5)

## 2024-03-11 PROCEDURE — 80053 COMPREHEN METABOLIC PANEL: CPT | Performed by: FAMILY MEDICINE

## 2024-03-11 PROCEDURE — 85025 COMPLETE CBC W/AUTO DIFF WBC: CPT | Performed by: FAMILY MEDICINE

## 2024-03-11 PROCEDURE — 83880 ASSAY OF NATRIURETIC PEPTIDE: CPT | Performed by: FAMILY MEDICINE

## 2024-03-13 ENCOUNTER — LAB REQUISITION (OUTPATIENT)
Dept: LAB | Facility: HOSPITAL | Age: 79
End: 2024-03-13
Payer: MEDICARE

## 2024-03-13 DIAGNOSIS — N17.9 ACUTE KIDNEY FAILURE, UNSPECIFIED: ICD-10-CM

## 2024-03-13 LAB
ALBUMIN SERPL-MCNC: 3.1 G/DL (ref 3.4–4.8)
ALBUMIN/GLOB SERPL: 1 RATIO (ref 1.1–2)
ALP SERPL-CCNC: 95 UNIT/L (ref 40–150)
ALT SERPL-CCNC: 16 UNIT/L (ref 0–55)
AST SERPL-CCNC: 20 UNIT/L (ref 5–34)
BILIRUB SERPL-MCNC: 0.2 MG/DL
BUN SERPL-MCNC: 22.7 MG/DL (ref 9.8–20.1)
CALCIUM SERPL-MCNC: 8.9 MG/DL (ref 8.4–10.2)
CHLORIDE SERPL-SCNC: 107 MMOL/L (ref 98–107)
CO2 SERPL-SCNC: 29 MMOL/L (ref 23–31)
CREAT SERPL-MCNC: 0.76 MG/DL (ref 0.55–1.02)
GFR SERPLBLD CREATININE-BSD FMLA CKD-EPI: >60 MLS/MIN/1.73/M2
GLOBULIN SER-MCNC: 3 GM/DL (ref 2.4–3.5)
GLUCOSE SERPL-MCNC: 77 MG/DL (ref 82–115)
POTASSIUM SERPL-SCNC: 4.4 MMOL/L (ref 3.5–5.1)
PROT SERPL-MCNC: 6.1 GM/DL (ref 5.8–7.6)
SODIUM SERPL-SCNC: 143 MMOL/L (ref 136–145)

## 2024-03-13 PROCEDURE — 80053 COMPREHEN METABOLIC PANEL: CPT | Performed by: NURSE PRACTITIONER

## 2024-04-16 ENCOUNTER — LAB REQUISITION (OUTPATIENT)
Dept: LAB | Facility: HOSPITAL | Age: 79
End: 2024-04-16
Payer: MEDICARE

## 2024-04-16 DIAGNOSIS — E11.21 TYPE 2 DIABETES MELLITUS WITH DIABETIC NEPHROPATHY: ICD-10-CM

## 2024-04-16 LAB
EST. AVERAGE GLUCOSE BLD GHB EST-MCNC: 102.5 MG/DL
HBA1C MFR BLD: 5.2 %

## 2024-04-16 PROCEDURE — 83036 HEMOGLOBIN GLYCOSYLATED A1C: CPT | Performed by: FAMILY MEDICINE

## 2024-05-02 ENCOUNTER — LAB REQUISITION (OUTPATIENT)
Dept: LAB | Facility: HOSPITAL | Age: 79
End: 2024-05-02
Payer: MEDICARE

## 2024-05-02 DIAGNOSIS — I50.9 HEART FAILURE, UNSPECIFIED: ICD-10-CM

## 2024-05-02 LAB
FERRITIN SERPL-MCNC: 78.38 NG/ML (ref 4.63–204)
IRON SERPL-MCNC: 64 UG/DL (ref 50–170)

## 2024-05-02 PROCEDURE — 83540 ASSAY OF IRON: CPT | Performed by: FAMILY MEDICINE

## 2024-05-02 PROCEDURE — 82728 ASSAY OF FERRITIN: CPT | Performed by: FAMILY MEDICINE

## 2024-06-04 ENCOUNTER — LAB REQUISITION (OUTPATIENT)
Dept: LAB | Facility: HOSPITAL | Age: 79
End: 2024-06-04
Payer: MEDICARE

## 2024-06-04 DIAGNOSIS — D64.9 ANEMIA, UNSPECIFIED: ICD-10-CM

## 2024-06-04 LAB
ALBUMIN SERPL-MCNC: 3.1 G/DL (ref 3.4–4.8)
ALBUMIN/GLOB SERPL: 1.1 RATIO (ref 1.1–2)
ALP SERPL-CCNC: 104 UNIT/L (ref 40–150)
ALT SERPL-CCNC: 14 UNIT/L (ref 0–55)
ANION GAP SERPL CALC-SCNC: 6 MEQ/L
AST SERPL-CCNC: 18 UNIT/L (ref 5–34)
BASOPHILS # BLD AUTO: 0.05 X10(3)/MCL
BASOPHILS NFR BLD AUTO: 1 %
BILIRUB SERPL-MCNC: 0.2 MG/DL
BUN SERPL-MCNC: 16.8 MG/DL (ref 9.8–20.1)
CALCIUM SERPL-MCNC: 8.5 MG/DL (ref 8.4–10.2)
CHLORIDE SERPL-SCNC: 107 MMOL/L (ref 98–107)
CHOLEST SERPL-MCNC: 104 MG/DL
CHOLEST/HDLC SERPL: 3 {RATIO} (ref 0–5)
CO2 SERPL-SCNC: 30 MMOL/L (ref 23–31)
CREAT SERPL-MCNC: 0.72 MG/DL (ref 0.55–1.02)
CREAT/UREA NIT SERPL: 23
EOSINOPHIL # BLD AUTO: 0.2 X10(3)/MCL (ref 0–0.9)
EOSINOPHIL NFR BLD AUTO: 4 %
ERYTHROCYTE [DISTWIDTH] IN BLOOD BY AUTOMATED COUNT: 12.7 % (ref 11.5–17)
GFR SERPLBLD CREATININE-BSD FMLA CKD-EPI: >60 ML/MIN/1.73/M2
GLOBULIN SER-MCNC: 2.9 GM/DL (ref 2.4–3.5)
GLUCOSE SERPL-MCNC: 88 MG/DL (ref 82–115)
HCT VFR BLD AUTO: 33.9 % (ref 37–47)
HDLC SERPL-MCNC: 36 MG/DL (ref 35–60)
HGB BLD-MCNC: 11.4 G/DL (ref 12–16)
IMM GRANULOCYTES # BLD AUTO: 0 X10(3)/MCL (ref 0–0.04)
IMM GRANULOCYTES NFR BLD AUTO: 0 %
LDLC SERPL CALC-MCNC: 62 MG/DL (ref 50–140)
LYMPHOCYTES # BLD AUTO: 2.43 X10(3)/MCL (ref 0.6–4.6)
LYMPHOCYTES NFR BLD AUTO: 48.6 %
MCH RBC QN AUTO: 32.2 PG (ref 27–31)
MCHC RBC AUTO-ENTMCNC: 33.6 G/DL (ref 33–36)
MCV RBC AUTO: 95.8 FL (ref 80–94)
MONOCYTES # BLD AUTO: 0.46 X10(3)/MCL (ref 0.1–1.3)
MONOCYTES NFR BLD AUTO: 9.2 %
NEUTROPHILS # BLD AUTO: 1.86 X10(3)/MCL (ref 2.1–9.2)
NEUTROPHILS NFR BLD AUTO: 37.2 %
NRBC BLD AUTO-RTO: 0 %
PLATELET # BLD AUTO: 235 X10(3)/MCL (ref 130–400)
PMV BLD AUTO: 9.7 FL (ref 7.4–10.4)
POTASSIUM SERPL-SCNC: 3.7 MMOL/L (ref 3.5–5.1)
PROT SERPL-MCNC: 6 GM/DL (ref 5.8–7.6)
RBC # BLD AUTO: 3.54 X10(6)/MCL (ref 4.2–5.4)
SODIUM SERPL-SCNC: 143 MMOL/L (ref 136–145)
TRIGL SERPL-MCNC: 31 MG/DL (ref 37–140)
TSH SERPL-ACNC: 1.46 UIU/ML (ref 0.35–4.94)
VLDLC SERPL CALC-MCNC: 6 MG/DL
WBC # SPEC AUTO: 5 X10(3)/MCL (ref 4.5–11.5)

## 2024-06-04 PROCEDURE — 80053 COMPREHEN METABOLIC PANEL: CPT | Performed by: FAMILY MEDICINE

## 2024-06-04 PROCEDURE — 85025 COMPLETE CBC W/AUTO DIFF WBC: CPT | Performed by: FAMILY MEDICINE

## 2024-06-04 PROCEDURE — 84443 ASSAY THYROID STIM HORMONE: CPT | Performed by: FAMILY MEDICINE

## 2024-06-04 PROCEDURE — 80061 LIPID PANEL: CPT | Performed by: FAMILY MEDICINE

## 2024-06-14 ENCOUNTER — LAB REQUISITION (OUTPATIENT)
Dept: LAB | Facility: HOSPITAL | Age: 79
End: 2024-06-14
Payer: MEDICARE

## 2024-06-14 DIAGNOSIS — R41.82 ALTERED MENTAL STATUS, UNSPECIFIED: ICD-10-CM

## 2024-06-14 LAB
BILIRUB UR QL STRIP.AUTO: NEGATIVE
CLARITY UR: ABNORMAL
COLOR UR AUTO: ABNORMAL
GLUCOSE UR QL STRIP: NEGATIVE
HGB UR QL STRIP: NEGATIVE
KETONES UR QL STRIP: NEGATIVE
LEUKOCYTE ESTERASE UR QL STRIP: NEGATIVE
NITRITE UR QL STRIP: NEGATIVE
PH UR STRIP: 6 [PH]
PROT UR QL STRIP: NEGATIVE
SP GR UR STRIP.AUTO: >=1.03 (ref 1–1.03)
UROBILINOGEN UR STRIP-ACNC: 0.2

## 2024-06-14 PROCEDURE — 81003 URINALYSIS AUTO W/O SCOPE: CPT | Performed by: FAMILY MEDICINE

## 2024-07-10 ENCOUNTER — LAB REQUISITION (OUTPATIENT)
Dept: LAB | Facility: HOSPITAL | Age: 79
End: 2024-07-10
Payer: MEDICARE

## 2024-07-10 DIAGNOSIS — N18.9 CHRONIC KIDNEY DISEASE, UNSPECIFIED: ICD-10-CM

## 2024-07-10 DIAGNOSIS — E56.9 VITAMIN DEFICIENCY, UNSPECIFIED: ICD-10-CM

## 2024-07-10 DIAGNOSIS — I10 ESSENTIAL (PRIMARY) HYPERTENSION: ICD-10-CM

## 2024-07-10 DIAGNOSIS — E66.9 OBESITY, UNSPECIFIED: ICD-10-CM

## 2024-07-10 LAB
ALBUMIN SERPL-MCNC: 3.1 G/DL (ref 3.4–4.8)
ALBUMIN/GLOB SERPL: 1 RATIO (ref 1.1–2)
ALP SERPL-CCNC: 82 UNIT/L (ref 40–150)
ALT SERPL-CCNC: 20 UNIT/L (ref 0–55)
ANION GAP SERPL CALC-SCNC: 12 MEQ/L
AST SERPL-CCNC: 29 UNIT/L (ref 5–34)
BASOPHILS # BLD AUTO: 0.04 X10(3)/MCL
BASOPHILS NFR BLD AUTO: 0.9 %
BILIRUB SERPL-MCNC: 0.2 MG/DL
BUN SERPL-MCNC: 16.5 MG/DL (ref 9.8–20.1)
CALCIUM SERPL-MCNC: 9 MG/DL (ref 8.4–10.2)
CHLORIDE SERPL-SCNC: 108 MMOL/L (ref 98–107)
CHOLEST SERPL-MCNC: 113 MG/DL
CHOLEST/HDLC SERPL: 4 {RATIO} (ref 0–5)
CO2 SERPL-SCNC: 25 MMOL/L (ref 23–31)
CREAT SERPL-MCNC: 0.76 MG/DL (ref 0.55–1.02)
CREAT/UREA NIT SERPL: 22
EOSINOPHIL # BLD AUTO: 0.17 X10(3)/MCL (ref 0–0.9)
EOSINOPHIL NFR BLD AUTO: 3.7 %
ERYTHROCYTE [DISTWIDTH] IN BLOOD BY AUTOMATED COUNT: 13.1 % (ref 11.5–17)
EST. AVERAGE GLUCOSE BLD GHB EST-MCNC: 105.4 MG/DL
GFR SERPLBLD CREATININE-BSD FMLA CKD-EPI: >60 ML/MIN/1.73/M2
GLOBULIN SER-MCNC: 3.2 GM/DL (ref 2.4–3.5)
GLUCOSE SERPL-MCNC: 67 MG/DL (ref 82–115)
HBA1C MFR BLD: 5.3 %
HCT VFR BLD AUTO: 33.7 % (ref 37–47)
HDLC SERPL-MCNC: 32 MG/DL (ref 35–60)
HGB BLD-MCNC: 11 G/DL (ref 12–16)
IMM GRANULOCYTES # BLD AUTO: 0.02 X10(3)/MCL (ref 0–0.04)
IMM GRANULOCYTES NFR BLD AUTO: 0.4 %
LDLC SERPL CALC-MCNC: 72 MG/DL (ref 50–140)
LYMPHOCYTES # BLD AUTO: 2.09 X10(3)/MCL (ref 0.6–4.6)
LYMPHOCYTES NFR BLD AUTO: 45.5 %
MCH RBC QN AUTO: 31.2 PG (ref 27–31)
MCHC RBC AUTO-ENTMCNC: 32.6 G/DL (ref 33–36)
MCV RBC AUTO: 95.5 FL (ref 80–94)
MONOCYTES # BLD AUTO: 0.45 X10(3)/MCL (ref 0.1–1.3)
MONOCYTES NFR BLD AUTO: 9.8 %
NEUTROPHILS # BLD AUTO: 1.82 X10(3)/MCL (ref 2.1–9.2)
NEUTROPHILS NFR BLD AUTO: 39.7 %
NRBC BLD AUTO-RTO: 0 %
PLATELET # BLD AUTO: 242 X10(3)/MCL (ref 130–400)
PMV BLD AUTO: 9.8 FL (ref 7.4–10.4)
POTASSIUM SERPL-SCNC: 3.8 MMOL/L (ref 3.5–5.1)
PROT SERPL-MCNC: 6.3 GM/DL (ref 5.8–7.6)
RBC # BLD AUTO: 3.53 X10(6)/MCL (ref 4.2–5.4)
SODIUM SERPL-SCNC: 145 MMOL/L (ref 136–145)
TRIGL SERPL-MCNC: 47 MG/DL (ref 37–140)
TSH SERPL-ACNC: 0.95 UIU/ML (ref 0.35–4.94)
VLDLC SERPL CALC-MCNC: 9 MG/DL
WBC # BLD AUTO: 4.59 X10(3)/MCL (ref 4.5–11.5)

## 2024-07-10 PROCEDURE — 80061 LIPID PANEL: CPT | Performed by: FAMILY MEDICINE

## 2024-07-10 PROCEDURE — 80053 COMPREHEN METABOLIC PANEL: CPT | Performed by: FAMILY MEDICINE

## 2024-07-10 PROCEDURE — 85025 COMPLETE CBC W/AUTO DIFF WBC: CPT | Performed by: FAMILY MEDICINE

## 2024-07-10 PROCEDURE — 84443 ASSAY THYROID STIM HORMONE: CPT | Performed by: FAMILY MEDICINE

## 2024-07-10 PROCEDURE — 83036 HEMOGLOBIN GLYCOSYLATED A1C: CPT | Performed by: FAMILY MEDICINE

## 2024-09-27 DIAGNOSIS — G30.9 ALZHEIMER'S DISEASE: Primary | ICD-10-CM

## 2024-09-27 DIAGNOSIS — F02.80 ALZHEIMER'S DISEASE: Primary | ICD-10-CM

## 2024-10-01 ENCOUNTER — LAB REQUISITION (OUTPATIENT)
Dept: LAB | Facility: HOSPITAL | Age: 79
End: 2024-10-01
Payer: MEDICARE

## 2024-10-01 DIAGNOSIS — E11.9 TYPE 2 DIABETES MELLITUS WITHOUT COMPLICATIONS: ICD-10-CM

## 2024-10-01 LAB
EST. AVERAGE GLUCOSE BLD GHB EST-MCNC: 99.7 MG/DL
HBA1C MFR BLD: 5.1 %

## 2024-10-01 PROCEDURE — 83036 HEMOGLOBIN GLYCOSYLATED A1C: CPT | Performed by: FAMILY MEDICINE

## 2024-10-23 ENCOUNTER — HOSPITAL ENCOUNTER (OUTPATIENT)
Dept: RADIOLOGY | Facility: HOSPITAL | Age: 79
Discharge: HOME | End: 2024-10-23
Payer: MEDICARE

## 2024-10-23 DIAGNOSIS — G30.9 ALZHEIMER'S DISEASE: ICD-10-CM

## 2024-10-23 DIAGNOSIS — F02.80 ALZHEIMER'S DISEASE: ICD-10-CM

## 2024-10-23 PROCEDURE — 70470 CT HEAD/BRAIN W/O & W/DYE: CPT | Mod: TC

## 2024-10-23 PROCEDURE — 25500020 PHARM REV CODE 255

## 2024-10-23 RX ADMIN — IOHEXOL 80 ML: 350 INJECTION, SOLUTION INTRAVENOUS at 09:10

## 2024-10-30 ENCOUNTER — LAB REQUISITION (OUTPATIENT)
Dept: LAB | Facility: HOSPITAL | Age: 79
End: 2024-10-30
Payer: MEDICARE

## 2024-10-30 DIAGNOSIS — R79.89 OTHER SPECIFIED ABNORMAL FINDINGS OF BLOOD CHEMISTRY: ICD-10-CM

## 2024-10-30 LAB
BUN SERPL-MCNC: 17.4 MG/DL (ref 9.8–20.1)
CREAT SERPL-MCNC: 0.8 MG/DL (ref 0.55–1.02)
GFR SERPLBLD CREATININE-BSD FMLA CKD-EPI: >60 ML/MIN/1.73/M2

## 2024-10-30 PROCEDURE — 84520 ASSAY OF UREA NITROGEN: CPT | Performed by: FAMILY MEDICINE

## 2024-10-30 PROCEDURE — 82565 ASSAY OF CREATININE: CPT | Performed by: FAMILY MEDICINE

## 2024-12-02 ENCOUNTER — LAB REQUISITION (OUTPATIENT)
Dept: LAB | Facility: HOSPITAL | Age: 79
End: 2024-12-02
Payer: MEDICARE

## 2024-12-02 DIAGNOSIS — I10 ESSENTIAL (PRIMARY) HYPERTENSION: ICD-10-CM

## 2024-12-02 LAB
ALBUMIN SERPL-MCNC: 3.1 G/DL (ref 3.4–4.8)
ALBUMIN/GLOB SERPL: 1 RATIO (ref 1.1–2)
ALP SERPL-CCNC: 102 UNIT/L (ref 40–150)
ALT SERPL-CCNC: 14 UNIT/L (ref 0–55)
ANION GAP SERPL CALC-SCNC: 7 MEQ/L
AST SERPL-CCNC: 18 UNIT/L (ref 5–34)
BILIRUB SERPL-MCNC: 0.2 MG/DL
BUN SERPL-MCNC: 20.9 MG/DL (ref 9.8–20.1)
CALCIUM SERPL-MCNC: 8.7 MG/DL (ref 8.4–10.2)
CHLORIDE SERPL-SCNC: 111 MMOL/L (ref 98–107)
CHOLEST SERPL-MCNC: 105 MG/DL
CHOLEST/HDLC SERPL: 4 {RATIO} (ref 0–5)
CO2 SERPL-SCNC: 27 MMOL/L (ref 23–31)
CREAT SERPL-MCNC: 0.77 MG/DL (ref 0.55–1.02)
CREAT/UREA NIT SERPL: 27
GFR SERPLBLD CREATININE-BSD FMLA CKD-EPI: >60 ML/MIN/1.73/M2
GLOBULIN SER-MCNC: 3.1 GM/DL (ref 2.4–3.5)
GLUCOSE SERPL-MCNC: 80 MG/DL (ref 82–115)
HDLC SERPL-MCNC: 29 MG/DL (ref 35–60)
LDLC SERPL CALC-MCNC: 64 MG/DL (ref 50–140)
POTASSIUM SERPL-SCNC: 4 MMOL/L (ref 3.5–5.1)
PROT SERPL-MCNC: 6.2 GM/DL (ref 5.8–7.6)
SODIUM SERPL-SCNC: 145 MMOL/L (ref 136–145)
TRIGL SERPL-MCNC: 59 MG/DL (ref 37–140)
VLDLC SERPL CALC-MCNC: 12 MG/DL

## 2024-12-02 PROCEDURE — 80053 COMPREHEN METABOLIC PANEL: CPT | Performed by: FAMILY MEDICINE

## 2024-12-02 PROCEDURE — 80061 LIPID PANEL: CPT | Performed by: FAMILY MEDICINE

## 2025-01-13 ENCOUNTER — HOSPITAL ENCOUNTER (EMERGENCY)
Facility: HOSPITAL | Age: 80
Discharge: HOME | End: 2025-01-13
Attending: EMERGENCY MEDICINE
Payer: MEDICARE

## 2025-01-13 VITALS
DIASTOLIC BLOOD PRESSURE: 97 MMHG | OXYGEN SATURATION: 99 % | BODY MASS INDEX: 33.13 KG/M2 | HEART RATE: 85 BPM | SYSTOLIC BLOOD PRESSURE: 188 MMHG | RESPIRATION RATE: 20 BRPM | TEMPERATURE: 98 F | HEIGHT: 62 IN | WEIGHT: 180 LBS

## 2025-01-13 DIAGNOSIS — R53.1 WEAKNESS: ICD-10-CM
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
## 2025-01-13 LAB
ALBUMIN SERPL-MCNC: 3.9 G/DL (ref 3.4–4.8)
ALBUMIN/GLOB SERPL: 1.1 RATIO (ref 1.1–2)
ALP SERPL-CCNC: 124 UNIT/L (ref 40–150)
ALT SERPL-CCNC: 16 UNIT/L (ref 0–55)
AMORPH URATE CRY URNS QL MICRO: ABNORMAL /UL
ANION GAP SERPL CALC-SCNC: 10 MEQ/L
AST SERPL-CCNC: 22 UNIT/L (ref 5–34)
BACTERIA #/AREA URNS AUTO: ABNORMAL /HPF
BASOPHILS # BLD AUTO: 0.05 X10(3)/MCL
BASOPHILS NFR BLD AUTO: 0.7 %
BILIRUB SERPL-MCNC: 0.4 MG/DL
BILIRUB UR QL STRIP.AUTO: NEGATIVE
BUN SERPL-MCNC: 18.8 MG/DL (ref 9.8–20.1)
CALCIUM SERPL-MCNC: 9.5 MG/DL (ref 8.4–10.2)
CHLORIDE SERPL-SCNC: 105 MMOL/L (ref 98–107)
CLARITY UR: ABNORMAL
CO2 SERPL-SCNC: 24 MMOL/L (ref 23–31)
COLOR UR AUTO: YELLOW
CREAT SERPL-MCNC: 0.82 MG/DL (ref 0.55–1.02)
CREAT/UREA NIT SERPL: 23
EOSINOPHIL # BLD AUTO: 0.01 X10(3)/MCL (ref 0–0.9)
EOSINOPHIL NFR BLD AUTO: 0.1 %
ERYTHROCYTE [DISTWIDTH] IN BLOOD BY AUTOMATED COUNT: 13.4 % (ref 11.5–17)
FLUAV AG UPPER RESP QL IA.RAPID: NOT DETECTED
FLUBV AG UPPER RESP QL IA.RAPID: NOT DETECTED
GFR SERPLBLD CREATININE-BSD FMLA CKD-EPI: >60 ML/MIN/1.73/M2
GLOBULIN SER-MCNC: 3.7 GM/DL (ref 2.4–3.5)
GLUCOSE SERPL-MCNC: 102 MG/DL (ref 82–115)
GLUCOSE UR QL STRIP: NORMAL
HCT VFR BLD AUTO: 37.1 % (ref 37–47)
HGB BLD-MCNC: 12.4 G/DL (ref 12–16)
HGB UR QL STRIP: NEGATIVE
IMM GRANULOCYTES # BLD AUTO: 0.01 X10(3)/MCL (ref 0–0.04)
IMM GRANULOCYTES NFR BLD AUTO: 0.1 %
KETONES UR QL STRIP: ABNORMAL
LACTATE SERPL-SCNC: 1.3 MMOL/L (ref 0.5–2.2)
LEUKOCYTE ESTERASE UR QL STRIP: NEGATIVE
LYMPHOCYTES # BLD AUTO: 1.36 X10(3)/MCL (ref 0.6–4.6)
LYMPHOCYTES NFR BLD AUTO: 20.3 %
MAGNESIUM SERPL-MCNC: 2 MG/DL (ref 1.6–2.6)
MCH RBC QN AUTO: 30.5 PG (ref 27–31)
MCHC RBC AUTO-ENTMCNC: 33.4 G/DL (ref 33–36)
MCV RBC AUTO: 91.2 FL (ref 80–94)
MONOCYTES # BLD AUTO: 0.46 X10(3)/MCL (ref 0.1–1.3)
MONOCYTES NFR BLD AUTO: 6.9 %
NEUTROPHILS # BLD AUTO: 4.81 X10(3)/MCL (ref 2.1–9.2)
NEUTROPHILS NFR BLD AUTO: 71.9 %
NITRITE UR QL STRIP: NEGATIVE
NRBC BLD AUTO-RTO: 0 %
OHS QRS DURATION: 82 MS
OHS QTC CALCULATION: 479 MS
PH UR STRIP: 6 [PH]
PLATELET # BLD AUTO: 262 X10(3)/MCL (ref 130–400)
PMV BLD AUTO: 9.7 FL (ref 7.4–10.4)
POCT GLUCOSE: 99 MG/DL (ref 70–110)
POTASSIUM SERPL-SCNC: 4 MMOL/L (ref 3.5–5.1)
PROT SERPL-MCNC: 7.6 GM/DL (ref 5.8–7.6)
PROT UR QL STRIP: ABNORMAL
RBC # BLD AUTO: 4.07 X10(6)/MCL (ref 4.2–5.4)
RBC #/AREA URNS AUTO: ABNORMAL /HPF
RSV A 5' UTR RNA NPH QL NAA+PROBE: NOT DETECTED
SARS-COV-2 RNA RESP QL NAA+PROBE: NOT DETECTED
SODIUM SERPL-SCNC: 139 MMOL/L (ref 136–145)
SP GR UR STRIP.AUTO: 1.02 (ref 1–1.03)
SQUAMOUS #/AREA URNS LPF: ABNORMAL /HPF
TROPONIN I SERPL-MCNC: 0.01 NG/ML (ref 0–0.04)
UROBILINOGEN UR STRIP-ACNC: 4
VALPROATE SERPL-MCNC: 29.3 UG/ML (ref 50–100)
WBC # BLD AUTO: 6.7 X10(3)/MCL (ref 4.5–11.5)
WBC #/AREA URNS AUTO: ABNORMAL /HPF

## 2025-01-13 PROCEDURE — 82962 GLUCOSE BLOOD TEST: CPT

## 2025-01-13 PROCEDURE — 80164 ASSAY DIPROPYLACETIC ACD TOT: CPT | Performed by: EMERGENCY MEDICINE

## 2025-01-13 PROCEDURE — 63600175 PHARM REV CODE 636 W HCPCS: Performed by: EMERGENCY MEDICINE

## 2025-01-13 PROCEDURE — 87040 BLOOD CULTURE FOR BACTERIA: CPT | Performed by: EMERGENCY MEDICINE

## 2025-01-13 PROCEDURE — 93010 ELECTROCARDIOGRAM REPORT: CPT | Mod: ,,, | Performed by: STUDENT IN AN ORGANIZED HEALTH CARE EDUCATION/TRAINING PROGRAM

## 2025-01-13 PROCEDURE — 96360 HYDRATION IV INFUSION INIT: CPT

## 2025-01-13 PROCEDURE — 81001 URINALYSIS AUTO W/SCOPE: CPT | Performed by: EMERGENCY MEDICINE

## 2025-01-13 PROCEDURE — 80053 COMPREHEN METABOLIC PANEL: CPT | Performed by: EMERGENCY MEDICINE

## 2025-01-13 PROCEDURE — 83605 ASSAY OF LACTIC ACID: CPT | Performed by: EMERGENCY MEDICINE

## 2025-01-13 PROCEDURE — 83735 ASSAY OF MAGNESIUM: CPT | Performed by: EMERGENCY MEDICINE

## 2025-01-13 PROCEDURE — 84484 ASSAY OF TROPONIN QUANT: CPT | Performed by: EMERGENCY MEDICINE

## 2025-01-13 PROCEDURE — 93005 ELECTROCARDIOGRAM TRACING: CPT

## 2025-01-13 PROCEDURE — 85025 COMPLETE CBC W/AUTO DIFF WBC: CPT | Performed by: EMERGENCY MEDICINE

## 2025-01-13 PROCEDURE — 0241U COVID/RSV/FLU A&B PCR: CPT | Performed by: EMERGENCY MEDICINE

## 2025-01-13 PROCEDURE — 99285 EMERGENCY DEPT VISIT HI MDM: CPT | Mod: 25

## 2025-01-13 RX ORDER — QUETIAPINE FUMARATE 25 MG/1
25 TABLET, FILM COATED ORAL NIGHTLY
COMMUNITY
Start: 2025-01-03

## 2025-01-13 RX ORDER — QUETIAPINE FUMARATE 200 MG/1
200 TABLET, FILM COATED ORAL NIGHTLY
COMMUNITY
Start: 2025-01-02

## 2025-01-13 RX ORDER — FLUOXETINE 10 MG/1
10 CAPSULE ORAL DAILY
COMMUNITY
Start: 2025-01-07

## 2025-01-13 RX ORDER — DIVALPROEX SODIUM 250 MG/1
250 TABLET, FILM COATED, EXTENDED RELEASE ORAL DAILY
COMMUNITY
Start: 2025-01-02

## 2025-01-13 RX ADMIN — SODIUM CHLORIDE, POTASSIUM CHLORIDE, SODIUM LACTATE AND CALCIUM CHLORIDE 1000 ML: 600; 310; 30; 20 INJECTION, SOLUTION INTRAVENOUS at 10:01

## 2025-01-13 NOTE — ED PROVIDER NOTES
Encounter Date: 1/13/2025    SCRIBE #1 NOTE: I, Verito Bain, am scribing for, and in the presence of,  Magdaleno Dang III, MD. I have scribed the following portions of the note - the EKG reading. Other sections scribed: HPI, ROS, PE.       History     Chief Complaint   Patient presents with    Fall     Pt sent from [facility] for unwitnessed fall. Unknown LOC -BT GCS 14 at baseline hx dementia     The patient is a 79 year old female with a history of Alzheimer's disease, HTN, heart failure, DM, and metabolic encephalopathy presenting to the ED via EMS from [facility] following an unwitnessed fall this morning. Per EMS, the patient was found on the ground in her room this morning by the nursing home staff; it is unknown if the patient lost consciousness, the patient is not on blood thinners, and she is a baseline GCS of 14. The patient's history is limited due to her history of dementia.     The history is provided by the EMS personnel and the nursing home. The history is limited by the absence of a caregiver. No  was used.     Review of patient's allergies indicates:  No Known Allergies  Past Medical History:   Diagnosis Date    Cellulitis     Hypertension      Past Surgical History:   Procedure Laterality Date    HYSTERECTOMY       Family History   Problem Relation Name Age of Onset    Obesity Mother      Heart disease Father      Obesity Father      Dementia Maternal Aunt      Aneurysm Neg Hx      Cancer Neg Hx      Clotting disorder Neg Hx      Diabetes Neg Hx      Fainting Neg Hx      Hyperlipidemia Neg Hx      Kidney disease Neg Hx      Liver disease Neg Hx      Migraines Neg Hx      Neuropathy Neg Hx      Parkinsonism Neg Hx      Seizures Neg Hx      Stroke Neg Hx      Tremor Neg Hx       Social History     Tobacco Use    Smoking status: Former     Passive exposure: Never    Smokeless tobacco: Never   Substance Use Topics    Alcohol use: Not Currently     Comment:  occ    Drug use: Not Currently     Review of Systems   Unable to perform ROS: Dementia       Physical Exam     Initial Vitals [01/13/25 0903]   BP Pulse Resp Temp SpO2   (!) 172/70 80 18 97.5 °F (36.4 °C) 96 %      MAP       --         Physical Exam    Nursing note and vitals reviewed.  Constitutional: She appears well-developed and well-nourished.   Smells of urine.    HENT:   Head: Normocephalic and atraumatic. Mouth/Throat: Oropharynx is clear and moist.   Eyes: Conjunctivae are normal. Pupils are equal, round, and reactive to light.   Pupils are 4 mm to 2 mm, brisk, bilateral.    Neck: Neck supple.   Normal range of motion.  Cardiovascular:  Normal rate, regular rhythm and normal heart sounds.           Pulmonary/Chest: Breath sounds normal. She has no wheezes. She has no rhonchi. She has no rales.   Chest is clear.    Abdominal: Bowel sounds are normal.   Abdomen is soft.    Musculoskeletal:         General: Normal range of motion.      Cervical back: Normal range of motion and neck supple.      Comments: Contracted lower extremities.      Neurological: She is alert. GCS score is 15. GCS eye subscore is 4. GCS verbal subscore is 5. GCS motor subscore is 6.   Oriented x1; confused.    Skin: Skin is warm and dry. Capillary refill takes less than 2 seconds.   Psychiatric: She has a normal mood and affect. Her behavior is normal. Judgment and thought content normal.         ED Course   Procedures  Labs Reviewed   COMPREHENSIVE METABOLIC PANEL - Abnormal       Result Value    Sodium 139      Potassium 4.0      Chloride 105      CO2 24      Glucose 102      Blood Urea Nitrogen 18.8      Creatinine 0.82      Calcium 9.5      Protein Total 7.6      Albumin 3.9      Globulin 3.7 (*)     Albumin/Globulin Ratio 1.1      Bilirubin Total 0.4            ALT 16      AST 22      eGFR >60      Anion Gap 10.0      BUN/Creatinine Ratio 23     VALPROIC ACID - Abnormal    Valproic Acid 29.3 (*)    CBC WITH DIFFERENTIAL -  Abnormal    WBC 6.70      RBC 4.07 (*)     Hgb 12.4      Hct 37.1      MCV 91.2      MCH 30.5      MCHC 33.4      RDW 13.4      Platelet 262      MPV 9.7      Neut % 71.9      Lymph % 20.3      Mono % 6.9      Eos % 0.1      Basophil % 0.7      Imm Grans % 0.1      Neut # 4.81      Lymph # 1.36      Mono # 0.46      Eos # 0.01      Baso # 0.05      Imm Gran # 0.01      NRBC% 0.0     LACTIC ACID, PLASMA - Normal    Lactic Acid Level 1.3     COVID/RSV/FLU A&B PCR - Normal    Influenza A PCR Not Detected      Influenza B PCR Not Detected      Respiratory Syncytial Virus PCR Not Detected      SARS-CoV-2 PCR Not Detected      Narrative:     The Xpert Xpress SARS-CoV-2/FLU/RSV plus is a rapid, multiplexed real-time PCR test intended for the simultaneous qualitative detection and differentiation of SARS-CoV-2, Influenza A, Influenza B, and respiratory syncytial virus (RSV) viral RNA in either nasopharyngeal swab or nasal swab specimens.         TROPONIN I - Normal    Troponin-I 0.013     MAGNESIUM - Normal    Magnesium Level 2.00     BLOOD CULTURE OLG   BLOOD CULTURE OLG   CBC W/ AUTO DIFFERENTIAL    Narrative:     The following orders were created for panel order CBC Auto Differential.  Procedure                               Abnormality         Status                     ---------                               -----------         ------                     CBC with Differential[[id number]]       Abnormal            Final result                 Please view results for these tests on the individual orders.   URINALYSIS, REFLEX TO URINE CULTURE   POCT GLUCOSE    POCT Glucose 99       EKG Readings: (Independently Interpreted)   Initial Reading: No STEMI. Rhythm: Normal Sinus Rhythm. Heart Rate: 79. Ectopy: No Ectopy. Conduction: Normal. ST Segments: Normal ST Segments. T Waves: Normal. Axis: Normal. Clinical Impression: Normal Sinus Rhythm   EKG completed at 0951.      ECG Results              EKG 12-lead (Final result)         Collection Time Result Time QRS Duration OHS QTC Calculation    01/13/25 09:51:52 01/13/25 11:09:07 82 479                     Final result by Interface, Lab In [facility] [place] (01/13/25 11:09:14)                   Narrative:    Test Reason : R53.1,    Vent. Rate :  79 BPM     Atrial Rate :  79 BPM     P-R Int : 172 ms          QRS Dur :  82 ms      QT Int : 418 ms       P-R-T Axes :  64  28  67 degrees    QTcB Int : 479 ms    Normal sinus rhythm  Normal ECG  When compared with ECG of 30-May-2023 03:31,  T wave inversion no longer evident in Inferior leads  QT has lengthened  Confirmed by Claritza Thomas ([id number]) on 1/13/2025 11:09:01 AM    Referred By: IVÁN COX           Confirmed By: Claritza Thomas                                  Imaging Results              CT Head Without Contrast (Final result)  Result time 01/13/25 10:08:53      Final result by Cuco Posey MD (01/13/25 10:08:53)                   Impression:      1.  No acute intracranial findings identified.    2.  Chronic microangiopathic ischemia and atrophy.      Electronically signed by: Cuco Posey  Date:    01/13/2025  Time:    10:08               Narrative:    EXAMINATION:  CT HEAD WITHOUT CONTRAST    CLINICAL HISTORY:  Neuro deficit, acute, stroke suspected;    TECHNIQUE:  Sequential axial images were performed of the brain without contrast.    Dose product length of 918 mGycm. Automated exposure control was utilized to minimize radiation dose.    COMPARISON:  October 23, 2024.    FINDINGS:  There is no intracranial mass effect, midline shift, hydrocephalus or hemorrhage. There is no sulcal effacement or low attenuation changes to suggest recent large vessel territory infarction. Chronic appearing periventricular and subcortical white matter low attenuation changes are present and are consistent with chronic microangiopathic ischemia. The ventricular system and sulcal markings prominence is consistent with atrophy. There is no acute extra  axial fluid collection. Visualized paranasal sinuses are clear without mucosal thickening, polypoidal abnormality or air-fluid levels. Mastoid air cells aeration is optimal.                                       Medications   lactated ringers bolus 1,000 mL (0 mLs Intravenous Stopped 1/13/25 1121)     Medical Decision Making  The differential diagnosis includes, but is not limited to, hypoglycemia, renal dysfunction, renal insufficiency, CVA, and UTI.       CT head without any abnormality CBC chemistry also normal patient at neurologic baseline per nursing will be discharged    Problems Addressed:  Weakness: complicated acute illness or injury that poses a threat to life or bodily functions    Amount and/or Complexity of Data Reviewed  Independent Historian: EMS     Details: Per EMS, the patient was found on the ground in her room this morning by the nursing home staff; it is unknown if the patient lost consciousness, the patient is not on blood thinners, and she is a baseline GCS of 14. The patient's history is limited due to her history of dementia.   Labs: ordered.  Radiology: ordered.  ECG/medicine tests: ordered and independent interpretation performed.     Details: EKG completed at 0951. Initial Reading: No STEMI. Rhythm: Normal Sinus Rhythm. Heart Rate: 79. Ectopy: No Ectopy. Conduction: Normal. ST Segments: Normal ST Segments. T Waves: Normal. Axis: Normal. Clinical Impression: Normal Sinus Rhythm                  Scribe Attestation:   Scribe #1: I performed the above scribed service and the documentation accurately describes the services I performed. I attest to the accuracy of the note.    Attending Attestation:           Physician Attestation for Scribe:  Physician Attestation Statement for Scribe #1: I, Magdaleno Dang III, MD, reviewed documentation, as scribed by Verito Bain in my presence, and it is both accurate and complete.                                    Clinical Impression:  Final  diagnoses:  [R53.1] Weakness  [W19.XXXA] Fall, initial encounter (Primary)          ED Disposition Condition    Discharge Stable          ED Prescriptions    None       Follow-up Information       Follow up With Specialties Details Why Contact Info    PCP  In 3 days               Magdaleno Dang III, MD  01/13/25 1275

## 2025-01-18 LAB
BACTERIA BLD CULT: NORMAL
BACTERIA BLD CULT: NORMAL

## 2025-02-21 ENCOUNTER — LAB REQUISITION (OUTPATIENT)
Dept: LAB | Facility: HOSPITAL | Age: 80
End: 2025-02-21
Payer: MEDICARE

## 2025-02-21 DIAGNOSIS — G30.9 ALZHEIMER'S DISEASE, UNSPECIFIED (CODE): ICD-10-CM

## 2025-02-21 LAB — VALPROATE SERPL-MCNC: 27 UG/ML (ref 50–100)

## 2025-02-21 PROCEDURE — 80164 ASSAY DIPROPYLACETIC ACD TOT: CPT | Performed by: FAMILY MEDICINE

## 2025-03-19 ENCOUNTER — LAB REQUISITION (OUTPATIENT)
Dept: LAB | Facility: HOSPITAL | Age: 80
End: 2025-03-19
Payer: MEDICARE

## 2025-03-19 DIAGNOSIS — G40.901 EPILEPSY, UNSPECIFIED, NOT INTRACTABLE, WITH STATUS EPILEPTICUS: ICD-10-CM

## 2025-03-19 DIAGNOSIS — F02.818 DEMENTIA IN OTHER DISEASES CLASSIFIED ELSEWHERE, UNSPECIFIED SEVERITY, WITH OTHER BEHAVIORAL DISTURBANCE: ICD-10-CM

## 2025-03-19 LAB — VALPROATE SERPL-MCNC: 23.9 UG/ML (ref 50–100)

## 2025-03-19 PROCEDURE — 80164 ASSAY DIPROPYLACETIC ACD TOT: CPT | Performed by: FAMILY MEDICINE

## 2025-04-02 ENCOUNTER — LAB REQUISITION (OUTPATIENT)
Dept: LAB | Facility: HOSPITAL | Age: 80
End: 2025-04-02
Payer: MEDICARE

## 2025-04-02 DIAGNOSIS — D64.9 ANEMIA, UNSPECIFIED: ICD-10-CM

## 2025-04-02 LAB
EST. AVERAGE GLUCOSE BLD GHB EST-MCNC: 108.3 MG/DL
HBA1C MFR BLD: 5.4 %

## 2025-04-02 PROCEDURE — 83036 HEMOGLOBIN GLYCOSYLATED A1C: CPT | Performed by: FAMILY MEDICINE

## 2025-04-08 ENCOUNTER — LAB REQUISITION (OUTPATIENT)
Dept: LAB | Facility: HOSPITAL | Age: 80
End: 2025-04-08
Payer: MEDICARE

## 2025-04-08 DIAGNOSIS — R45.1 RESTLESSNESS AND AGITATION: ICD-10-CM

## 2025-04-08 LAB
ALBUMIN SERPL-MCNC: 3.2 G/DL (ref 3.4–4.8)
ALBUMIN/GLOB SERPL: 0.9 RATIO (ref 1.1–2)
ALP SERPL-CCNC: 96 UNIT/L (ref 40–150)
ALT SERPL-CCNC: 12 UNIT/L (ref 0–55)
ANION GAP SERPL CALC-SCNC: 8 MEQ/L
AST SERPL-CCNC: 23 UNIT/L (ref 11–45)
BASOPHILS # BLD AUTO: 0.03 X10(3)/MCL
BASOPHILS NFR BLD AUTO: 0.6 %
BILIRUB SERPL-MCNC: 0.2 MG/DL
BUN SERPL-MCNC: 14.5 MG/DL (ref 9.8–20.1)
CALCIUM SERPL-MCNC: 8.4 MG/DL (ref 8.4–10.2)
CHLORIDE SERPL-SCNC: 107 MMOL/L (ref 98–107)
CO2 SERPL-SCNC: 23 MMOL/L (ref 23–31)
CREAT SERPL-MCNC: 0.84 MG/DL (ref 0.55–1.02)
CREAT/UREA NIT SERPL: 17
EOSINOPHIL # BLD AUTO: 0.02 X10(3)/MCL (ref 0–0.9)
EOSINOPHIL NFR BLD AUTO: 0.4 %
ERYTHROCYTE [DISTWIDTH] IN BLOOD BY AUTOMATED COUNT: 13.7 % (ref 11.5–17)
GFR SERPLBLD CREATININE-BSD FMLA CKD-EPI: >60 ML/MIN/1.73/M2
GLOBULIN SER-MCNC: 3.6 GM/DL (ref 2.4–3.5)
GLUCOSE SERPL-MCNC: 115 MG/DL (ref 82–115)
HCT VFR BLD AUTO: 33.3 % (ref 37–47)
HGB BLD-MCNC: 11.2 G/DL (ref 12–16)
IMM GRANULOCYTES # BLD AUTO: 0.01 X10(3)/MCL (ref 0–0.04)
IMM GRANULOCYTES NFR BLD AUTO: 0.2 %
LYMPHOCYTES # BLD AUTO: 0.74 X10(3)/MCL (ref 0.6–4.6)
LYMPHOCYTES NFR BLD AUTO: 14.7 %
MCH RBC QN AUTO: 30.6 PG (ref 27–31)
MCHC RBC AUTO-ENTMCNC: 33.6 G/DL (ref 33–36)
MCV RBC AUTO: 91 FL (ref 80–94)
MONOCYTES # BLD AUTO: 0.78 X10(3)/MCL (ref 0.1–1.3)
MONOCYTES NFR BLD AUTO: 15.4 %
NEUTROPHILS # BLD AUTO: 3.47 X10(3)/MCL (ref 2.1–9.2)
NEUTROPHILS NFR BLD AUTO: 68.7 %
NRBC BLD AUTO-RTO: 0 %
PLATELET # BLD AUTO: 237 X10(3)/MCL (ref 130–400)
PMV BLD AUTO: 11.3 FL (ref 7.4–10.4)
POTASSIUM SERPL-SCNC: 4.5 MMOL/L (ref 3.5–5.1)
PROT SERPL-MCNC: 6.8 GM/DL (ref 5.8–7.6)
RBC # BLD AUTO: 3.66 X10(6)/MCL (ref 4.2–5.4)
SODIUM SERPL-SCNC: 138 MMOL/L (ref 136–145)
WBC # BLD AUTO: 5.05 X10(3)/MCL (ref 4.5–11.5)

## 2025-04-08 PROCEDURE — 80053 COMPREHEN METABOLIC PANEL: CPT | Performed by: FAMILY MEDICINE

## 2025-04-08 PROCEDURE — 85025 COMPLETE CBC W/AUTO DIFF WBC: CPT | Performed by: FAMILY MEDICINE

## 2025-06-23 ENCOUNTER — HOSPITAL ENCOUNTER (INPATIENT)
Facility: HOSPITAL | Age: 80
LOS: 1 days | Discharge: HOME | DRG: 092 | End: 2025-06-26
Attending: EMERGENCY MEDICINE | Admitting: INTERNAL MEDICINE
Payer: MEDICARE

## 2025-06-23 DIAGNOSIS — R55 SYNCOPE AND COLLAPSE: ICD-10-CM

## 2025-06-23 DIAGNOSIS — R07.9 CHEST PAIN: ICD-10-CM

## 2025-06-23 DIAGNOSIS — R55 SYNCOPE: ICD-10-CM

## 2025-06-23 LAB
ALBUMIN SERPL-MCNC: 3.1 G/DL (ref 3.4–4.8)
ALBUMIN/GLOB SERPL: 0.8 RATIO (ref 1.1–2)
ALP SERPL-CCNC: 88 UNIT/L (ref 40–150)
ALT SERPL-CCNC: 15 UNIT/L (ref 0–55)
ANION GAP SERPL CALC-SCNC: 6 MEQ/L
AST SERPL-CCNC: 27 UNIT/L (ref 11–45)
BACTERIA #/AREA URNS AUTO: ABNORMAL /HPF
BASOPHILS # BLD AUTO: 0.03 X10(3)/MCL
BASOPHILS NFR BLD AUTO: 0.7 %
BILIRUB SERPL-MCNC: 0.3 MG/DL
BILIRUB UR QL STRIP.AUTO: NEGATIVE
BUN SERPL-MCNC: 16.8 MG/DL (ref 9.8–20.1)
CALCIUM SERPL-MCNC: 8.4 MG/DL (ref 8.4–10.2)
CHLORIDE SERPL-SCNC: 108 MMOL/L (ref 98–107)
CLARITY UR: ABNORMAL
CO2 SERPL-SCNC: 24 MMOL/L (ref 23–31)
COLOR UR AUTO: YELLOW
CREAT SERPL-MCNC: 0.88 MG/DL (ref 0.55–1.02)
CREAT/UREA NIT SERPL: 19
EOSINOPHIL # BLD AUTO: 0.12 X10(3)/MCL (ref 0–0.9)
EOSINOPHIL NFR BLD AUTO: 3 %
EPI CELLS #/AREA URNS HPF: ABNORMAL /HPF
ERYTHROCYTE [DISTWIDTH] IN BLOOD BY AUTOMATED COUNT: 14 % (ref 11.5–17)
GFR SERPLBLD CREATININE-BSD FMLA CKD-EPI: >60 ML/MIN/1.73/M2
GLOBULIN SER-MCNC: 4 GM/DL (ref 2.4–3.5)
GLUCOSE SERPL-MCNC: 93 MG/DL (ref 82–115)
GLUCOSE UR QL STRIP: NORMAL
GRAN CASTS #/AREA URNS LPF: ABNORMAL /LPF
HCT VFR BLD AUTO: 33.9 % (ref 37–47)
HGB BLD-MCNC: 10.9 G/DL (ref 12–16)
HGB UR QL STRIP: NEGATIVE
HYALINE CASTS #/AREA URNS LPF: >20 /LPF
IMM GRANULOCYTES # BLD AUTO: 0.01 X10(3)/MCL (ref 0–0.04)
IMM GRANULOCYTES NFR BLD AUTO: 0.2 %
KETONES UR QL STRIP: NEGATIVE
LEUKOCYTE ESTERASE UR QL STRIP: 250
LYMPHOCYTES # BLD AUTO: 2.03 X10(3)/MCL (ref 0.6–4.6)
LYMPHOCYTES NFR BLD AUTO: 50.6 %
MCH RBC QN AUTO: 30.5 PG (ref 27–31)
MCHC RBC AUTO-ENTMCNC: 32.2 G/DL (ref 33–36)
MCV RBC AUTO: 95 FL (ref 80–94)
MONOCYTES # BLD AUTO: 0.37 X10(3)/MCL (ref 0.1–1.3)
MONOCYTES NFR BLD AUTO: 9.2 %
MUCOUS THREADS URNS QL MICRO: ABNORMAL /LPF
NEUTROPHILS # BLD AUTO: 1.45 X10(3)/MCL (ref 2.1–9.2)
NEUTROPHILS NFR BLD AUTO: 36.3 %
NITRITE UR QL STRIP: NEGATIVE
NRBC BLD AUTO-RTO: 0 %
OHS QRS DURATION: 86 MS
OHS QTC CALCULATION: 454 MS
PH UR STRIP: 7 [PH]
PLATELET # BLD AUTO: 228 X10(3)/MCL (ref 130–400)
PMV BLD AUTO: 10.2 FL (ref 7.4–10.4)
POTASSIUM SERPL-SCNC: 4.7 MMOL/L (ref 3.5–5.1)
PROT SERPL-MCNC: 7.1 GM/DL (ref 5.8–7.6)
PROT UR QL STRIP: ABNORMAL
RBC # BLD AUTO: 3.57 X10(6)/MCL (ref 4.2–5.4)
RBC #/AREA URNS AUTO: ABNORMAL /HPF
SODIUM SERPL-SCNC: 138 MMOL/L (ref 136–145)
SP GR UR STRIP.AUTO: 1.01 (ref 1–1.03)
SQUAMOUS #/AREA URNS LPF: ABNORMAL /HPF
TROPONIN I SERPL-MCNC: <0.01 NG/ML (ref 0–0.04)
UROBILINOGEN UR STRIP-ACNC: 2
WBC # BLD AUTO: 4.01 X10(3)/MCL (ref 4.5–11.5)
WBC #/AREA URNS AUTO: ABNORMAL /HPF

## 2025-06-23 PROCEDURE — 63600175 PHARM REV CODE 636 W HCPCS: Performed by: INTERNAL MEDICINE

## 2025-06-23 PROCEDURE — 81015 MICROSCOPIC EXAM OF URINE: CPT | Performed by: EMERGENCY MEDICINE

## 2025-06-23 PROCEDURE — 96372 THER/PROPH/DIAG INJ SC/IM: CPT | Performed by: INTERNAL MEDICINE

## 2025-06-23 PROCEDURE — 93010 ELECTROCARDIOGRAM REPORT: CPT | Mod: ,,, | Performed by: INTERNAL MEDICINE

## 2025-06-23 PROCEDURE — G0378 HOSPITAL OBSERVATION PER HR: HCPCS

## 2025-06-23 PROCEDURE — 85025 COMPLETE CBC W/AUTO DIFF WBC: CPT | Performed by: EMERGENCY MEDICINE

## 2025-06-23 PROCEDURE — 87086 URINE CULTURE/COLONY COUNT: CPT | Performed by: EMERGENCY MEDICINE

## 2025-06-23 PROCEDURE — 80053 COMPREHEN METABOLIC PANEL: CPT | Performed by: EMERGENCY MEDICINE

## 2025-06-23 PROCEDURE — 93005 ELECTROCARDIOGRAM TRACING: CPT

## 2025-06-23 PROCEDURE — 25000003 PHARM REV CODE 250: Performed by: INTERNAL MEDICINE

## 2025-06-23 PROCEDURE — 99285 EMERGENCY DEPT VISIT HI MDM: CPT | Mod: 25

## 2025-06-23 PROCEDURE — 84484 ASSAY OF TROPONIN QUANT: CPT | Performed by: EMERGENCY MEDICINE

## 2025-06-23 PROCEDURE — 96375 TX/PRO/DX INJ NEW DRUG ADDON: CPT

## 2025-06-23 PROCEDURE — 96374 THER/PROPH/DIAG INJ IV PUSH: CPT

## 2025-06-23 RX ORDER — DIVALPROEX SODIUM 250 MG/1
250 TABLET, FILM COATED, EXTENDED RELEASE ORAL DAILY
Status: DISCONTINUED | OUTPATIENT
Start: 2025-06-24 | End: 2025-06-26 | Stop reason: HOSPADM

## 2025-06-23 RX ORDER — HYDRALAZINE HYDROCHLORIDE 20 MG/ML
10 INJECTION INTRAMUSCULAR; INTRAVENOUS
Status: DISCONTINUED | OUTPATIENT
Start: 2025-06-23 | End: 2025-06-26 | Stop reason: HOSPADM

## 2025-06-23 RX ORDER — OXYBUTYNIN CHLORIDE 5 MG/1
5 TABLET ORAL DAILY
Status: DISCONTINUED | OUTPATIENT
Start: 2025-06-24 | End: 2025-06-26 | Stop reason: HOSPADM

## 2025-06-23 RX ORDER — AMLODIPINE BESYLATE 2.5 MG/1
2.5 TABLET ORAL DAILY
Status: DISCONTINUED | OUTPATIENT
Start: 2025-06-24 | End: 2025-06-26 | Stop reason: HOSPADM

## 2025-06-23 RX ORDER — IBUPROFEN 200 MG
24 TABLET ORAL
Status: DISCONTINUED | OUTPATIENT
Start: 2025-06-23 | End: 2025-06-26 | Stop reason: HOSPADM

## 2025-06-23 RX ORDER — OXCARBAZEPINE 150 MG/1
150 TABLET, FILM COATED ORAL DAILY
Status: DISCONTINUED | OUTPATIENT
Start: 2025-06-24 | End: 2025-06-26 | Stop reason: HOSPADM

## 2025-06-23 RX ORDER — ACETAMINOPHEN 325 MG/1
650 TABLET ORAL EVERY 4 HOURS PRN
Status: DISCONTINUED | OUTPATIENT
Start: 2025-06-23 | End: 2025-06-26 | Stop reason: HOSPADM

## 2025-06-23 RX ORDER — CEFTRIAXONE 1 G/1
1 INJECTION, POWDER, FOR SOLUTION INTRAMUSCULAR; INTRAVENOUS
Status: COMPLETED | OUTPATIENT
Start: 2025-06-23 | End: 2025-06-25

## 2025-06-23 RX ORDER — ONDANSETRON HYDROCHLORIDE 2 MG/ML
4 INJECTION, SOLUTION INTRAVENOUS EVERY 8 HOURS PRN
Status: DISCONTINUED | OUTPATIENT
Start: 2025-06-23 | End: 2025-06-26 | Stop reason: HOSPADM

## 2025-06-23 RX ORDER — POLYETHYLENE GLYCOL 3350 17 G/17G
17 POWDER, FOR SOLUTION ORAL DAILY
Status: DISCONTINUED | OUTPATIENT
Start: 2025-06-23 | End: 2025-06-26 | Stop reason: HOSPADM

## 2025-06-23 RX ORDER — ATORVASTATIN CALCIUM 40 MG/1
40 TABLET, FILM COATED ORAL DAILY
Status: DISCONTINUED | OUTPATIENT
Start: 2025-06-24 | End: 2025-06-26 | Stop reason: HOSPADM

## 2025-06-23 RX ORDER — FLUOXETINE 10 MG/1
10 CAPSULE ORAL DAILY
Status: DISCONTINUED | OUTPATIENT
Start: 2025-06-24 | End: 2025-06-26 | Stop reason: HOSPADM

## 2025-06-23 RX ORDER — HYDRALAZINE HYDROCHLORIDE 20 MG/ML
INJECTION INTRAMUSCULAR; INTRAVENOUS
Status: DISPENSED
Start: 2025-06-23 | End: 2025-06-24

## 2025-06-23 RX ORDER — SODIUM CHLORIDE 9 MG/ML
INJECTION, SOLUTION INTRAVENOUS CONTINUOUS
Status: DISCONTINUED | OUTPATIENT
Start: 2025-06-23 | End: 2025-06-26

## 2025-06-23 RX ORDER — LATANOPROST 50 UG/ML
1 SOLUTION/ DROPS OPHTHALMIC NIGHTLY
Status: DISCONTINUED | OUTPATIENT
Start: 2025-06-23 | End: 2025-06-26 | Stop reason: HOSPADM

## 2025-06-23 RX ORDER — IBUPROFEN 200 MG
16 TABLET ORAL
Status: DISCONTINUED | OUTPATIENT
Start: 2025-06-23 | End: 2025-06-26 | Stop reason: HOSPADM

## 2025-06-23 RX ORDER — NALOXONE HCL 0.4 MG/ML
0.02 VIAL (ML) INJECTION
Status: DISCONTINUED | OUTPATIENT
Start: 2025-06-23 | End: 2025-06-26 | Stop reason: HOSPADM

## 2025-06-23 RX ORDER — GLUCAGON 1 MG
1 KIT INJECTION
Status: DISCONTINUED | OUTPATIENT
Start: 2025-06-23 | End: 2025-06-26 | Stop reason: HOSPADM

## 2025-06-23 RX ORDER — HYDRALAZINE HYDROCHLORIDE 10 MG/1
10 TABLET, FILM COATED ORAL 3 TIMES DAILY
Status: ON HOLD | COMMUNITY
Start: 2025-06-19 | End: 2025-06-26 | Stop reason: HOSPADM

## 2025-06-23 RX ORDER — ENOXAPARIN SODIUM 100 MG/ML
40 INJECTION SUBCUTANEOUS EVERY 24 HOURS
Status: DISCONTINUED | OUTPATIENT
Start: 2025-06-23 | End: 2025-06-26 | Stop reason: HOSPADM

## 2025-06-23 RX ORDER — TALC
6 POWDER (GRAM) TOPICAL NIGHTLY PRN
Status: DISCONTINUED | OUTPATIENT
Start: 2025-06-23 | End: 2025-06-26 | Stop reason: HOSPADM

## 2025-06-23 RX ORDER — RIVASTIGMINE TARTRATE 1.5 MG/1
3 CAPSULE ORAL 2 TIMES DAILY
Status: DISCONTINUED | OUTPATIENT
Start: 2025-06-23 | End: 2025-06-26 | Stop reason: HOSPADM

## 2025-06-23 RX ADMIN — ENOXAPARIN SODIUM 40 MG: 40 INJECTION SUBCUTANEOUS at 04:06

## 2025-06-23 RX ADMIN — SODIUM CHLORIDE: 9 INJECTION, SOLUTION INTRAVENOUS at 02:06

## 2025-06-23 RX ADMIN — HYDRALAZINE HYDROCHLORIDE 10 MG: 20 INJECTION INTRAMUSCULAR; INTRAVENOUS at 08:06

## 2025-06-23 RX ADMIN — CEFTRIAXONE SODIUM 1 G: 1 INJECTION, POWDER, FOR SOLUTION INTRAMUSCULAR; INTRAVENOUS at 02:06

## 2025-06-23 NOTE — ED PROVIDER NOTES
Encounter Date: 6/23/2025       History     Chief Complaint   Patient presents with    Altered Mental Status     From [facility] with complaints of going unresponsive at breakfast table. EMS arrived pt was GCS 14. In route, pt went unresponsive again. GCS 3. Pt GCS 12 on arrival. +BM in route.       HPI  Review of patient's allergies indicates:  No Known Allergies  Past Medical History:   Diagnosis Date    Cellulitis     Hypertension      Past Surgical History:   Procedure Laterality Date    HYSTERECTOMY       Family History   Problem Relation Name Age of Onset    Obesity Mother      Heart disease Father      Obesity Father      Dementia Maternal Aunt      Aneurysm Neg Hx      Cancer Neg Hx      Clotting disorder Neg Hx      Diabetes Neg Hx      Fainting Neg Hx      Hyperlipidemia Neg Hx      Kidney disease Neg Hx      Liver disease Neg Hx      Migraines Neg Hx      Neuropathy Neg Hx      Parkinsonism Neg Hx      Seizures Neg Hx      Stroke Neg Hx      Tremor Neg Hx       Social History[1]  Review of Systems    Physical Exam     Initial Vitals [06/23/25 1000]   BP Pulse Resp Temp SpO2   118/72 68 14 96.8 °F (36 °C) 100 %      MAP       --         Physical Exam    ED Course   Procedures  Labs Reviewed   CBC W/ AUTO DIFFERENTIAL    Narrative:     The following orders were created for panel order CBC auto differential.  Procedure                               Abnormality         Status                     ---------                               -----------         ------                     CBC with Differential[[id number]]                                                        Please view results for these tests on the individual orders.   COMPREHENSIVE METABOLIC PANEL   TROPONIN I   URINALYSIS, REFLEX TO URINE CULTURE   CBC WITH DIFFERENTIAL          Imaging Results    None          Medications - No data to display  Medical Decision Making  Amount and/or Complexity of Data Reviewed  Labs: ordered.  Radiology:  ordered.                                      Clinical Impression:  Final diagnoses:  [R55] Syncope                       [1]   Social History  Tobacco Use    Smoking status: Former     Passive exposure: Never    Smokeless tobacco: Never   Substance Use Topics    Alcohol use: Not Currently     Comment: occ    Drug use: Not Currently

## 2025-06-23 NOTE — H&P
[facility] Hospital Medicine History & Physical Examination       Patient Name: Kathleen Gandhi  MRN: [id number]  Patient Class: OP- Observation   Admission Date: 6/23/2025   Admitting Physician: [facility]   Length of Stay: 0  Attending Physician: Andriy Deal MD  Primary Care Provider: Ena, Primary Doctor  Face-to-Face encounter date: 06/23/2025  Code Status:  Chief Complaint: Altered Mental Status (From [facility] with complaints of going unresponsive at breakfast table. EMS arrived pt was GCS 14. In route, pt went unresponsive again. GCS 3. Pt GCS 12 on arrival. +BM in route.  )                  HISTORY OF PRESENT ILLNESS:       [age] old nursing home resident  woman with a history of Alzheimer's dementia, HTN, chronic heart failure, T2 dm, wheelchair dependent at baseline sent from nursing home due to episodes of unresponsiveness not associated with seizure activity, shortness for breath.  At presentation she was afebrile, hemodynamically stable.  CT head showed no acute changes.  Labs showed chronic anemia, stable platelet count, no major electrolyte abnormalities.  Troponin was within normal limits.  UA was suspicious for UTI and samples were sent for cultures.  Patient admitted to hospital medicine service.  When seen at bedside she was alert, resting in the bed.  She was disoriented, baseline mental status.  Daughter was at bedside contributed to HPI.  At baseline patient needs assistance with feeding, ADLs and IADLs.    PAST MEDICAL HISTORY:     Past Medical History:   Diagnosis Date    Cellulitis     Hypertension        PAST SURGICAL HISTORY:     Past Surgical History:   Procedure Laterality Date    HYSTERECTOMY         ALLERGIES:   Patient has no known allergies.    FAMILY HISTORY:   Reviewed and negative    SOCIAL HISTORY:     Social History     Tobacco Use    Smoking status: Former     Passive exposure: Never    Smokeless tobacco: Never    Substance Use Topics    Alcohol use: Not Currently     Comment: [facility]        HOME MEDICATIONS:     Prior to Admission medications    Medication Sig Start Date End Date Taking? Authorizing Provider   acetaminophen (TYLENOL) 325 MG tablet Take 650 mg by mouth every 8 (eight) hours as needed for Pain.    Provider, Historical   amLODIPine (NORVASC) 10 MG tablet Take 10 mg by mouth once daily.    Provider, Historical   atorvastatin (LIPITOR) 40 MG tablet Take 1 tablet (40 mg total) by mouth once daily. 10/6/22 10/6/23  Liz Coronado MD   clonazePAM (KLONOPIN) 0.5 MG tablet Take 0.5 mg by mouth every 6 (six) hours as needed for Anxiety.    Provider, Historical   diaper,brief,adult,disposable Misc 1 each by Misc.(Non-Drug; Combo Route) route 2 (two) times a day. 7/28/22   Alina Adam MD   diclofenac sodium (VOLTAREN) 1 % Gel Apply 2 g topically every 6 (six) hours as needed. 7/28/22   Alina Adam MD   divalproex ER (DEPAKOTE ER) 250 MG 24 hr tablet Take 250 mg by mouth once daily. 1/2/25   Provider, Historical   ferrous sulfate (FEOSOL) 325 mg (65 mg iron) Tab tablet Take 325 mg by mouth 3 (three) times daily.    Provider, Historical   FLUoxetine 10 MG capsule Take 10 mg by mouth once daily. 1/7/25   Provider, Historical   hydrALAZINE (APRESOLINE) 100 MG tablet Take 1 tablet (100 mg total) by mouth every 8 (eight) hours. 6/6/23 6/5/24  German Boateng MD   hydroCHLOROthiazide (HYDRODIURIL) 25 MG tablet Take 25 mg by mouth once daily.    Provider, Historical   latanoprost 0.005 % ophthalmic solution Place 1 drop into both eyes every evening. Place 1 drop into both eyes every evening. 5/4/22   Friend, MD Sergio   LORazepam (ATIVAN) 0.5 MG tablet Take 0.5 mg by mouth 2 (two) times daily.    Provider, Historical   losartan (COZAAR) 100 MG tablet Take 100 mg by mouth once daily.    Provider, Historical   OXcarbazepine (TRILEPTAL) 150 MG Tab Take 150 mg by mouth once daily.    Provider, Historical   oxybutynin  "(DITROPAN) 5 MG Tab Take 1 tablet (5 mg total) by mouth once daily. 3/27/23   Liz Coronado MD   QUEtiapine (SEROQUEL) 200 MG Tab Take 200 mg by mouth every evening. 1/2/25   Provider, Historical   QUEtiapine (SEROQUEL) 25 MG Tab Take 25 mg by mouth every evening. 1/3/25   Provider, Historical   rivastigmine tartrate (EXELON) 3 MG capsule Take 1 capsule (3 mg total) by mouth 2 (two) times daily. 10/6/22 7/16/23  Liz Coronado MD   underpads 30 X 36 " Pads 1 each by Misc.(Non-Drug; Combo Route) route every evening. 7/28/22   Alina Adam MD       REVIEW OF SYSTEMS:   Except as documented, all other systems reviewed and negative     PHYSICAL EXAM:     VITAL SIGNS: 24 HRS MIN & MAX LAST   Temp  Min: 96.8 °F (36 °C)  Max: 96.8 °F (36 °C) 96.8 °F (36 °C)   BP  Min: 109/77  Max: 118/72 109/77   Pulse  Min: 68  Max: 81  81   Resp  Min: 14  Max: 16 16   SpO2  Min: 96 %  Max: 100 % 100 %     General appearance: Well-developed, well-nourished female in no apparent distress.  HENT: Atraumatic head. Moist mucous membranes of oral cavity.  Eyes: Normal extraocular movements.   Neck: Supple.   Lungs: Clear to auscultation bilaterally. No wheezing present.   Heart: Regular rate and rhythm. S1 and S2 present with no murmurs/gallop/rub. No pedal edema. No JVD present.   Abdomen: Soft, non-distended, non-tender. No rebound tenderness/guarding. Bowel sounds are normal.   Extremities: No cyanosis, clubbing, or edema.  Skin: No Rash.   Neuro:  Alert, disoriented, limited exam, does not follow commands   Psych/mental status: Appropriate mood and affect. Responds appropriately to questions.     LABS AND IMAGING:     Recent Labs   Lab 06/23/25  1013   WBC 4.01*   RBC 3.57*   HGB 10.9*   HCT 33.9*   MCV 95.0*   MCH 30.5   MCHC 32.2*   RDW 14.0      MPV 10.2       Recent Labs   Lab 06/23/25  1013      K 4.7   *   CO2 24   BUN 16.8   CREATININE 0.88   GLU 93   CALCIUM 8.4   ALBUMIN 3.1*   PROT 7.1   ALKPHOS " 88   ALT 15   AST 27   BILITOT 0.3       Microbiology Results (last 7 days)       Procedure Component Value Units Date/Time    Urine culture [[id number]] Collected: 06/23/25 1049    Order Status: Sent Specimen: Urine Updated: 06/23/25 1136             CT Head Without Contrast  Narrative: EXAMINATION:  CT HEAD WITHOUT CONTRAST    CLINICAL HISTORY:  Mental status change, unknown cause;    TECHNIQUE:  Axial scans were obtained from skull base to the vertex.    Coronal and sagittal reconstructions obtained from the axial data.    Automatic exposure control was utilized to limit radiation dose.    Contrast: None    Radiation Dose:    Total DLP: 953 mGy*cm    COMPARISON:  CT head dated 01/13/2025    FINDINGS:  There is no acute intracranial hemorrhage or edema. The gray-white matter differentiation is preserved.  Patchy hypodensities in the subcortical and periventricular white matter likely represent chronic microvascular ischemic changes.    There is no mass effect or midline shift.  There is diffuse parenchymal volume loss.  The basal cisterns are patent. There is no abnormal extra-axial fluid collection.    The calvarium and skull base are intact.  There is trace scattered paranasal sinus mucosal thickening.  Impression: 1. No acute intracranial abnormality.  2. Chronic microvascular ischemic changes.    Electronically signed by: Regine Leiva  Date:    06/23/2025  Time:    10:41      ASSESSMENT & PLAN:     Syncope   Possible polypharmacy  Possible UTI-POA    HX: As mentioned above    Plan:   -obtain TTE, carotid Doppler.  Continue telemetry  -add ceftriaxone.  Follow urine cultures   -gentle IV hydration.  Encourage p.o. intake  -will review home medications after reconciliation, hold sedating medications.  Continue essential medications.  - will try obtain records from her recent hospital stay in [facility]      __________________________________________________________________________  INPATIENT LIST  OF MEDICATIONS   Current Medications[1]      Scheduled Meds:   [START ON 6/24/2025] amLODIPine  2.5 mg Oral Daily    [START ON 6/24/2025] atorvastatin  40 mg Oral Daily    cefTRIAXone (Rocephin) IV (PEDS and ADULTS)  1 g Intravenous Q24H    [START ON 6/24/2025] divalproex ER  250 mg Oral Daily    enoxparin  40 mg Subcutaneous Q24H (prophylaxis, 1700)    [START ON 6/24/2025] FLUoxetine  10 mg Oral Daily    latanoprost  1 drop Both Eyes QHS    [START ON 6/24/2025] OXcarbazepine  150 mg Oral Daily    [START ON 6/24/2025] oxybutynin  5 mg Oral Daily    rivastigmine tartrate  3 mg Oral BID     Continuous Infusions:   0.9% NaCl   Intravenous Continuous         PRN Meds:.      Andriy Deal MD   06/23/2025     Screening for Social Drivers for health:  Patient screened for food insecurity, housing instability, transportation needs, utility difficulties, and interpersonal safety (select all that apply as identified as concern)  []Housing or Food  []Transportation Needs  []Utility Difficulties  []Interpersonal safety  [x]None          [1]   Current Facility-Administered Medications:     0.9% NaCl infusion, , Intravenous, Continuous, Andriy Deal MD    [START ON 6/24/2025] amLODIPine tablet 2.5 mg, 2.5 mg, Oral, Daily, Andriy Deal MD    [START ON 6/24/2025] atorvastatin tablet 40 mg, 40 mg, Oral, Daily, Andriy Deal MD    cefTRIAXone injection 1 g, 1 g, Intravenous, Q24H, Andriy Deal MD, 1 g at 06/23/25 1406    [START ON 6/24/2025] divalproex ER 24 hr tablet 250 mg, 250 mg, Oral, Daily, Andriy Deal MD    enoxaparin injection 40 mg, 40 mg, Subcutaneous, Q24H (prophylaxis, 1700), Andriy Deal MD    [START ON 6/24/2025] FLUoxetine capsule 10 mg, 10 mg, Oral, Daily, Andriy Deal MD    latanoprost 0.005 % ophthalmic solution 1 drop, 1 drop, Both Eyes, QHS, Andriy Deal MD    [START ON 6/24/2025] OXcarbazepine tablet 150 mg, 150 mg, Oral, Daily, Andriy Deal MD     [START ON 6/24/2025] oxybutynin tablet 5 mg, 5 mg, Oral, Daily, Andriy Deal MD    rivastigmine tartrate capsule 3 mg, 3 mg, Oral, BID, Andriy Deal MD    Current Outpatient Medications:     amLODIPine (NORVASC) 10 MG tablet, Take 10 mg by mouth once daily., Disp: , Rfl:     diaper,brief,adult,disposable Misc, 1 each by Misc.(Non-Drug; Combo Route) route 2 (two) times a day., Disp: 20 each, Rfl: 1    divalproex ER (DEPAKOTE ER) 250 MG 24 hr tablet, Take 250 mg by mouth once daily., Disp: , Rfl:     hydrALAZINE (APRESOLINE) 10 MG tablet, Take 10 mg by mouth 3 (three) times daily., Disp: , Rfl:     LORazepam (ATIVAN) 0.5 MG tablet, Take 0.5 mg by mouth 2 (two) times daily., Disp: , Rfl:     losartan (COZAAR) 100 MG tablet, Take 100 mg by mouth once daily., Disp: , Rfl:     OXcarbazepine (TRILEPTAL) 150 MG Tab, Take 150 mg by mouth once daily., Disp: , Rfl:     oxybutynin (DITROPAN) 5 MG Tab, Take 1 tablet (5 mg total) by mouth once daily., Disp: 30 tablet, Rfl: 2    QUEtiapine (SEROQUEL) 200 MG Tab, Take 200 mg by mouth every evening., Disp: , Rfl:     QUEtiapine (SEROQUEL) 25 MG Tab, Take 25 mg by mouth every evening., Disp: , Rfl:     acetaminophen (TYLENOL) 325 MG tablet, Take 650 mg by mouth every 8 (eight) hours as needed for Pain., Disp: , Rfl:     atorvastatin (LIPITOR) 40 MG tablet, Take 1 tablet (40 mg total) by mouth once daily., Disp: 90 tablet, Rfl: 3    clonazePAM (KLONOPIN) 0.5 MG tablet, Take 0.5 mg by mouth every 6 (six) hours as needed for Anxiety., Disp: , Rfl:     diclofenac sodium (VOLTAREN) 1 % Gel, Apply 2 g topically every 6 (six) hours as needed., Disp: 50 g, Rfl: 0    ferrous sulfate (FEOSOL) 325 mg (65 mg iron) Tab tablet, Take 325 mg by mouth 3 (three) times daily., Disp: , Rfl:     FLUoxetine 10 MG capsule, Take 10 mg by mouth once daily., Disp: , Rfl:     hydroCHLOROthiazide (HYDRODIURIL) 25 MG tablet, Take 25 mg by mouth once daily., Disp: , Rfl:     latanoprost 0.005 %  "ophthalmic solution, Place 1 drop into both eyes every evening. Place 1 drop into both eyes every evening., Disp: 2.5 mL, Rfl: 12    rivastigmine tartrate (EXELON) 3 MG capsule, Take 1 capsule (3 mg total) by mouth 2 (two) times daily., Disp: 180 capsule, Rfl: 1    underpads 30 X 36 " Pads, 1 each by Misc.(Non-Drug; Combo Route) route every evening., Disp: 30 each, Rfl: 2    "

## 2025-06-24 LAB
ALBUMIN SERPL-MCNC: 3 G/DL (ref 3.4–4.8)
ALBUMIN/GLOB SERPL: 0.9 RATIO (ref 1.1–2)
ALP SERPL-CCNC: 88 UNIT/L (ref 40–150)
ALT SERPL-CCNC: 12 UNIT/L (ref 0–55)
ANION GAP SERPL CALC-SCNC: 7 MEQ/L
AST SERPL-CCNC: 16 UNIT/L (ref 11–45)
BASOPHILS # BLD AUTO: 0.06 X10(3)/MCL
BASOPHILS NFR BLD AUTO: 1.2 %
BILIRUB SERPL-MCNC: 0.3 MG/DL
BUN SERPL-MCNC: 14.3 MG/DL (ref 9.8–20.1)
CALCIUM SERPL-MCNC: 8.6 MG/DL (ref 8.4–10.2)
CHLORIDE SERPL-SCNC: 113 MMOL/L (ref 98–107)
CO2 SERPL-SCNC: 22 MMOL/L (ref 23–31)
CREAT SERPL-MCNC: 0.7 MG/DL (ref 0.55–1.02)
CREAT/UREA NIT SERPL: 20
EOSINOPHIL # BLD AUTO: 0.14 X10(3)/MCL (ref 0–0.9)
EOSINOPHIL NFR BLD AUTO: 2.7 %
ERYTHROCYTE [DISTWIDTH] IN BLOOD BY AUTOMATED COUNT: 14.2 % (ref 11.5–17)
GFR SERPLBLD CREATININE-BSD FMLA CKD-EPI: >60 ML/MIN/1.73/M2
GLOBULIN SER-MCNC: 3.5 GM/DL (ref 2.4–3.5)
GLUCOSE SERPL-MCNC: 85 MG/DL (ref 82–115)
HCT VFR BLD AUTO: 32.7 % (ref 37–47)
HGB BLD-MCNC: 10.7 G/DL (ref 12–16)
IMM GRANULOCYTES # BLD AUTO: 0.01 X10(3)/MCL (ref 0–0.04)
IMM GRANULOCYTES NFR BLD AUTO: 0.2 %
LEFT CCA DIST SYS: 54.9 CM/S
LEFT CCA PROX SYS: 71.9 CM/S
LEFT ECA SYS: 51 CM/S
LEFT ICA MID DIAS: 10.2 CM/S
LEFT ICA MID SYS: 90 CM/S
LEFT ICA PROX DIAS: 0 CM/S
LEFT ICA PROX SYS: 33 CM/S
LEFT VERTEBRAL SYS: 47.4 CM/S
LYMPHOCYTES # BLD AUTO: 2.54 X10(3)/MCL (ref 0.6–4.6)
LYMPHOCYTES NFR BLD AUTO: 49.7 %
MAGNESIUM SERPL-MCNC: 1.9 MG/DL (ref 1.6–2.6)
MCH RBC QN AUTO: 30.7 PG (ref 27–31)
MCHC RBC AUTO-ENTMCNC: 32.7 G/DL (ref 33–36)
MCV RBC AUTO: 94 FL (ref 80–94)
MONOCYTES # BLD AUTO: 0.52 X10(3)/MCL (ref 0.1–1.3)
MONOCYTES NFR BLD AUTO: 10.2 %
NEUTROPHILS # BLD AUTO: 1.84 X10(3)/MCL (ref 2.1–9.2)
NEUTROPHILS NFR BLD AUTO: 36 %
NRBC BLD AUTO-RTO: 0 %
OHS CV CAROTID RIGHT ICA EDV HIGHEST: 7.8
OHS CV CAROTID ULTRASOUND LEFT ICA/CCA RATIO: 1.64
OHS CV CAROTID ULTRASOUND RIGHT ICA/CCA RATIO: 0.83
OHS CV PV CAROTID LEFT HIGHEST CCA: 72
OHS CV PV CAROTID LEFT HIGHEST ICA: 90
OHS CV PV CAROTID RIGHT HIGHEST CCA: 50
OHS CV PV CAROTID RIGHT HIGHEST ICA: 41.4
OHS CV US CAROTID LEFT HIGHEST EDV: 10.2
PLATELET # BLD AUTO: 238 X10(3)/MCL (ref 130–400)
PMV BLD AUTO: 10 FL (ref 7.4–10.4)
POTASSIUM SERPL-SCNC: 4 MMOL/L (ref 3.5–5.1)
PROT SERPL-MCNC: 6.5 GM/DL (ref 5.8–7.6)
RBC # BLD AUTO: 3.48 X10(6)/MCL (ref 4.2–5.4)
RIGHT CCA DIST SYS: 49.8 CM/S
RIGHT CCA PROX SYS: 36 CM/S
RIGHT ECA SYS: 90 CM/S
RIGHT ICA PROX DIAS: 7.8 CM/S
RIGHT ICA PROX SYS: 41.4 CM/S
RIGHT VERTEBRAL SYS: 43.8 CM/S
SODIUM SERPL-SCNC: 142 MMOL/L (ref 136–145)
WBC # BLD AUTO: 5.11 X10(3)/MCL (ref 4.5–11.5)

## 2025-06-24 PROCEDURE — 63600175 PHARM REV CODE 636 W HCPCS: Performed by: INTERNAL MEDICINE

## 2025-06-24 PROCEDURE — 80053 COMPREHEN METABOLIC PANEL: CPT | Performed by: INTERNAL MEDICINE

## 2025-06-24 PROCEDURE — 51798 US URINE CAPACITY MEASURE: CPT

## 2025-06-24 PROCEDURE — 83735 ASSAY OF MAGNESIUM: CPT | Performed by: INTERNAL MEDICINE

## 2025-06-24 PROCEDURE — G0378 HOSPITAL OBSERVATION PER HR: HCPCS

## 2025-06-24 PROCEDURE — 25000003 PHARM REV CODE 250: Performed by: INTERNAL MEDICINE

## 2025-06-24 PROCEDURE — 36415 COLL VENOUS BLD VENIPUNCTURE: CPT | Performed by: INTERNAL MEDICINE

## 2025-06-24 PROCEDURE — 85025 COMPLETE CBC W/AUTO DIFF WBC: CPT | Performed by: INTERNAL MEDICINE

## 2025-06-24 PROCEDURE — 92610 EVALUATE SWALLOWING FUNCTION: CPT

## 2025-06-24 PROCEDURE — 96372 THER/PROPH/DIAG INJ SC/IM: CPT | Performed by: INTERNAL MEDICINE

## 2025-06-24 RX ORDER — QUETIAPINE FUMARATE 25 MG/1
50 TABLET, FILM COATED ORAL ONCE
Status: COMPLETED | OUTPATIENT
Start: 2025-06-24 | End: 2025-06-24

## 2025-06-24 RX ORDER — HALOPERIDOL LACTATE 5 MG/ML
5 INJECTION, SOLUTION INTRAMUSCULAR ONCE
Status: COMPLETED | OUTPATIENT
Start: 2025-06-24 | End: 2025-06-24

## 2025-06-24 RX ORDER — HALOPERIDOL LACTATE 5 MG/ML
5 INJECTION, SOLUTION INTRAMUSCULAR DAILY PRN
Status: DISCONTINUED | OUTPATIENT
Start: 2025-06-24 | End: 2025-06-25

## 2025-06-24 RX ORDER — QUETIAPINE FUMARATE 25 MG/1
50 TABLET, FILM COATED ORAL NIGHTLY PRN
Status: DISCONTINUED | OUTPATIENT
Start: 2025-06-24 | End: 2025-06-25

## 2025-06-24 RX ADMIN — QUETIAPINE FUMARATE 50 MG: 25 TABLET ORAL at 09:06

## 2025-06-24 RX ADMIN — QUETIAPINE FUMARATE 50 MG: 25 TABLET ORAL at 05:06

## 2025-06-24 RX ADMIN — CEFTRIAXONE SODIUM 1 G: 1 INJECTION, POWDER, FOR SOLUTION INTRAMUSCULAR; INTRAVENOUS at 02:06

## 2025-06-24 RX ADMIN — Medication 6 MG: at 09:06

## 2025-06-24 RX ADMIN — ENOXAPARIN SODIUM 40 MG: 40 INJECTION SUBCUTANEOUS at 04:06

## 2025-06-24 RX ADMIN — HALOPERIDOL LACTATE 5 MG: 5 INJECTION, SOLUTION INTRAMUSCULAR at 10:06

## 2025-06-24 RX ADMIN — RIVASTIGMINE TARTRATE 3 MG: 1.5 CAPSULE ORAL at 09:06

## 2025-06-24 NOTE — PLAN OF CARE
Problem: Adult Inpatient Plan of Care  Goal: Plan of Care Review  Outcome: Progressing  Goal: Patient-Specific Goal (Individualized)  Outcome: Progressing  Goal: Absence of Hospital-Acquired Illness or Injury  Outcome: Progressing  Intervention: Identify and Manage Fall Risk  Flowsheets (Taken 6/24/2025 0810)  Safety Promotion/Fall Prevention:   assistive device/personal item within reach   side rails raised x 2   bed alarm set  Intervention: Prevent Skin Injury  Flowsheets (Taken 6/24/2025 0810)  Body Position: weight shifting  Device Skin Pressure Protection:   absorbent pad utilized/changed   tubing/devices free from skin contact  Intervention: Prevent and Manage VTE (Venous Thromboembolism) Risk  Flowsheets (Taken 6/24/2025 0810)  VTE Prevention/Management: bleeding risk assessed  Intervention: Prevent Infection  Flowsheets (Taken 6/24/2025 0810)  Infection Prevention:   rest/sleep promoted   single patient room provided  Goal: Optimal Comfort and Wellbeing  Outcome: Progressing  Intervention: Monitor Pain and Promote Comfort  Flowsheets (Taken 6/24/2025 0810)  Pain Management Interventions:   relaxation techniques promoted   quiet environment facilitated  Intervention: Provide Person-Centered Care  Flowsheets (Taken 6/24/2025 0810)  Trust Relationship/Rapport:   care explained   thoughts/feelings acknowledged     Problem: Fall Injury Risk  Goal: Absence of Fall and Fall-Related Injury  Outcome: Progressing  Intervention: Identify and Manage Contributors  Flowsheets (Taken 6/24/2025 0810)  Self-Care Promotion: BADL personal objects within reach  Medication Review/Management: medications reviewed

## 2025-06-24 NOTE — PROGRESS NOTES
[facility] Hospital Medicine Progress Note        Chief Complaint: Inpatient Follow-up for Syncope    HPI:           [age] old nursing home resident  woman with a history of Alzheimer's dementia, HTN, chronic heart failure, T2 dm, wheelchair dependent at baseline sent from nursing home due to episodes of unresponsiveness not associated with seizure activity, shortness for breath.  At presentation she was afebrile, hemodynamically stable.  CT head showed no acute changes.  Labs showed chronic anemia, stable platelet count, no major electrolyte abnormalities.  Troponin was within normal limits.  UA was suspicious for UTI and samples were sent for cultures.  Patient admitted to hospital medicine service.  When seen at bedside she was alert, resting in the bed.  She was disoriented, baseline mental status.  Daughter was at bedside contributed to HPI.  At baseline patient needs assistance with feeding, ADLs and IADLs.     Interval Hx:   Afebrile.  Confused.  Follows commands.  Sitter was placed at bedside.  No family members at bedside.  Currently NPO pending C SLP clearance.  Hemoglobin and platelets stable.  No new major electrolyte abnormality seen.  TTE pending.  Carotid Doppler showed less than 50% stenosis bilaterally      Objective/physical exam:  Vitals:    06/24/25 0300 06/24/25 0600 06/24/25 0741 06/24/25 0743   BP: (!) 119/93 139/72 (!) 155/100 (!) 148/70   BP Location:  Right arm     Patient Position:   Lying    Pulse:  85 100 (!) 111   Resp:  18 19    Temp:   97.9 °F (36.6 °C)    TempSrc:   Oral    SpO2:  98% 97%    Weight:       Height:         General: In no acute distress, afebrile  Respiratory: Clear to auscultation bilaterally  Cardiovascular: S1, S2, no appreciable murmur  Abdomen: Soft, nontender, BS +  MSK: Warm, no lower extremity edema, no clubbing or cyanosis  Neurologic:  Alert, disoriented, follows few commands, moves all extremities        Lab Results   Component Value Date     06/24/2025    K 4.0 06/24/2025     (H) 06/24/2025    CO2 22 (L) 06/24/2025    BUN 14.3 06/24/2025    CREATININE 0.70 06/24/2025    CALCIUM 8.6 06/24/2025    ANIONGAP 6 (L) 08/17/2016    ESTGFRAFRICA >60.0 08/17/2016    EGFRNONAA 84 (L) 11/12/2020      Lab Results   Component Value Date    ALT 12 06/24/2025    AST 16 06/24/2025    ALKPHOS 88 06/24/2025    BILITOT 0.3 06/24/2025      Lab Results   Component Value Date    WBC 5.11 06/24/2025    HGB 10.7 (L) 06/24/2025    HCT 32.7 (L) 06/24/2025    MCV 94.0 06/24/2025     06/24/2025           Medications:   amLODIPine  2.5 mg Oral Daily    atorvastatin  40 mg Oral Daily    cefTRIAXone (Rocephin) IV (PEDS and ADULTS)  1 g Intravenous Q24H    divalproex ER  250 mg Oral Daily    enoxparin  40 mg Subcutaneous Q24H (prophylaxis, 1700)    FLUoxetine  10 mg Oral Daily    hydrALAZINE        latanoprost  1 drop Both Eyes QHS    OXcarbazepine  150 mg Oral Daily    oxybutynin  5 mg Oral Daily    polyethylene glycol  17 g Oral Daily    rivastigmine tartrate  3 mg Oral BID        Current Facility-Administered Medications:     acetaminophen, 650 mg, Oral, Q4H PRN    dextrose 50%, 12.5 g, Intravenous, PRN    dextrose 50%, 25 g, Intravenous, PRN    glucagon (human recombinant), 1 mg, Intramuscular, PRN    glucose, 16 g, Oral, PRN    glucose, 24 g, Oral, PRN    hydrALAZINE, , ,     hydrALAZINE, 10 mg, Intravenous, Q2H PRN    melatonin, 6 mg, Oral, Nightly PRN    naloxone, 0.02 mg, Intravenous, PRN    ondansetron, 4 mg, Intravenous, Q8H PRN     Assessment/Plan:    Syncope   Delirium on dementia  Possible polypharmacy  Possible UTI-POA     HX: As mentioned above    Plan:   -use Haldol as needed.  Add Seroquel from tonight.  Psych eval if necessary.  Continue sitter  -urine cultures negative thus far.  Currently on ceftriaxone empirically  -TTE pending.  Carotid Doppler showed less than 50% stenosis bilaterally.  Continue  telemetry   -avoid polypharmacy.  Home meds reviewed.  Essential medications continued  -advance p.o. intake as tolerated.  SLP consulted     Darwin Deal MD

## 2025-06-25 LAB
ALBUMIN SERPL-MCNC: 3.2 G/DL (ref 3.4–4.8)
ALBUMIN/GLOB SERPL: 0.9 RATIO (ref 1.1–2)
ALP SERPL-CCNC: 87 UNIT/L (ref 40–150)
ALT SERPL-CCNC: 12 UNIT/L (ref 0–55)
ANION GAP SERPL CALC-SCNC: 6 MEQ/L
AORTIC SIZE INDEX (SOV): 1.6 CM/M2
APICAL FOUR CHAMBER EJECTION FRACTION: 61 %
APICAL TWO CHAMBER EJECTION FRACTION: 64 %
AST SERPL-CCNC: 21 UNIT/L (ref 11–45)
AV INDEX (PROSTH): 0.55
AV MEAN GRADIENT: 11 MMHG
AV PEAK GRADIENT: 19 MMHG
AV VALVE AREA BY VELOCITY RATIO: 1.9 CM²
AV VALVE AREA: 1.7 CM²
AV VELOCITY RATIO: 0.59
BACTERIA UR CULT: NO GROWTH
BASOPHILS # BLD AUTO: 0.05 X10(3)/MCL
BASOPHILS NFR BLD AUTO: 0.9 %
BILIRUB SERPL-MCNC: 0.3 MG/DL
BSA FOR ECHO PROCEDURE: 1.92 M2
BUN SERPL-MCNC: 12.4 MG/DL (ref 9.8–20.1)
CALCIUM SERPL-MCNC: 8.7 MG/DL (ref 8.4–10.2)
CHLORIDE SERPL-SCNC: 112 MMOL/L (ref 98–107)
CO2 SERPL-SCNC: 23 MMOL/L (ref 23–31)
CREAT SERPL-MCNC: 0.71 MG/DL (ref 0.55–1.02)
CREAT/UREA NIT SERPL: 17
CV ECHO LV RWT: 0.52 CM
DOP CALC AO PEAK VEL: 2.2 M/S
DOP CALC AO VTI: 51.2 CM
DOP CALC LVOT AREA: 3.1 CM2
DOP CALC LVOT DIAMETER: 2 CM
DOP CALC LVOT PEAK VEL: 1.3 M/S
DOP CALC LVOT STROKE VOLUME: 88.5 CM3
DOP CALC MV VTI: 30.8 CM
DOP CALCLVOT PEAK VEL VTI: 28.2 CM
E WAVE DECELERATION TIME: 269 MSEC
E/A RATIO: 0.76
E/E' RATIO: 11 M/S
ECHO LV POSTERIOR WALL: 1.1 CM (ref 0.6–1.1)
EOSINOPHIL # BLD AUTO: 0.14 X10(3)/MCL (ref 0–0.9)
EOSINOPHIL NFR BLD AUTO: 2.5 %
ERYTHROCYTE [DISTWIDTH] IN BLOOD BY AUTOMATED COUNT: 14 % (ref 11.5–17)
FRACTIONAL SHORTENING: 35.7 % (ref 28–44)
GFR SERPLBLD CREATININE-BSD FMLA CKD-EPI: >60 ML/MIN/1.73/M2
GLOBULIN SER-MCNC: 3.6 GM/DL (ref 2.4–3.5)
GLUCOSE SERPL-MCNC: 85 MG/DL (ref 82–115)
HCT VFR BLD AUTO: 32.3 % (ref 37–47)
HGB BLD-MCNC: 10.8 G/DL (ref 12–16)
HR MV ECHO: 71 BPM
IMM GRANULOCYTES # BLD AUTO: 0.01 X10(3)/MCL (ref 0–0.04)
IMM GRANULOCYTES NFR BLD AUTO: 0.2 %
INTERVENTRICULAR SEPTUM: 1.2 CM (ref 0.6–1.1)
LEFT ATRIUM AREA SYSTOLIC (APICAL 2 CHAMBER): 21.4 CM2
LEFT ATRIUM AREA SYSTOLIC (APICAL 4 CHAMBER): 19.6 CM2
LEFT ATRIUM SIZE: 3.8 CM
LEFT ATRIUM VOLUME INDEX MOD: 31 ML/M2
LEFT ATRIUM VOLUME MOD: 59 ML
LEFT INTERNAL DIMENSION IN SYSTOLE: 2.7 CM (ref 2.1–4)
LEFT VENTRICLE DIASTOLIC VOLUME INDEX: 42.02 ML/M2
LEFT VENTRICLE DIASTOLIC VOLUME: 79 ML
LEFT VENTRICLE END DIASTOLIC VOLUME APICAL 2 CHAMBER: 70.8 ML
LEFT VENTRICLE END DIASTOLIC VOLUME APICAL 4 CHAMBER: 92.3 ML
LEFT VENTRICLE END SYSTOLIC VOLUME APICAL 2 CHAMBER: 65.5 ML
LEFT VENTRICLE END SYSTOLIC VOLUME APICAL 4 CHAMBER: 50.2 ML
LEFT VENTRICLE MASS INDEX: 89.1 G/M2
LEFT VENTRICLE SYSTOLIC VOLUME INDEX: 14.4 ML/M2
LEFT VENTRICLE SYSTOLIC VOLUME: 27 ML
LEFT VENTRICULAR INTERNAL DIMENSION IN DIASTOLE: 4.2 CM (ref 3.5–6)
LEFT VENTRICULAR MASS: 167.4 G
LV LATERAL E/E' RATIO: 11 M/S
LV SEPTAL E/E' RATIO: 11 M/S
LVED V (TEICH): 78.6 ML
LVES V (TEICH): 27 ML
LVOT MG: 3 MMHG
LVOT MV: 0.84 CM/S
LYMPHOCYTES # BLD AUTO: 3.31 X10(3)/MCL (ref 0.6–4.6)
LYMPHOCYTES NFR BLD AUTO: 59.4 %
MAGNESIUM SERPL-MCNC: 1.7 MG/DL (ref 1.6–2.6)
MCH RBC QN AUTO: 31.2 PG (ref 27–31)
MCHC RBC AUTO-ENTMCNC: 33.4 G/DL (ref 33–36)
MCV RBC AUTO: 93.4 FL (ref 80–94)
MONOCYTES # BLD AUTO: 0.52 X10(3)/MCL (ref 0.1–1.3)
MONOCYTES NFR BLD AUTO: 9.3 %
MV MEAN GRADIENT: 3 MMHG
MV PEAK A VEL: 1.16 M/S
MV PEAK E VEL: 0.88 M/S
MV PEAK GRADIENT: 6 MMHG
MV STENOSIS PRESSURE HALF TIME: 103 MS
MV VALVE AREA BY CONTINUITY EQUATION: 2.87 CM2
MV VALVE AREA P 1/2 METHOD: 2.14 CM2
NEUTROPHILS # BLD AUTO: 1.54 X10(3)/MCL (ref 2.1–9.2)
NEUTROPHILS NFR BLD AUTO: 27.7 %
NRBC BLD AUTO-RTO: 0 %
OHS LV EJECTION FRACTION SIMPSONS BIPLANE MOD: 62 %
PLATELET # BLD AUTO: 232 X10(3)/MCL (ref 130–400)
PMV BLD AUTO: 10 FL (ref 7.4–10.4)
POTASSIUM SERPL-SCNC: 3.8 MMOL/L (ref 3.5–5.1)
PROT SERPL-MCNC: 6.8 GM/DL (ref 5.8–7.6)
RA PRESSURE ESTIMATED: 3 MMHG
RBC # BLD AUTO: 3.46 X10(6)/MCL (ref 4.2–5.4)
SINUS: 3 CM
SODIUM SERPL-SCNC: 141 MMOL/L (ref 136–145)
TDI LATERAL: 0.08 M/S
TDI SEPTAL: 0.08 M/S
TDI: 0.08 M/S
TRICUSPID ANNULAR PLANE SYSTOLIC EXCURSION: 1.9 CM
WBC # BLD AUTO: 5.57 X10(3)/MCL (ref 4.5–11.5)
Z-SCORE OF LEFT VENTRICULAR DIMENSION IN END DIASTOLE: -2.23
Z-SCORE OF LEFT VENTRICULAR DIMENSION IN END SYSTOLE: -1.44

## 2025-06-25 PROCEDURE — 80053 COMPREHEN METABOLIC PANEL: CPT | Performed by: INTERNAL MEDICINE

## 2025-06-25 PROCEDURE — 21400001 HC TELEMETRY ROOM

## 2025-06-25 PROCEDURE — 85025 COMPLETE CBC W/AUTO DIFF WBC: CPT | Performed by: INTERNAL MEDICINE

## 2025-06-25 PROCEDURE — 25000003 PHARM REV CODE 250: Performed by: INTERNAL MEDICINE

## 2025-06-25 PROCEDURE — 36415 COLL VENOUS BLD VENIPUNCTURE: CPT | Performed by: INTERNAL MEDICINE

## 2025-06-25 PROCEDURE — 63600175 PHARM REV CODE 636 W HCPCS: Performed by: INTERNAL MEDICINE

## 2025-06-25 PROCEDURE — 83735 ASSAY OF MAGNESIUM: CPT | Performed by: INTERNAL MEDICINE

## 2025-06-25 RX ORDER — HALOPERIDOL LACTATE 5 MG/ML
5 INJECTION, SOLUTION INTRAMUSCULAR EVERY 6 HOURS PRN
Status: DISCONTINUED | OUTPATIENT
Start: 2025-06-25 | End: 2025-06-26 | Stop reason: HOSPADM

## 2025-06-25 RX ADMIN — OXCARBAZEPINE 150 MG: 150 TABLET, FILM COATED ORAL at 09:06

## 2025-06-25 RX ADMIN — AMLODIPINE BESYLATE 2.5 MG: 2.5 TABLET ORAL at 09:06

## 2025-06-25 RX ADMIN — POLYETHYLENE GLYCOL 3350 17 G: 17 POWDER, FOR SOLUTION ORAL at 09:06

## 2025-06-25 RX ADMIN — FLUOXETINE HYDROCHLORIDE 10 MG: 10 CAPSULE ORAL at 09:06

## 2025-06-25 RX ADMIN — DIVALPROEX SODIUM 250 MG: 250 TABLET, EXTENDED RELEASE ORAL at 09:06

## 2025-06-25 RX ADMIN — RIVASTIGMINE TARTRATE 3 MG: 1.5 CAPSULE ORAL at 09:06

## 2025-06-25 RX ADMIN — SODIUM CHLORIDE: 9 INJECTION, SOLUTION INTRAVENOUS at 09:06

## 2025-06-25 RX ADMIN — Medication 6 MG: at 10:06

## 2025-06-25 RX ADMIN — ENOXAPARIN SODIUM 40 MG: 40 INJECTION SUBCUTANEOUS at 05:06

## 2025-06-25 RX ADMIN — CEFTRIAXONE SODIUM 1 G: 1 INJECTION, POWDER, FOR SOLUTION INTRAMUSCULAR; INTRAVENOUS at 02:06

## 2025-06-25 RX ADMIN — OXYBUTYNIN CHLORIDE 5 MG: 5 TABLET ORAL at 09:06

## 2025-06-25 RX ADMIN — ATORVASTATIN CALCIUM 40 MG: 40 TABLET, FILM COATED ORAL at 09:06

## 2025-06-25 RX ADMIN — RIVASTIGMINE TARTRATE 3 MG: 1.5 CAPSULE ORAL at 10:06

## 2025-06-25 NOTE — CONSULTS
"6/25/2025  Kathleen aGndhi   1945   [id number]            Psychiatry Initial Consult Note     Date of Admission: 6/23/2025 10:03 AM    Chief Complaint: "Psych consult for delirium on Dementia"    SUBJECTIVE:   History of Present Illness:   Kathleen Gandhi is a 79 y.o. female with a past psychiatric history of dementia, HTN, chronic heart failure, and T2DM who presented to [facility] on 06/23/25 from nursing home due to episodes of unresponsiveness not associated with seizure activity. CT head showed no acute changes. Labs showed chronic anemia, stable platelet count, no major electrolyte abnormalities. Troponin was within normal limits. UA was suspicious for UTI and samples were sent for cultures.     Evaluated at the bedside with 1:1 sitter present. Ms. Gandhi is pleasant, polite, and overall cooperative. Thought process is disorganized and confused and she provides minimal history. She is oriented to self only and displays impaired attention. Endorses euthymic mood and displays mood-congruent affect. Denies SI/HI. Denies hallucinations and not observed to be responding to internal stimuli. Sitter was with her yesterday and reports that she displayed more agitation yesterday but has displayed no behavioral issues yesterday. Staff unsure of sleep overnight but was asleep with shift change this morning.         Past Psychiatric History:   Previous Psychiatric Hospitalizations: Unable to assess  Previous Medication Trials: Seroquel, aricept, namenda   Previous Suicide Attempts: No history    Outpatient psychiatrist: None    Current Medications:   Home Psychiatric Meds: Divalproex ER 250mg PO QD; Prozac 10mg PO QD, lorazepam 0.5mg PO BID, Oxcarbazepine 150mg PO BID; Seroquel 225mg QD, rivastigmine 3mg    Past Medical/Surgical History:   Past Medical History:   Diagnosis Date    Cellulitis     Hypertension      Past Surgical History:   Procedure Laterality Date    HYSTERECTOMY           Family Psychiatric History:   Unable to " assess     Allergies:   Review of patient's allergies indicates:  No Known Allergies    Substance Abuse History:   Tobacco: Former  Alcohol: No history   Illicit Substances: No history  Treatment: No history        Scheduled Meds:    amLODIPine  2.5 mg Oral Daily    atorvastatin  40 mg Oral Daily    cefTRIAXone (Rocephin) IV (PEDS and ADULTS)  1 g Intravenous Q24H    divalproex ER  250 mg Oral Daily    enoxparin  40 mg Subcutaneous Q24H (prophylaxis, 1700)    FLUoxetine  10 mg Oral Daily    latanoprost  1 drop Both Eyes QHS    OXcarbazepine  150 mg Oral Daily    oxybutynin  5 mg Oral Daily    polyethylene glycol  17 g Oral Daily    rivastigmine tartrate  3 mg Oral BID      PRN Meds:   Current Facility-Administered Medications:     acetaminophen, 650 mg, Oral, Q4H PRN    dextrose 50%, 12.5 g, Intravenous, PRN    dextrose 50%, 25 g, Intravenous, PRN    glucagon (human recombinant), 1 mg, Intramuscular, PRN    glucose, 16 g, Oral, PRN    glucose, 24 g, Oral, PRN    haloperidol lactate, 5 mg, Intravenous, Daily PRN    hydrALAZINE, 10 mg, Intravenous, Q2H PRN    melatonin, 6 mg, Oral, Nightly PRN    naloxone, 0.02 mg, Intravenous, PRN    ondansetron, 4 mg, Intravenous, Q8H PRN    QUEtiapine, 50 mg, Oral, Nightly PRN   Psychotherapeutics (From admission, onward)      Start     Stop Route Frequency Ordered    06/24/25 1159  haloperidol lactate injection 5 mg         06/27/25 1158 IV Daily PRN 06/24/25 1100    06/24/25 1159  QUEtiapine tablet 50 mg         -- Oral Nightly PRN 06/24/25 1100    06/24/25 0900  FLUoxetine capsule 10 mg         -- Oral Daily 06/23/25 1416              Social History:  Housing Status: Nursing home  Relationship Status/Sexual Orientation:    Children: 5  Education: 9th grade   Employment Status/Info: Retired         OBJECTIVE:     Vitals   Vitals:    06/25/25 1111   BP: (!) 141/83   Pulse: 73   Resp: 18   Temp: 98.1 °F (36.7 °C)        Labs/Imaging/Studies:   Recent Results (from the past  48 hours)   CV Ultrasound Bilateral Doppler Carotid    Collection Time: 06/23/25  5:12 PM   Result Value Ref Range    Right CCA prox sys 36.00 cm/s    Right CCA dist sys 49.80 cm/s    Right ICA prox sys 41.40 cm/s    Right ICA prox pena 7.80 cm/s    Right ECA sys 90.00 cm/s    Right vertebral sys 43.80 cm/s    Right ICA/CCA ratio 0.83     Right Highest ICA 41.40     Right Highest EDV 7.80     Right Highest CCA 50     Left CCA prox sys 71.90 cm/s    Left CCA dist sys 54.90 cm/s    Left ICA prox sys 33.00 cm/s    Left ICA prox pena 0.00 cm/s    Left ICA mid sys 90.00 cm/s    Left ICA mid epna 10.20 cm/s    Left ECA sys 51.00 cm/s    Left vertebral sys 47.40 cm/s    Left ICA/CCA ratio 1.64     Left Highest ICA 90.00     LT Highest EDV 10.20     Left Highest CCA 72    Comprehensive Metabolic Panel (CMP)    Collection Time: 06/24/25  3:50 AM   Result Value Ref Range    Sodium 142 136 - 145 mmol/L    Potassium 4.0 3.5 - 5.1 mmol/L    Chloride 113 (H) 98 - 107 mmol/L    CO2 22 (L) 23 - 31 mmol/L    Glucose 85 82 - 115 mg/dL    Blood Urea Nitrogen 14.3 9.8 - 20.1 mg/dL    Creatinine 0.70 0.55 - 1.02 mg/dL    Calcium 8.6 8.4 - 10.2 mg/dL    Protein Total 6.5 5.8 - 7.6 gm/dL    Albumin 3.0 (L) 3.4 - 4.8 g/dL    Globulin 3.5 2.4 - 3.5 gm/dL    Albumin/Globulin Ratio 0.9 (L) 1.1 - 2.0 ratio    Bilirubin Total 0.3 <=1.5 mg/dL    ALP 88 40 - 150 unit/L    ALT 12 0 - 55 unit/L    AST 16 11 - 45 unit/L    eGFR >60 mL/min/1.73/m2    Anion Gap 7.0 mEq/L    BUN/Creatinine Ratio 20    Magnesium    Collection Time: 06/24/25  3:50 AM   Result Value Ref Range    Magnesium Level 1.90 1.60 - 2.60 mg/dL   CBC with Differential    Collection Time: 06/24/25  3:50 AM   Result Value Ref Range    WBC 5.11 4.50 - 11.50 x10(3)/mcL    RBC 3.48 (L) 4.20 - 5.40 x10(6)/mcL    Hgb 10.7 (L) 12.0 - 16.0 g/dL    Hct 32.7 (L) 37.0 - 47.0 %    MCV 94.0 80.0 - 94.0 fL    MCH 30.7 27.0 - 31.0 pg    MCHC 32.7 (L) 33.0 - 36.0 g/dL    RDW 14.2 11.5 - 17.0 %     Platelet 238 130 - 400 x10(3)/mcL    MPV 10.0 7.4 - 10.4 fL    Neut % 36.0 %    Lymph % 49.7 %    Mono % 10.2 %    Eos % 2.7 %    Basophil % 1.2 %    Imm Grans % 0.2 %    Neut # 1.84 (L) 2.1 - 9.2 x10(3)/mcL    Lymph # 2.54 0.6 - 4.6 x10(3)/mcL    Mono # 0.52 0.1 - 1.3 x10(3)/mcL    Eos # 0.14 0 - 0.9 x10(3)/mcL    Baso # 0.06 <=0.2 x10(3)/mcL    Imm Gran # 0.01 0.00 - 0.04 x10(3)/mcL    NRBC% 0.0 %   Comprehensive Metabolic Panel (CMP)    Collection Time: 06/25/25  3:49 AM   Result Value Ref Range    Sodium 141 136 - 145 mmol/L    Potassium 3.8 3.5 - 5.1 mmol/L    Chloride 112 (H) 98 - 107 mmol/L    CO2 23 23 - 31 mmol/L    Glucose 85 82 - 115 mg/dL    Blood Urea Nitrogen 12.4 9.8 - 20.1 mg/dL    Creatinine 0.71 0.55 - 1.02 mg/dL    Calcium 8.7 8.4 - 10.2 mg/dL    Protein Total 6.8 5.8 - 7.6 gm/dL    Albumin 3.2 (L) 3.4 - 4.8 g/dL    Globulin 3.6 (H) 2.4 - 3.5 gm/dL    Albumin/Globulin Ratio 0.9 (L) 1.1 - 2.0 ratio    Bilirubin Total 0.3 <=1.5 mg/dL    ALP 87 40 - 150 unit/L    ALT 12 0 - 55 unit/L    AST 21 11 - 45 unit/L    eGFR >60 mL/min/1.73/m2    Anion Gap 6.0 mEq/L    BUN/Creatinine Ratio 17    Magnesium    Collection Time: 06/25/25  3:49 AM   Result Value Ref Range    Magnesium Level 1.70 1.60 - 2.60 mg/dL   CBC with Differential    Collection Time: 06/25/25  3:49 AM   Result Value Ref Range    WBC 5.57 4.50 - 11.50 x10(3)/mcL    RBC 3.46 (L) 4.20 - 5.40 x10(6)/mcL    Hgb 10.8 (L) 12.0 - 16.0 g/dL    Hct 32.3 (L) 37.0 - 47.0 %    MCV 93.4 80.0 - 94.0 fL    MCH 31.2 (H) 27.0 - 31.0 pg    MCHC 33.4 33.0 - 36.0 g/dL    RDW 14.0 11.5 - 17.0 %    Platelet 232 130 - 400 x10(3)/mcL    MPV 10.0 7.4 - 10.4 fL    Neut % 27.7 %    Lymph % 59.4 %    Mono % 9.3 %    Eos % 2.5 %    Basophil % 0.9 %    Imm Grans % 0.2 %    Neut # 1.54 (L) 2.1 - 9.2 x10(3)/mcL    Lymph # 3.31 0.6 - 4.6 x10(3)/mcL    Mono # 0.52 0.1 - 1.3 x10(3)/mcL    Eos # 0.14 0 - 0.9 x10(3)/mcL    Baso # 0.05 <=0.2 x10(3)/mcL    Imm Gran # 0.01 0.00 -  0.04 x10(3)/mcL    NRBC% 0.0 %      Lab Results   Component Value Date    VALPROATE 23.9 (L) 03/19/2025           Psychiatric Mental Status Exam:  General Appearance: appears stated age, dressed in hospital garb, lying in bed, in no acute distress  Arousal: alert  Behavior: cooperative, pleasant, polite, restless and fidgety  Movements and Motor Activity: no tics, no tremors, no akathisia, no dystonia, no evidence of tardive dyskinesia  Orientation: oriented to person only  Speech: coherent  Mood: Euthymic  Affect: normal, euthymic, reactive, full-range, mood-congruent, appropriate to situation and context  Thought Process: disorganized  Associations: no loosening of associations  Thought Content and Perceptions: no suicidal or homicidal ideation, no auditory or visual hallucinations, no paranoid ideation, no ideas of reference, no evidence of delusions or psychosis  Recent and Remote Memory: impaired; per interview/observation with patient  Attention and Concentration: impaired; per interview/observation with patient  Fund of Knowledge: vocabulary appropriate; based on history, vocabulary, fund of knowledge, syntax, grammar, and content  Insight: impaired; based on understanding of severity of illness and HPI  Judgment: impaired; based on patient's behavior and HPI          ASSESSMENT/PLAN:   Diagnoses:  Major Neurocognitive disorder, probable Alzheimer's, severe, without behavioral disturbance    Past Medical History:   Diagnosis Date    Cellulitis     Hypertension           Problem lists and Management Plans:  Medication Management  Continue fluoxetine 10mg PO QD  Continue rivastigmine 3mg PO BID  Haloperidol 5mg IV Q6hr PRN agitation  Discontinue PRN seroquel  Monitoring  EKG on 06/23/25 with Qtc of 454ms  Legal  PEC not recommended. Low risk of imminent harm to self or others.  Psychiatry will sign-off      Calvin Serrano

## 2025-06-25 NOTE — PLAN OF CARE
06/25/25 1025   Discharge Assessment   Assessment Type Discharge Planning Assessment   People in Home facility resident   Facility Arrived From: [facility] [place]   Do you expect to return to your current living situation? Yes   Current cognitive status: Not Oriented to Person;Not Oriented to Place;Not Oriented to Time   Discharge Plan A Return to nursing home   Discharge Plan B Return to Nursing Home     The patient is a resident at [facility].

## 2025-06-25 NOTE — PT/OT/SLP DISCHARGE
[facility]  Speech Language Pathology Department  Diet Tolerance Follow-up    Patient Name:  Kathleen Gandhi   MRN:  [id number]  Admitting Diagnosis: unresponsiveness     Recommendations     General recommendations:  SLP intervention not indicated  Solid texture recommendation:  Soft & Bite Sized Diet - IDDSI Level 6  Liquid consistency recommendation: Thin liquids - IDDSI Level 0   Medications: crushed in puree  Swallow strategies/precautions: supervision with meals, assist as needed   Precautions: aspiration    Diet Tolerance     Nursing reports no difficulty regarding diet tolerance.    Outcome Measures     Functional Oral Intake Scale: 5 - Total oral diet with multiple consistencies, by requiring special preparation or compensations    Plan     Skilled SLP services no longer warranted, please reconsult as needed.      06/25/2025

## 2025-06-25 NOTE — PLAN OF CARE
Problem: Adult Inpatient Plan of Care  Goal: Absence of Hospital-Acquired Illness or Injury  Intervention: Prevent and Manage VTE (Venous Thromboembolism) Risk  Flowsheets (Taken 6/25/2025 1830)  VTE Prevention/Management:   bleeding precautions maintained   bleeding risk assessed

## 2025-06-25 NOTE — PROGRESS NOTES
[facility] Hospital Medicine Progress Note        Chief Complaint: Inpatient Follow-up for Syncope    HPI:     [age] old nursing home resident  woman with a history of Alzheimer's dementia, HTN, chronic heart failure, T2 dm, wheelchair dependent at baseline sent from nursing home due to episodes of unresponsiveness not associated with seizure activity, shortness for breath.  At presentation she was afebrile, hemodynamically stable.  CT head showed no acute changes.  Labs showed chronic anemia, stable platelet count, no major electrolyte abnormalities.  Troponin was within normal limits.  UA was suspicious for UTI and samples were sent for cultures.  Patient admitted to hospital medicine service.  When seen at bedside she was alert, resting in the bed.  She was disoriented, baseline mental status.  Daughter was at bedside contributed to HPI.  At baseline patient needs assistance with feeding, ADLs and IADLs.         Interval Hx:     Afebrile.  Blood pressure mildly elevated.  Doing well on room air.  TTE pending      Objective/physical exam:  Vitals:    06/24/25 1123 06/24/25 1619 06/24/25 2000 06/25/25 0700   BP: (!) 161/78 (!) 171/94  104/81   BP Location:       Patient Position: Lying      Pulse: 87 103 101 87   Resp: 18      Temp: 98.1 °F (36.7 °C) 99 °F (37.2 °C)  97.4 °F (36.3 °C)   TempSrc: Oral Oral  Axillary   SpO2: 99% 97%  97%   Weight:       Height:         General: In no acute distress, afebrile  Respiratory: Clear to auscultation bilaterally  Cardiovascular: S1, S2, no appreciable murmur  Abdomen: Soft, nontender, BS +  MSK: Warm, no lower extremity edema, no clubbing or cyanosis  Neurologic:  Alert, disoriented, follows few commands, moves all extremities       Lab Results   Component Value Date     06/25/2025    K 3.8 06/25/2025     (H) 06/25/2025    CO2 23 06/25/2025    BUN 12.4 06/25/2025    CREATININE 0.71 06/25/2025    CALCIUM 8.7  06/25/2025    ANIONGAP 6 (L) 08/17/2016    ESTGFRAFRICA >60.0 08/17/2016    EGFRNONAA 84 (L) 11/12/2020      Lab Results   Component Value Date    ALT 12 06/25/2025    AST 21 06/25/2025    ALKPHOS 87 06/25/2025    BILITOT 0.3 06/25/2025      Lab Results   Component Value Date    WBC 5.57 06/25/2025    HGB 10.8 (L) 06/25/2025    HCT 32.3 (L) 06/25/2025    MCV 93.4 06/25/2025     06/25/2025           Medications:   amLODIPine  2.5 mg Oral Daily    atorvastatin  40 mg Oral Daily    cefTRIAXone (Rocephin) IV (PEDS and ADULTS)  1 g Intravenous Q24H    divalproex ER  250 mg Oral Daily    enoxparin  40 mg Subcutaneous Q24H (prophylaxis, 1700)    FLUoxetine  10 mg Oral Daily    latanoprost  1 drop Both Eyes QHS    OXcarbazepine  150 mg Oral Daily    oxybutynin  5 mg Oral Daily    polyethylene glycol  17 g Oral Daily    rivastigmine tartrate  3 mg Oral BID        Current Facility-Administered Medications:     acetaminophen, 650 mg, Oral, Q4H PRN    dextrose 50%, 12.5 g, Intravenous, PRN    dextrose 50%, 25 g, Intravenous, PRN    glucagon (human recombinant), 1 mg, Intramuscular, PRN    glucose, 16 g, Oral, PRN    glucose, 24 g, Oral, PRN    haloperidol lactate, 5 mg, Intravenous, Daily PRN    hydrALAZINE, 10 mg, Intravenous, Q2H PRN    melatonin, 6 mg, Oral, Nightly PRN    naloxone, 0.02 mg, Intravenous, PRN    ondansetron, 4 mg, Intravenous, Q8H PRN    QUEtiapine, 50 mg, Oral, Nightly PRN     Assessment/Plan:    Syncope   Delirium on dementia  Possible polypharmacy  Possible UTI-POA     HX: As mentioned above    Plan:   -TTE pending.  Carotid Doppler reviewed.  Monitor vital signs   -psych consult for delirium.  Continue current medical management.  Seroquel as necessary.  Haldol for agitation   -urine cultures negative thus far.  We will treat empirically for 3 days total  -home meds reviewed.  Avoid polypharmacy at discharge     Darwin Deal MD

## 2025-06-25 NOTE — PLAN OF CARE
SACHI Wade completed with daughter Ashley, over the phone. She asked to have the form emailed to her at [email or web address]. she states mom is in nursing home and dad is also sick.

## 2025-06-26 VITALS
WEIGHT: 177.5 LBS | HEIGHT: 65 IN | DIASTOLIC BLOOD PRESSURE: 71 MMHG | SYSTOLIC BLOOD PRESSURE: 118 MMHG | RESPIRATION RATE: 20 BRPM | OXYGEN SATURATION: 100 % | HEART RATE: 81 BPM | BODY MASS INDEX: 29.57 KG/M2 | TEMPERATURE: 98 F

## 2025-06-26 PROBLEM — R55 SYNCOPE: Status: ACTIVE | Noted: 2025-06-26

## 2025-06-26 LAB
ALBUMIN SERPL-MCNC: 3.2 G/DL (ref 3.4–4.8)
ALBUMIN/GLOB SERPL: 0.9 RATIO (ref 1.1–2)
ALP SERPL-CCNC: 86 UNIT/L (ref 40–150)
ALT SERPL-CCNC: 13 UNIT/L (ref 0–55)
ANION GAP SERPL CALC-SCNC: 7 MEQ/L
AST SERPL-CCNC: 21 UNIT/L (ref 11–45)
BASOPHILS # BLD AUTO: 0.04 X10(3)/MCL
BASOPHILS NFR BLD AUTO: 0.7 %
BILIRUB SERPL-MCNC: 0.3 MG/DL
BUN SERPL-MCNC: 11.2 MG/DL (ref 9.8–20.1)
CALCIUM SERPL-MCNC: 8.7 MG/DL (ref 8.4–10.2)
CHLORIDE SERPL-SCNC: 109 MMOL/L (ref 98–107)
CO2 SERPL-SCNC: 23 MMOL/L (ref 23–31)
CREAT SERPL-MCNC: 0.75 MG/DL (ref 0.55–1.02)
CREAT/UREA NIT SERPL: 15
EOSINOPHIL # BLD AUTO: 0.12 X10(3)/MCL (ref 0–0.9)
EOSINOPHIL NFR BLD AUTO: 2 %
ERYTHROCYTE [DISTWIDTH] IN BLOOD BY AUTOMATED COUNT: 13.5 % (ref 11.5–17)
GFR SERPLBLD CREATININE-BSD FMLA CKD-EPI: >60 ML/MIN/1.73/M2
GLOBULIN SER-MCNC: 3.7 GM/DL (ref 2.4–3.5)
GLUCOSE SERPL-MCNC: 91 MG/DL (ref 82–115)
HCT VFR BLD AUTO: 35.5 % (ref 37–47)
HGB BLD-MCNC: 11.3 G/DL (ref 12–16)
IMM GRANULOCYTES # BLD AUTO: 0.01 X10(3)/MCL (ref 0–0.04)
IMM GRANULOCYTES NFR BLD AUTO: 0.2 %
LYMPHOCYTES # BLD AUTO: 2.86 X10(3)/MCL (ref 0.6–4.6)
LYMPHOCYTES NFR BLD AUTO: 47.4 %
MAGNESIUM SERPL-MCNC: 1.6 MG/DL (ref 1.6–2.6)
MCH RBC QN AUTO: 30.6 PG (ref 27–31)
MCHC RBC AUTO-ENTMCNC: 31.8 G/DL (ref 33–36)
MCV RBC AUTO: 96.2 FL (ref 80–94)
MONOCYTES # BLD AUTO: 0.51 X10(3)/MCL (ref 0.1–1.3)
MONOCYTES NFR BLD AUTO: 8.5 %
NEUTROPHILS # BLD AUTO: 2.49 X10(3)/MCL (ref 2.1–9.2)
NEUTROPHILS NFR BLD AUTO: 41.2 %
NRBC BLD AUTO-RTO: 0 %
PLATELET # BLD AUTO: 245 X10(3)/MCL (ref 130–400)
PMV BLD AUTO: 10.6 FL (ref 7.4–10.4)
POTASSIUM SERPL-SCNC: 3.9 MMOL/L (ref 3.5–5.1)
PROT SERPL-MCNC: 6.9 GM/DL (ref 5.8–7.6)
RBC # BLD AUTO: 3.69 X10(6)/MCL (ref 4.2–5.4)
SODIUM SERPL-SCNC: 139 MMOL/L (ref 136–145)
WBC # BLD AUTO: 6.03 X10(3)/MCL (ref 4.5–11.5)

## 2025-06-26 PROCEDURE — 80053 COMPREHEN METABOLIC PANEL: CPT | Performed by: INTERNAL MEDICINE

## 2025-06-26 PROCEDURE — 63600175 PHARM REV CODE 636 W HCPCS: Performed by: INTERNAL MEDICINE

## 2025-06-26 PROCEDURE — 25000003 PHARM REV CODE 250: Performed by: INTERNAL MEDICINE

## 2025-06-26 PROCEDURE — 85025 COMPLETE CBC W/AUTO DIFF WBC: CPT | Performed by: INTERNAL MEDICINE

## 2025-06-26 PROCEDURE — 83735 ASSAY OF MAGNESIUM: CPT | Performed by: INTERNAL MEDICINE

## 2025-06-26 PROCEDURE — 36415 COLL VENOUS BLD VENIPUNCTURE: CPT | Performed by: INTERNAL MEDICINE

## 2025-06-26 RX ORDER — AMLODIPINE BESYLATE 2.5 MG/1
2.5 TABLET ORAL DAILY
Qty: 30 TABLET | Refills: 0 | Status: SHIPPED | OUTPATIENT
Start: 2025-06-26 | End: 2025-07-26

## 2025-06-26 RX ADMIN — RIVASTIGMINE TARTRATE 3 MG: 1.5 CAPSULE ORAL at 09:06

## 2025-06-26 RX ADMIN — OXYBUTYNIN CHLORIDE 5 MG: 5 TABLET ORAL at 09:06

## 2025-06-26 RX ADMIN — HYDRALAZINE HYDROCHLORIDE 10 MG: 20 INJECTION INTRAMUSCULAR; INTRAVENOUS at 06:06

## 2025-06-26 RX ADMIN — HYDRALAZINE HYDROCHLORIDE 10 MG: 20 INJECTION INTRAMUSCULAR; INTRAVENOUS at 05:06

## 2025-06-26 RX ADMIN — POLYETHYLENE GLYCOL 3350 17 G: 17 POWDER, FOR SOLUTION ORAL at 09:06

## 2025-06-26 RX ADMIN — FLUOXETINE HYDROCHLORIDE 10 MG: 10 CAPSULE ORAL at 09:06

## 2025-06-26 RX ADMIN — DIVALPROEX SODIUM 250 MG: 250 TABLET, EXTENDED RELEASE ORAL at 09:06

## 2025-06-26 RX ADMIN — ATORVASTATIN CALCIUM 40 MG: 40 TABLET, FILM COATED ORAL at 09:06

## 2025-06-26 RX ADMIN — OXCARBAZEPINE 150 MG: 150 TABLET, FILM COATED ORAL at 09:06

## 2025-06-26 RX ADMIN — AMLODIPINE BESYLATE 2.5 MG: 2.5 TABLET ORAL at 09:06

## 2025-06-26 NOTE — DISCHARGE SUMMARY
[facility]  Hospital Medicine Discharge Summary    Admit Date: 6/23/2025  Discharge Date and Time: 6/26/20251:16 PM  Admitting Physician: CHIN Team  Discharging Physician: Lenora Patricia MD.  Primary Care Physician: Ena, Primary Doctor      Discharge Diagnoses:  Acute on chronic encephalopathy, suspect polypharmacy   Suspected acute bacterial UTI on admit-ruled out   Chronic encephalopathy secondary to Alzheimer's dementia   Essential HTN-stable   Cardiomyopathy-compensated   Type 2 diabetes mellitus-stable       Hospital Course:   89-year-old female with significant history of Alzheimer's dementia with chronic encephalopathy, HTN, chronic heart failure, type 2 diabetes mellitus, nursing home resident and wheelchair-bound at baseline presented to the ED with complaints of unresponsiveness not associated with seizure activity.  Also endorsed dyspnea, patient was afebrile and hemodynamically stable in the ED, labs with chronic findings-anemia, thrombocytopenia, no other major abnormalities, CT head was negative for any acute changes, troponins negative, UA was suspicious for UTI, patient was initiated on empiric antibiotics and cultures were sent, patient admitted to hospital medicine services.  Disoriented at baseline and pleasantly confused, mentation returned to baseline, patient needs assistance with all activities of daily living at baseline.  Given intermittent impulsivity required one-to-one sitter.  Evaluated by speech therapy and they recommended modified diet, patient tolerating well.  Suspicion for polypharmacy which resulted in unresponsive state, medications adjusted, psych was consulted for delirium, urine cultures negative and therefore antibiotics later discontinued for UTI.  Psych evaluated, pec not recommended, recommended to continue Prozac, rivastigmine, Seroquel was discontinued, patient also additionally takes Klonopin at the nursing home, this was discontinued, patient  was personally evaluated by me on 06/26, awake, alert, but disoriented/confused, she is very pleasant, hemodynamics and labs stable, therefore it was decided to discharge the patient back to nursing home on 06/26, she needs follow up with PCP at nursing home  Vitals:  VITAL SIGNS: 24 HRS MIN & MAX LAST   Temp  Min: 97.5 °F (36.4 °C)  Max: 98.7 °F (37.1 °C) 97.5 °F (36.4 °C)   BP  Min: 118/71  Max: 198/89 118/71   Pulse  Min: 66  Max: 86  81   Resp  Min: 16  Max: 20 20   SpO2  Min: 94 %  Max: 100 % 100 %       Physical Exam:  General appearance:  No acute distress  HENT: Atraumatic   Lungs: Clear to auscultation bilaterally.   Heart: RRR,No edema  Abdomen: Soft, non tender   Extremities: warm  Neuro:  Awake and alert, disoriented and pleasantly confused  Psych/mental status: Appropriate mood and affect.      Procedures Performed: No admission procedures for hospital encounter.     Significant Diagnostic Studies: See Full reports for all details    Recent Labs   Lab 06/24/25  0350 06/25/25  0349 06/26/25  0344   WBC 5.11 5.57 6.03   RBC 3.48* 3.46* 3.69*   HGB 10.7* 10.8* 11.3*   HCT 32.7* 32.3* 35.5*   MCV 94.0 93.4 96.2*   MCH 30.7 31.2* 30.6   MCHC 32.7* 33.4 31.8*   RDW 14.2 14.0 13.5    232 245   MPV 10.0 10.0 10.6*       Recent Labs   Lab 06/24/25  0350 06/25/25  0349 06/26/25  0344    141 139   K 4.0 3.8 3.9   * 112* 109*   CO2 22* 23 23   BUN 14.3 12.4 11.2   CREATININE 0.70 0.71 0.75   GLU 85 85 91   CALCIUM 8.6 8.7 8.7   MG 1.90 1.70 1.60   ALBUMIN 3.0* 3.2* 3.2*   PROT 6.5 6.8 6.9   ALKPHOS 88 87 86   ALT 12 12 13   AST 16 21 21   BILITOT 0.3 0.3 0.3        Microbiology Results (last 7 days)       Procedure Component Value Units Date/Time    Urine culture [[id number]] Collected: 06/23/25 1049    Order Status: Completed Specimen: Urine Updated: 06/25/25 0730     Urine Culture No Growth             Echo    Left Ventricle: The left ventricle is normal in size. Normal wall   thickness. There  "is normal systolic function with a visually estimated   ejection fraction of 60 - 65%. There is diastolic dysfunction.    Right Ventricle: The right ventricle is normal in size Systolic   function is normal. TAPSE is 1.9 cm.    Mitral Valve: There is mitral annular calcification. There is trace   regurgitation.    Pericardium: There is a trivial effusion.         Medication List        CHANGE how you take these medications      amLODIPine 2.5 MG tablet  Commonly known as: NORVASC  Take 1 tablet (2.5 mg total) by mouth once daily. Adjust dosing to attain goal BP less than 130/80  What changed:   medication strength  how much to take  additional instructions            CONTINUE taking these medications      acetaminophen 325 MG tablet  Commonly known as: TYLENOL     atorvastatin 40 MG tablet  Commonly known as: LIPITOR  Take 1 tablet (40 mg total) by mouth once daily.     diaper,brief,adult,disposable Misc  1 each by Misc.(Non-Drug; Combo Route) route 2 (two) times a day.     diclofenac sodium 1 % Gel  Commonly known as: VOLTAREN  Apply 2 g topically every 6 (six) hours as needed.     divalproex  MG 24 hr tablet  Commonly known as: DEPAKOTE ER     ferrous sulfate 325 mg (65 mg iron) Tab tablet  Commonly known as: FEOSOL     FLUoxetine 10 MG capsule     latanoprost 0.005 % ophthalmic solution  Place 1 drop into both eyes every evening. Place 1 drop into both eyes every evening.     OXcarbazepine 150 MG Tab  Commonly known as: TRILEPTAL     oxybutynin 5 MG Tab  Commonly known as: DITROPAN  Take 1 tablet (5 mg total) by mouth once daily.     rivastigmine tartrate 3 MG capsule  Commonly known as: EXELON  Take 1 capsule (3 mg total) by mouth 2 (two) times daily.     underpads 30 X 36 " Pads  1 each by Misc.(Non-Drug; Combo Route) route every evening.            STOP taking these medications      clonazePAM 0.5 MG tablet  Commonly known as: KlonoPIN     hydrALAZINE 10 MG tablet  Commonly known as: APRESOLINE   "   hydrALAZINE 100 MG tablet  Commonly known as: APRESOLINE     hydroCHLOROthiazide 25 MG tablet  Commonly known as: HYDRODIURIL     LORazepam 0.5 MG tablet  Commonly known as: ATIVAN     losartan 100 MG tablet  Commonly known as: COZAAR     QUEtiapine 200 MG Tab  Commonly known as: SEROQUEL     QUEtiapine 25 MG Tab  Commonly known as: SEROQUEL               Where to Get Your Medications        These medications were [facility] to [facility] [place] [address]      Phone: [phone]   amLODIPine 2.5 MG tablet          Explained in detail to the patient about the discharge plan, medications, and follow-up visits. Pt understands and agrees with the treatment plan  Discharge Disposition: Home or Self Care   Discharged Condition: stable  Diet-   Dietary Orders (From admission, onward)       Start     Ordered    06/24/25 1546  Diet Soft & Bite Sized (IDDSI Level 6)  Diet effective now         06/24/25 1546                   Medications Per [place] med rec  Activities as tolerated   Follow-up Information       PCP in 1 week Follow up.                           For further questions contact hospitalist office    Discharge time 33 minutes    For worsening symptoms, chest pain, shortness of breath, increased abdominal pain, high grade fever, stroke or stroke like symptoms, immediately go to the nearest Emergency Room or call 911 as soon as possible.      Lenora Zhang M.D on 6/26/2025. at 1:16 PM.

## 2025-06-26 NOTE — PLAN OF CARE
[facility] sent discharge orders/AVS and clinicals to L[facility] Oband[facility] via [employer]. Lizy Crooks notified. Patient set up  for transport with [facility]. Report packet given to nurse.

## 2025-06-27 ENCOUNTER — LAB REQUISITION (OUTPATIENT)
Dept: LAB | Facility: HOSPITAL | Age: 80
End: 2025-06-27
Payer: MEDICARE

## 2025-06-27 DIAGNOSIS — D50.9 IRON DEFICIENCY ANEMIA, UNSPECIFIED: ICD-10-CM

## 2025-06-27 DIAGNOSIS — E11.21 TYPE 2 DIABETES MELLITUS WITH DIABETIC NEPHROPATHY: ICD-10-CM

## 2025-06-27 DIAGNOSIS — N18.9 CHRONIC KIDNEY DISEASE, UNSPECIFIED: ICD-10-CM

## 2025-06-27 DIAGNOSIS — E56.9 VITAMIN DEFICIENCY, UNSPECIFIED: ICD-10-CM

## 2025-06-27 DIAGNOSIS — E78.5 HYPERLIPIDEMIA, UNSPECIFIED: ICD-10-CM

## 2025-06-27 LAB
ALBUMIN SERPL-MCNC: 3.3 G/DL (ref 3.4–4.8)
ALBUMIN/GLOB SERPL: 0.9 RATIO (ref 1.1–2)
ALP SERPL-CCNC: 141 UNIT/L (ref 40–150)
ALT SERPL-CCNC: 36 UNIT/L (ref 0–55)
ANION GAP SERPL CALC-SCNC: 7 MEQ/L
AST SERPL-CCNC: 48 UNIT/L (ref 11–45)
BASOPHILS # BLD AUTO: 0.04 X10(3)/MCL
BASOPHILS NFR BLD AUTO: 0.5 %
BILIRUB SERPL-MCNC: 0.3 MG/DL
BUN SERPL-MCNC: 11.3 MG/DL (ref 9.8–20.1)
CALCIUM SERPL-MCNC: 8.7 MG/DL (ref 8.4–10.2)
CHLORIDE SERPL-SCNC: 110 MMOL/L (ref 98–107)
CHOLEST SERPL-MCNC: 135 MG/DL
CHOLEST/HDLC SERPL: 4 {RATIO} (ref 0–5)
CO2 SERPL-SCNC: 25 MMOL/L (ref 23–31)
CREAT SERPL-MCNC: 0.7 MG/DL (ref 0.55–1.02)
CREAT/UREA NIT SERPL: 16
EOSINOPHIL # BLD AUTO: 0.06 X10(3)/MCL (ref 0–0.9)
EOSINOPHIL NFR BLD AUTO: 0.8 %
ERYTHROCYTE [DISTWIDTH] IN BLOOD BY AUTOMATED COUNT: 13.8 % (ref 11.5–17)
EST. AVERAGE GLUCOSE BLD GHB EST-MCNC: 99.7 MG/DL
GFR SERPLBLD CREATININE-BSD FMLA CKD-EPI: >60 ML/MIN/1.73/M2
GLOBULIN SER-MCNC: 3.5 GM/DL (ref 2.4–3.5)
GLUCOSE SERPL-MCNC: 81 MG/DL (ref 82–115)
HBA1C MFR BLD: 5.1 %
HCT VFR BLD AUTO: 34.8 % (ref 37–47)
HDLC SERPL-MCNC: 38 MG/DL (ref 35–60)
HGB BLD-MCNC: 11.7 G/DL (ref 12–16)
IMM GRANULOCYTES # BLD AUTO: 0.01 X10(3)/MCL (ref 0–0.04)
IMM GRANULOCYTES NFR BLD AUTO: 0.1 %
LDLC SERPL CALC-MCNC: 81 MG/DL (ref 50–140)
LYMPHOCYTES # BLD AUTO: 2.36 X10(3)/MCL (ref 0.6–4.6)
LYMPHOCYTES NFR BLD AUTO: 30.7 %
MCH RBC QN AUTO: 31.3 PG (ref 27–31)
MCHC RBC AUTO-ENTMCNC: 33.6 G/DL (ref 33–36)
MCV RBC AUTO: 93 FL (ref 80–94)
MONOCYTES # BLD AUTO: 0.64 X10(3)/MCL (ref 0.1–1.3)
MONOCYTES NFR BLD AUTO: 8.3 %
NEUTROPHILS # BLD AUTO: 4.57 X10(3)/MCL (ref 2.1–9.2)
NEUTROPHILS NFR BLD AUTO: 59.6 %
NRBC BLD AUTO-RTO: 0 %
PLATELET # BLD AUTO: 260 X10(3)/MCL (ref 130–400)
PMV BLD AUTO: 10.5 FL (ref 7.4–10.4)
POTASSIUM SERPL-SCNC: 4.1 MMOL/L (ref 3.5–5.1)
PROT SERPL-MCNC: 6.8 GM/DL (ref 5.8–7.6)
RBC # BLD AUTO: 3.74 X10(6)/MCL (ref 4.2–5.4)
SODIUM SERPL-SCNC: 142 MMOL/L (ref 136–145)
TRIGL SERPL-MCNC: 81 MG/DL (ref 37–140)
TSH SERPL-ACNC: 2.21 UIU/ML (ref 0.35–4.94)
VALPROATE SERPL-MCNC: 21.4 UG/ML (ref 50–100)
VLDLC SERPL CALC-MCNC: 16 MG/DL
WBC # BLD AUTO: 7.68 X10(3)/MCL (ref 4.5–11.5)

## 2025-06-27 PROCEDURE — 80061 LIPID PANEL: CPT

## 2025-06-27 PROCEDURE — 83036 HEMOGLOBIN GLYCOSYLATED A1C: CPT

## 2025-06-27 PROCEDURE — 85025 COMPLETE CBC W/AUTO DIFF WBC: CPT

## 2025-06-27 PROCEDURE — 80164 ASSAY DIPROPYLACETIC ACD TOT: CPT

## 2025-06-27 PROCEDURE — 84443 ASSAY THYROID STIM HORMONE: CPT

## 2025-06-27 PROCEDURE — 80053 COMPREHEN METABOLIC PANEL: CPT

## 2025-07-20 ENCOUNTER — HOSPITAL ENCOUNTER (EMERGENCY)
Facility: HOSPITAL | Age: 80
Discharge: HOME | End: 2025-07-20
Attending: STUDENT IN AN ORGANIZED HEALTH CARE EDUCATION/TRAINING PROGRAM
Payer: MEDICARE

## 2025-07-20 VITALS
HEIGHT: 65 IN | RESPIRATION RATE: 12 BRPM | WEIGHT: 177 LBS | TEMPERATURE: 97 F | BODY MASS INDEX: 29.49 KG/M2 | SYSTOLIC BLOOD PRESSURE: 162 MMHG | HEART RATE: 60 BPM | DIASTOLIC BLOOD PRESSURE: 79 MMHG | OXYGEN SATURATION: 98 %

## 2025-07-20 DIAGNOSIS — I10 HTN (HYPERTENSION): ICD-10-CM

## 2025-07-20 DIAGNOSIS — N30.00 ACUTE CYSTITIS WITHOUT HEMATURIA: Primary | ICD-10-CM

## 2025-07-20 LAB
ALBUMIN SERPL-MCNC: 3.4 G/DL (ref 3.4–4.8)
ALBUMIN/GLOB SERPL: 0.9 RATIO (ref 1.1–2)
ALP SERPL-CCNC: 110 UNIT/L (ref 40–150)
ALT SERPL-CCNC: 16 UNIT/L (ref 0–55)
ANION GAP SERPL CALC-SCNC: 11 MEQ/L
AST SERPL-CCNC: 20 UNIT/L (ref 11–45)
BACTERIA #/AREA URNS AUTO: ABNORMAL /HPF
BASOPHILS # BLD AUTO: 0.04 X10(3)/MCL
BASOPHILS NFR BLD AUTO: 0.8 %
BILIRUB SERPL-MCNC: 0.2 MG/DL
BILIRUB UR QL STRIP.AUTO: NEGATIVE
BUN SERPL-MCNC: 17 MG/DL (ref 9.8–20.1)
CALCIUM SERPL-MCNC: 8.5 MG/DL (ref 8.4–10.2)
CHLORIDE SERPL-SCNC: 107 MMOL/L (ref 98–107)
CLARITY UR: ABNORMAL
CO2 SERPL-SCNC: 25 MMOL/L (ref 23–31)
COLOR UR AUTO: YELLOW
CREAT SERPL-MCNC: 0.79 MG/DL (ref 0.55–1.02)
CREAT/UREA NIT SERPL: 22
EOSINOPHIL # BLD AUTO: 0.1 X10(3)/MCL (ref 0–0.9)
EOSINOPHIL NFR BLD AUTO: 2 %
ERYTHROCYTE [DISTWIDTH] IN BLOOD BY AUTOMATED COUNT: 14.4 % (ref 11.5–17)
GFR SERPLBLD CREATININE-BSD FMLA CKD-EPI: >60 ML/MIN/1.73/M2
GLOBULIN SER-MCNC: 3.8 GM/DL (ref 2.4–3.5)
GLUCOSE SERPL-MCNC: 81 MG/DL (ref 82–115)
GLUCOSE UR QL STRIP: NORMAL
HCT VFR BLD AUTO: 34.6 % (ref 37–47)
HGB BLD-MCNC: 11.2 G/DL (ref 12–16)
HGB UR QL STRIP: NEGATIVE
IMM GRANULOCYTES # BLD AUTO: 0.01 X10(3)/MCL (ref 0–0.04)
IMM GRANULOCYTES NFR BLD AUTO: 0.2 %
KETONES UR QL STRIP: NEGATIVE
LEUKOCYTE ESTERASE UR QL STRIP: 75
LYMPHOCYTES # BLD AUTO: 1.84 X10(3)/MCL (ref 0.6–4.6)
LYMPHOCYTES NFR BLD AUTO: 36.5 %
MCH RBC QN AUTO: 31 PG (ref 27–31)
MCHC RBC AUTO-ENTMCNC: 32.4 G/DL (ref 33–36)
MCV RBC AUTO: 95.8 FL (ref 80–94)
MONOCYTES # BLD AUTO: 0.39 X10(3)/MCL (ref 0.1–1.3)
MONOCYTES NFR BLD AUTO: 7.7 %
NEUTROPHILS # BLD AUTO: 2.66 X10(3)/MCL (ref 2.1–9.2)
NEUTROPHILS NFR BLD AUTO: 52.8 %
NITRITE UR QL STRIP: NEGATIVE
NRBC BLD AUTO-RTO: 0 %
OHS QRS DURATION: 92 MS
OHS QTC CALCULATION: 421 MS
PH UR STRIP: 6.5 [PH]
PLATELET # BLD AUTO: 257 X10(3)/MCL (ref 130–400)
PMV BLD AUTO: 9.4 FL (ref 7.4–10.4)
POTASSIUM SERPL-SCNC: 4.3 MMOL/L (ref 3.5–5.1)
PROT SERPL-MCNC: 7.2 GM/DL (ref 5.8–7.6)
PROT UR QL STRIP: NEGATIVE
RBC # BLD AUTO: 3.61 X10(6)/MCL (ref 4.2–5.4)
RBC #/AREA URNS AUTO: ABNORMAL /HPF
SODIUM SERPL-SCNC: 143 MMOL/L (ref 136–145)
SP GR UR STRIP.AUTO: 1.02 (ref 1–1.03)
SQUAMOUS #/AREA URNS LPF: ABNORMAL /HPF
TROPONIN I SERPL-MCNC: <0.01 NG/ML (ref 0–0.04)
UROBILINOGEN UR STRIP-ACNC: NORMAL
WBC # BLD AUTO: 5.04 X10(3)/MCL (ref 4.5–11.5)
WBC #/AREA URNS AUTO: ABNORMAL /HPF

## 2025-07-20 PROCEDURE — 93005 ELECTROCARDIOGRAM TRACING: CPT

## 2025-07-20 PROCEDURE — 84484 ASSAY OF TROPONIN QUANT: CPT

## 2025-07-20 PROCEDURE — 25000003 PHARM REV CODE 250

## 2025-07-20 PROCEDURE — 85025 COMPLETE CBC W/AUTO DIFF WBC: CPT

## 2025-07-20 PROCEDURE — 99284 EMERGENCY DEPT VISIT MOD MDM: CPT | Mod: 25

## 2025-07-20 PROCEDURE — 63600175 PHARM REV CODE 636 W HCPCS

## 2025-07-20 PROCEDURE — 93010 ELECTROCARDIOGRAM REPORT: CPT | Mod: ,,, | Performed by: INTERNAL MEDICINE

## 2025-07-20 PROCEDURE — 81001 URINALYSIS AUTO W/SCOPE: CPT

## 2025-07-20 PROCEDURE — 96372 THER/PROPH/DIAG INJ SC/IM: CPT

## 2025-07-20 PROCEDURE — 80053 COMPREHEN METABOLIC PANEL: CPT

## 2025-07-20 RX ORDER — NITROFURANTOIN 25; 75 MG/1; MG/1
100 CAPSULE ORAL 2 TIMES DAILY
Qty: 10 CAPSULE | Refills: 0 | Status: SHIPPED | OUTPATIENT
Start: 2025-07-20 | End: 2025-07-25

## 2025-07-20 RX ORDER — HYDRALAZINE HYDROCHLORIDE 20 MG/ML
10 INJECTION INTRAMUSCULAR; INTRAVENOUS
Status: COMPLETED | OUTPATIENT
Start: 2025-07-20 | End: 2025-07-20

## 2025-07-20 RX ORDER — CLONIDINE HYDROCHLORIDE 0.2 MG/1
0.2 TABLET ORAL
Status: COMPLETED | OUTPATIENT
Start: 2025-07-20 | End: 2025-07-20

## 2025-07-20 RX ORDER — AMLODIPINE BESYLATE 5 MG/1
5 TABLET ORAL
Status: DISCONTINUED | OUTPATIENT
Start: 2025-07-20 | End: 2025-07-20 | Stop reason: HOSPADM

## 2025-07-20 RX ORDER — AMLODIPINE BESYLATE 5 MG/1
5 TABLET ORAL DAILY
Qty: 30 TABLET | Refills: 0 | Status: SHIPPED | OUTPATIENT
Start: 2025-07-20 | End: 2026-07-20

## 2025-07-20 RX ORDER — AMLODIPINE BESYLATE 2.5 MG/1
2.5 TABLET ORAL
Status: COMPLETED | OUTPATIENT
Start: 2025-07-20 | End: 2025-07-20

## 2025-07-20 RX ADMIN — HYDRALAZINE HYDROCHLORIDE 10 MG: 20 INJECTION INTRAMUSCULAR; INTRAVENOUS at 02:07

## 2025-07-20 RX ADMIN — CLONIDINE HYDROCHLORIDE 0.2 MG: 0.2 TABLET ORAL at 01:07

## 2025-07-20 RX ADMIN — AMLODIPINE BESYLATE 2.5 MG: 2.5 TABLET ORAL at 11:07

## 2025-07-20 NOTE — ED PROVIDER NOTES
"Encounter Date: 7/20/2025       History     Chief Complaint   Patient presents with    Medical Evaluation     Sent from [facility] for spitting out medication this am. NH told son that they are unable to give an additional dose therefore she would need to be seen in ED. Hx of dementia, oriented to self. Reports she drank all her medicine. Denies pain.     See mdm    The history is provided by the EMS personnel and the [facility].     Review of patient's allergies indicates:  No Known Allergies  Past Medical History:   Diagnosis Date    Cellulitis     Hypertension      Past Surgical History:   Procedure Laterality Date    HYSTERECTOMY       Family History   Problem Relation Name Age of Onset    Obesity Mother      Heart disease Father      Obesity Father      Dementia Maternal Aunt      Aneurysm Neg Hx      Cancer Neg Hx      Clotting disorder Neg Hx      Diabetes Neg Hx      Fainting Neg Hx      Hyperlipidemia Neg Hx      Kidney disease Neg Hx      Liver disease Neg Hx      Migraines Neg Hx      Neuropathy Neg Hx      Parkinsonism Neg Hx      Seizures Neg Hx      Stroke Neg Hx      Tremor Neg Hx       Social History[1]  Review of Systems   Constitutional:         "Spit out medications"   Neurological:         [facility]   All other systems reviewed and are negative.      Physical Exam     Initial Vitals [07/20/25 1125]   BP Pulse Resp Temp SpO2   (!) 181/94 65 18 96.8 °F (36 °C) 97 %      MAP       --         Physical Exam    Nursing note and vitals reviewed.  Constitutional: No distress.   HENT:   Head: Normocephalic.   Right Ear: External ear normal.   Left Ear: External ear normal.   Nose: Nose normal. Mouth/Throat: Oropharynx is clear and moist.   Eyes: Pupils are equal, round, and reactive to light.   Neck:   Normal range of motion.  Cardiovascular:  Normal rate.           Pulmonary/Chest: No respiratory distress.   Abdominal: Abdomen is soft. Bowel sounds are normal.   Musculoskeletal: "      Cervical back: Normal range of motion.     Neurological: She is alert. GCS eye subscore is 4. GCS verbal subscore is 4. GCS motor subscore is 6.   Skin: Skin is warm. Capillary refill takes less than 2 seconds.   Psychiatric: She has a normal mood and affect.         ED Course   Procedures  Labs Reviewed   URINALYSIS - Abnormal       Result Value    Color, UA Yellow      Appearance, UA Turbid (*)     Specific Gravity, UA 1.025      pH, UA 6.5      Protein, UA Negative      Glucose, UA Normal      Ketones, UA Negative      Blood, UA Negative      Bilirubin, UA Negative      Urobilinogen, UA Normal      Nitrites, UA Negative      Leukocyte Esterase, UA 75 (*)     RBC, UA 0-5      WBC, UA 0-5      Bacteria, UA Occasional (*)     Squamous Epithelial Cells, UA Trace     COMPREHENSIVE METABOLIC PANEL - Abnormal    Sodium 143      Potassium 4.3      Chloride 107      CO2 25      Glucose 81 (*)     Blood Urea Nitrogen 17.0      Creatinine 0.79      Calcium 8.5      Protein Total 7.2      Albumin 3.4      Globulin 3.8 (*)     Albumin/Globulin Ratio 0.9 (*)     Bilirubin Total 0.2            ALT 16      AST 20      eGFR >60      Anion Gap 11.0      BUN/Creatinine Ratio 22     CBC WITH DIFFERENTIAL - Abnormal    WBC 5.04      RBC 3.61 (*)     Hgb 11.2 (*)     Hct 34.6 (*)     MCV 95.8 (*)     MCH 31.0      MCHC 32.4 (*)     RDW 14.4      Platelet 257      MPV 9.4      Neut % 52.8      Lymph % 36.5      Mono % 7.7      Eos % 2.0      Basophil % 0.8      Imm Grans % 0.2      Neut # 2.66      Lymph # 1.84      Mono # 0.39      Eos # 0.10      Baso # 0.04      Imm Gran # 0.01      NRBC% 0.0     TROPONIN I - Normal    Troponin-I <0.010     CBC W/ AUTO DIFFERENTIAL    Narrative:     The following orders were created for panel order CBC auto differential.  Procedure                               Abnormality         Status                     ---------                               -----------         ------                      CBC with Differential[[id number]]       Abnormal            Final result                 Please view results for these tests on the individual orders.          Imaging Results    None          Medications   amLODIPine tablet 5 mg (5 mg Oral Not Given 7/20/25 1409)   amLODIPine tablet 2.5 mg (2.5 mg Oral Given 7/20/25 1148)   cloNIDine tablet 0.2 mg (0.2 mg Oral Given 7/20/25 1306)   hydrALAZINE injection 10 mg (10 mg Intramuscular Given 7/20/25 1423)     Medical Decision Making  80 year old female patient with a hx of dementia, htn, hld, oab, glaucoma presents per EMS from [facility] for medical evaluation. Per EMS and nurse, patient was being combative at nursing home and when nurse gave patient morning medications, patient spit out medications. Nurse reports that son asked if medications can be given again and nursing home stated that medication could not and patient would need to be evaluated by ED. Patient awake and alert, oriented to self. Patient denies any sob, wheezing, difficulty breathing, chest pain, or chest pressure.     Amount and/or Complexity of Data Reviewed  Labs: ordered.    Risk  Prescription drug management.      Additional MDM:   Differential Diagnosis:   Other: The following diagnoses were also considered and will be evaluated: ACS, Sepsis and Delirium.            ED Course as of 07/20/25 1503   Sun Jul 20, 2025   1413 Patient evaluated. Patient is on 2.5mg amlodipine daily. A dose of 2,5mg amlodipine given to patient to lower b/p. 1 hour later, patient systolic b/p elevated at 190's. Clonidine 0.2mg given to patient. 1 hour later, patient b/p 180/90. Did attempt to give patient 5mg amlodipine but patient spit out medication. Will get EKG and troponin to rule out ACS, will give IM dose of hydralazine 10mg. Patient is also agitated and trying to get out of bed.    [DL]   1457 EKG SR. Patient troponin negative. Patient b/p decreased to 162/79. Patient will be d/c with  amlodipine and increased to 5mg. Patient has positive leuokocytes in urine, will treat with macrobid.  [DL]      ED Course User Index  [DL] Haresh Carvajal NP                               Clinical Impression:  Final diagnoses:  [I10] HTN (hypertension)  [N30.00] Acute cystitis without hematuria (Primary)          ED Disposition Condition    Discharge Stable          ED Prescriptions       Medication Sig Dispense Start Date End Date Auth. Provider    amLODIPine (NORVASC) 5 MG tablet Take 1 tablet (5 mg total) by mouth once daily. 30 tablet 7/20/2025 7/20/2026 Haresh Carvajal NP    nitrofurantoin, macrocrystal-monohydrate, (MACROBID) 100 MG capsule Take 1 capsule (100 mg total) by mouth 2 (two) times daily. for 5 days 10 capsule 7/20/2025 7/25/2025 Haresh Carvajal NP          Follow-up Information       Follow up With Specialties Details Why Contact Info    PCP  In 3 days                     [1]   Social History  Tobacco Use    Smoking status: Former     Passive exposure: Never    Smokeless tobacco: Never   Substance Use Topics    Alcohol use: Not Currently     Comment: occ    Drug use: Not Currently        Haresh Carvajal NP  07/20/25 0502

## 2025-08-11 PROBLEM — R55 SYNCOPE: Status: RESOLVED | Noted: 2025-06-26 | Resolved: 2025-08-11

## 2025-08-22 ENCOUNTER — HOSPITAL ENCOUNTER (INPATIENT)
Facility: HOSPITAL | Age: 80
LOS: 4 days | Discharge: HOME | DRG: 177 | End: 2025-08-26
Attending: STUDENT IN AN ORGANIZED HEALTH CARE EDUCATION/TRAINING PROGRAM | Admitting: STUDENT IN AN ORGANIZED HEALTH CARE EDUCATION/TRAINING PROGRAM
Payer: MEDICARE

## 2025-08-22 DIAGNOSIS — R07.9 CHEST PAIN: ICD-10-CM

## 2025-08-22 DIAGNOSIS — R55 SYNCOPE: ICD-10-CM

## 2025-08-22 DIAGNOSIS — R41.82 AMS (ALTERED MENTAL STATUS): Primary | ICD-10-CM

## 2025-08-22 LAB
ACCEPTIBLE SP GR UR QL: 1.02 (ref 1–1.03)
ALBUMIN SERPL-MCNC: 3.4 G/DL (ref 3.4–4.8)
ALBUMIN/GLOB SERPL: 0.8 RATIO (ref 1.1–2)
ALP SERPL-CCNC: 94 UNIT/L (ref 40–150)
ALT SERPL-CCNC: 18 UNIT/L (ref 0–55)
AMPHET UR QL SCN: NEGATIVE
ANION GAP SERPL CALC-SCNC: 9 MEQ/L
AST SERPL-CCNC: 25 UNIT/L (ref 11–45)
B PERT.PT PRMT NPH QL NAA+NON-PROBE: NOT DETECTED
BACTERIA #/AREA URNS AUTO: ABNORMAL /HPF
BARBITURATE SCN PRESENT UR: NEGATIVE
BASOPHILS # BLD AUTO: 0.04 X10(3)/MCL
BASOPHILS NFR BLD AUTO: 0.9 %
BENZODIAZ UR QL SCN: NEGATIVE
BILIRUB SERPL-MCNC: 0.4 MG/DL
BILIRUB UR QL STRIP.AUTO: NEGATIVE
BUN SERPL-MCNC: 18 MG/DL (ref 9.8–20.1)
C PNEUM DNA NPH QL NAA+NON-PROBE: NOT DETECTED
CALCIUM SERPL-MCNC: 8.9 MG/DL (ref 8.4–10.2)
CANNABINOIDS UR QL SCN: NEGATIVE
CHLORIDE SERPL-SCNC: 106 MMOL/L (ref 98–107)
CLARITY UR: CLEAR
CO2 SERPL-SCNC: 25 MMOL/L (ref 23–31)
COCAINE UR QL SCN: NEGATIVE
COLOR UR AUTO: YELLOW
CREAT SERPL-MCNC: 0.83 MG/DL (ref 0.55–1.02)
CREAT/UREA NIT SERPL: 22
EOSINOPHIL # BLD AUTO: 0.21 X10(3)/MCL (ref 0–0.9)
EOSINOPHIL NFR BLD AUTO: 4.5 %
ERYTHROCYTE [DISTWIDTH] IN BLOOD BY AUTOMATED COUNT: 13.2 % (ref 11.5–17)
FENTANYL UR QL SCN: NEGATIVE
FLUAV AG UPPER RESP QL IA.RAPID: NOT DETECTED
FLUBV AG UPPER RESP QL IA.RAPID: NOT DETECTED
GFR SERPLBLD CREATININE-BSD FMLA CKD-EPI: >60 ML/MIN/1.73/M2
GLOBULIN SER-MCNC: 4.1 GM/DL (ref 2.4–3.5)
GLUCOSE SERPL-MCNC: 95 MG/DL (ref 82–115)
GLUCOSE UR QL STRIP: NORMAL
HADV DNA NPH QL NAA+NON-PROBE: NOT DETECTED
HCOV 229E RNA NPH QL NAA+NON-PROBE: NOT DETECTED
HCOV HKU1 RNA NPH QL NAA+NON-PROBE: NOT DETECTED
HCOV NL63 RNA NPH QL NAA+NON-PROBE: NOT DETECTED
HCOV OC43 RNA NPH QL NAA+NON-PROBE: NOT DETECTED
HCT VFR BLD AUTO: 40.7 % (ref 37–47)
HGB BLD-MCNC: 13.3 G/DL (ref 12–16)
HGB UR QL STRIP: NEGATIVE
HMPV RNA NPH QL NAA+NON-PROBE: NOT DETECTED
HPIV1 RNA NPH QL NAA+NON-PROBE: NOT DETECTED
HPIV2 RNA NPH QL NAA+NON-PROBE: NOT DETECTED
HPIV3 RNA NPH QL NAA+NON-PROBE: NOT DETECTED
HPIV4 RNA NPH QL NAA+NON-PROBE: NOT DETECTED
HYALINE CASTS #/AREA URNS LPF: ABNORMAL /LPF
IMM GRANULOCYTES # BLD AUTO: 0.01 X10(3)/MCL (ref 0–0.04)
IMM GRANULOCYTES NFR BLD AUTO: 0.2 %
KETONES UR QL STRIP: NEGATIVE
LACTATE SERPL-SCNC: 1 MMOL/L (ref 0.5–2.2)
LEUKOCYTE ESTERASE UR QL STRIP: NEGATIVE
LYMPHOCYTES # BLD AUTO: 1.55 X10(3)/MCL (ref 0.6–4.6)
LYMPHOCYTES NFR BLD AUTO: 33.4 %
M PNEUMO DNA NPH QL NAA+NON-PROBE: NOT DETECTED
MAGNESIUM SERPL-MCNC: 1.9 MG/DL (ref 1.6–2.6)
MCH RBC QN AUTO: 31.2 PG (ref 27–31)
MCHC RBC AUTO-ENTMCNC: 32.7 G/DL (ref 33–36)
MCV RBC AUTO: 95.5 FL (ref 80–94)
MDMA UR QL SCN: NEGATIVE
MONOCYTES # BLD AUTO: 0.38 X10(3)/MCL (ref 0.1–1.3)
MONOCYTES NFR BLD AUTO: 8.2 %
MRSA PCR SCRN (OHS): NOT DETECTED
NEUTROPHILS # BLD AUTO: 2.45 X10(3)/MCL (ref 2.1–9.2)
NEUTROPHILS NFR BLD AUTO: 52.8 %
NITRITE UR QL STRIP: NEGATIVE
NRBC BLD AUTO-RTO: 0 %
NT-PROBNP SERPL-MCNC: 333 PG/ML
OHS QRS DURATION: 76 MS
OHS QTC CALCULATION: 444 MS
OPIATES UR QL SCN: NEGATIVE
PCP UR QL: NEGATIVE
PH UR STRIP: 6 [PH]
PH UR: 6 [PH] (ref 3–11)
PLATELET # BLD AUTO: 204 X10(3)/MCL (ref 130–400)
PMV BLD AUTO: 9.9 FL (ref 7.4–10.4)
POTASSIUM SERPL-SCNC: 4.2 MMOL/L (ref 3.5–5.1)
PROT SERPL-MCNC: 7.5 GM/DL (ref 5.8–7.6)
PROT UR QL STRIP: ABNORMAL
RBC # BLD AUTO: 4.26 X10(6)/MCL (ref 4.2–5.4)
RBC #/AREA URNS AUTO: ABNORMAL /HPF
RSV A 5' UTR RNA NPH QL NAA+PROBE: NOT DETECTED
RSV RNA NPH QL NAA+NON-PROBE: NOT DETECTED
RV+EV RNA NPH QL NAA+NON-PROBE: NOT DETECTED
SARS-COV-2 RNA RESP QL NAA+PROBE: NOT DETECTED
SODIUM SERPL-SCNC: 140 MMOL/L (ref 136–145)
SP GR UR STRIP.AUTO: 1.02 (ref 1–1.03)
SQUAMOUS #/AREA URNS LPF: ABNORMAL /HPF
TROPONIN I SERPL HS-MCNC: 3 NG/L
UROBILINOGEN UR STRIP-ACNC: NORMAL
WBC # BLD AUTO: 4.64 X10(3)/MCL (ref 4.5–11.5)
WBC #/AREA URNS AUTO: ABNORMAL /HPF

## 2025-08-22 PROCEDURE — 84484 ASSAY OF TROPONIN QUANT: CPT | Performed by: STUDENT IN AN ORGANIZED HEALTH CARE EDUCATION/TRAINING PROGRAM

## 2025-08-22 PROCEDURE — 83605 ASSAY OF LACTIC ACID: CPT | Performed by: STUDENT IN AN ORGANIZED HEALTH CARE EDUCATION/TRAINING PROGRAM

## 2025-08-22 PROCEDURE — 87637 SARSCOV2&INF A&B&RSV AMP PRB: CPT | Performed by: NURSE PRACTITIONER

## 2025-08-22 PROCEDURE — 63600175 PHARM REV CODE 636 W HCPCS: Performed by: NURSE PRACTITIONER

## 2025-08-22 PROCEDURE — 80053 COMPREHEN METABOLIC PANEL: CPT | Performed by: STUDENT IN AN ORGANIZED HEALTH CARE EDUCATION/TRAINING PROGRAM

## 2025-08-22 PROCEDURE — 87798 DETECT AGENT NOS DNA AMP: CPT | Performed by: NURSE PRACTITIONER

## 2025-08-22 PROCEDURE — 83880 ASSAY OF NATRIURETIC PEPTIDE: CPT | Performed by: STUDENT IN AN ORGANIZED HEALTH CARE EDUCATION/TRAINING PROGRAM

## 2025-08-22 PROCEDURE — 96374 THER/PROPH/DIAG INJ IV PUSH: CPT

## 2025-08-22 PROCEDURE — 99285 EMERGENCY DEPT VISIT HI MDM: CPT | Mod: 25

## 2025-08-22 PROCEDURE — 25500020 PHARM REV CODE 255: Performed by: STUDENT IN AN ORGANIZED HEALTH CARE EDUCATION/TRAINING PROGRAM

## 2025-08-22 PROCEDURE — 93005 ELECTROCARDIOGRAM TRACING: CPT

## 2025-08-22 PROCEDURE — 25000003 PHARM REV CODE 250: Performed by: NURSE PRACTITIONER

## 2025-08-22 PROCEDURE — 93010 ELECTROCARDIOGRAM REPORT: CPT | Mod: ,,, | Performed by: INTERNAL MEDICINE

## 2025-08-22 PROCEDURE — 63600175 PHARM REV CODE 636 W HCPCS: Performed by: STUDENT IN AN ORGANIZED HEALTH CARE EDUCATION/TRAINING PROGRAM

## 2025-08-22 PROCEDURE — 96372 THER/PROPH/DIAG INJ SC/IM: CPT | Performed by: STUDENT IN AN ORGANIZED HEALTH CARE EDUCATION/TRAINING PROGRAM

## 2025-08-22 PROCEDURE — 87040 BLOOD CULTURE FOR BACTERIA: CPT | Performed by: STUDENT IN AN ORGANIZED HEALTH CARE EDUCATION/TRAINING PROGRAM

## 2025-08-22 PROCEDURE — 83735 ASSAY OF MAGNESIUM: CPT | Performed by: STUDENT IN AN ORGANIZED HEALTH CARE EDUCATION/TRAINING PROGRAM

## 2025-08-22 PROCEDURE — 81001 URINALYSIS AUTO W/SCOPE: CPT | Mod: XB | Performed by: STUDENT IN AN ORGANIZED HEALTH CARE EDUCATION/TRAINING PROGRAM

## 2025-08-22 PROCEDURE — 85025 COMPLETE CBC W/AUTO DIFF WBC: CPT | Performed by: STUDENT IN AN ORGANIZED HEALTH CARE EDUCATION/TRAINING PROGRAM

## 2025-08-22 PROCEDURE — 11000001 HC ACUTE MED/SURG PRIVATE ROOM

## 2025-08-22 PROCEDURE — 87154 CUL TYP ID BLD PTHGN 6+ TRGT: CPT | Performed by: STUDENT IN AN ORGANIZED HEALTH CARE EDUCATION/TRAINING PROGRAM

## 2025-08-22 PROCEDURE — 80307 DRUG TEST PRSMV CHEM ANLYZR: CPT | Performed by: STUDENT IN AN ORGANIZED HEALTH CARE EDUCATION/TRAINING PROGRAM

## 2025-08-22 PROCEDURE — 87641 MR-STAPH DNA AMP PROBE: CPT | Performed by: NURSE PRACTITIONER

## 2025-08-22 RX ORDER — IBUPROFEN 200 MG
16 TABLET ORAL
Status: DISCONTINUED | OUTPATIENT
Start: 2025-08-22 | End: 2025-08-26 | Stop reason: HOSPADM

## 2025-08-22 RX ORDER — PROCHLORPERAZINE EDISYLATE 5 MG/ML
5 INJECTION INTRAMUSCULAR; INTRAVENOUS EVERY 6 HOURS PRN
Status: DISCONTINUED | OUTPATIENT
Start: 2025-08-22 | End: 2025-08-26 | Stop reason: HOSPADM

## 2025-08-22 RX ORDER — TALC
6 POWDER (GRAM) TOPICAL NIGHTLY PRN
Status: DISCONTINUED | OUTPATIENT
Start: 2025-08-22 | End: 2025-08-26 | Stop reason: HOSPADM

## 2025-08-22 RX ORDER — IBUPROFEN 200 MG
24 TABLET ORAL
Status: DISCONTINUED | OUTPATIENT
Start: 2025-08-22 | End: 2025-08-26 | Stop reason: HOSPADM

## 2025-08-22 RX ORDER — ONDANSETRON HYDROCHLORIDE 2 MG/ML
4 INJECTION, SOLUTION INTRAVENOUS EVERY 4 HOURS PRN
Status: DISCONTINUED | OUTPATIENT
Start: 2025-08-22 | End: 2025-08-26 | Stop reason: HOSPADM

## 2025-08-22 RX ORDER — BISACODYL 10 MG/1
10 SUPPOSITORY RECTAL DAILY PRN
Status: DISCONTINUED | OUTPATIENT
Start: 2025-08-22 | End: 2025-08-26 | Stop reason: HOSPADM

## 2025-08-22 RX ORDER — GLUCAGON 1 MG
1 KIT INJECTION
Status: DISCONTINUED | OUTPATIENT
Start: 2025-08-22 | End: 2025-08-26 | Stop reason: HOSPADM

## 2025-08-22 RX ORDER — NALOXONE HCL 0.4 MG/ML
0.02 VIAL (ML) INJECTION
Status: DISCONTINUED | OUTPATIENT
Start: 2025-08-22 | End: 2025-08-26 | Stop reason: HOSPADM

## 2025-08-22 RX ORDER — CEFTRIAXONE 1 G/1
1 INJECTION, POWDER, FOR SOLUTION INTRAMUSCULAR; INTRAVENOUS
Status: DISCONTINUED | OUTPATIENT
Start: 2025-08-22 | End: 2025-08-24

## 2025-08-22 RX ORDER — ALUMINUM HYDROXIDE, MAGNESIUM HYDROXIDE, AND SIMETHICONE 1200; 120; 1200 MG/30ML; MG/30ML; MG/30ML
30 SUSPENSION ORAL 4 TIMES DAILY PRN
Status: DISCONTINUED | OUTPATIENT
Start: 2025-08-22 | End: 2025-08-26 | Stop reason: HOSPADM

## 2025-08-22 RX ORDER — HALOPERIDOL LACTATE 5 MG/ML
5 INJECTION, SOLUTION INTRAMUSCULAR
Status: COMPLETED | OUTPATIENT
Start: 2025-08-22 | End: 2025-08-22

## 2025-08-22 RX ORDER — ENOXAPARIN SODIUM 100 MG/ML
40 INJECTION SUBCUTANEOUS EVERY 24 HOURS
Status: DISCONTINUED | OUTPATIENT
Start: 2025-08-23 | End: 2025-08-26 | Stop reason: HOSPADM

## 2025-08-22 RX ORDER — AMOXICILLIN 250 MG
1 CAPSULE ORAL 2 TIMES DAILY PRN
Status: DISCONTINUED | OUTPATIENT
Start: 2025-08-22 | End: 2025-08-26 | Stop reason: HOSPADM

## 2025-08-22 RX ORDER — IPRATROPIUM BROMIDE AND ALBUTEROL SULFATE 2.5; .5 MG/3ML; MG/3ML
3 SOLUTION RESPIRATORY (INHALATION) EVERY 4 HOURS PRN
Status: DISCONTINUED | OUTPATIENT
Start: 2025-08-22 | End: 2025-08-26 | Stop reason: HOSPADM

## 2025-08-22 RX ORDER — DROPERIDOL 2.5 MG/ML
1.25 INJECTION, SOLUTION INTRAMUSCULAR; INTRAVENOUS ONCE
Status: COMPLETED | OUTPATIENT
Start: 2025-08-22 | End: 2025-08-22

## 2025-08-22 RX ORDER — ACETAMINOPHEN 500 MG
1000 TABLET ORAL EVERY 6 HOURS PRN
Status: DISCONTINUED | OUTPATIENT
Start: 2025-08-22 | End: 2025-08-23

## 2025-08-22 RX ORDER — SODIUM CHLORIDE 0.9 % (FLUSH) 0.9 %
10 SYRINGE (ML) INJECTION
Status: DISCONTINUED | OUTPATIENT
Start: 2025-08-22 | End: 2025-08-26 | Stop reason: HOSPADM

## 2025-08-22 RX ADMIN — IOHEXOL 100 ML: 350 INJECTION, SOLUTION INTRAVENOUS at 04:08

## 2025-08-22 RX ADMIN — DROPERIDOL 1.25 MG: 2.5 INJECTION, SOLUTION INTRAMUSCULAR; INTRAVENOUS at 03:08

## 2025-08-22 RX ADMIN — CEFTRIAXONE 1 G: 1 INJECTION, POWDER, FOR SOLUTION INTRAMUSCULAR; INTRAVENOUS at 07:08

## 2025-08-22 RX ADMIN — AZITHROMYCIN MONOHYDRATE 500 MG: 500 INJECTION, POWDER, LYOPHILIZED, FOR SOLUTION INTRAVENOUS at 08:08

## 2025-08-22 RX ADMIN — HALOPERIDOL LACTATE 5 MG: 5 INJECTION, SOLUTION INTRAMUSCULAR at 01:08

## 2025-08-23 LAB
ACB COMPLEX DNA BLD POS QL NAA+NON-PROBE: NOT DETECTED
ALBUMIN SERPL-MCNC: 3.2 G/DL (ref 3.4–4.8)
ALBUMIN/GLOB SERPL: 0.9 RATIO (ref 1.1–2)
ALP SERPL-CCNC: 98 UNIT/L (ref 40–150)
ALT SERPL-CCNC: 16 UNIT/L (ref 0–55)
ANION GAP SERPL CALC-SCNC: 7 MEQ/L
AST SERPL-CCNC: 22 UNIT/L (ref 11–45)
B FRAGILIS DNA BLD POS QL NAA+PROBE: NOT DETECTED
BASOPHILS # BLD AUTO: 0.03 X10(3)/MCL
BASOPHILS NFR BLD AUTO: 0.6 %
BILIRUB SERPL-MCNC: 0.4 MG/DL
BUN SERPL-MCNC: 15.6 MG/DL (ref 9.8–20.1)
C ALBICANS DNA BLD POS QL NAA+PROBE: NOT DETECTED
C AURIS DNA BLD POS QL NAA+NON-PROBE: NOT DETECTED
C GATTII+NEOFOR DNA CSF QL NAA+NON-PROBE: NOT DETECTED
C GLABRATA DNA BLD POS QL NAA+PROBE: NOT DETECTED
C KRUSEI DNA BLD POS QL NAA+PROBE: NOT DETECTED
C PARAP DNA BLD POS QL NAA+PROBE: NOT DETECTED
C TROPICLS DNA BLD POS QL NAA+PROBE: NOT DETECTED
CALCIUM SERPL-MCNC: 8.9 MG/DL (ref 8.4–10.2)
CHLORIDE SERPL-SCNC: 106 MMOL/L (ref 98–107)
CO2 SERPL-SCNC: 27 MMOL/L (ref 23–31)
COLISTIN RES MCR-1 ISLT/SPM QL: ABNORMAL
CREAT SERPL-MCNC: 0.69 MG/DL (ref 0.55–1.02)
CREAT/UREA NIT SERPL: 23
E CLOAC COMP DNA BLD POS QL NAA+PROBE: NOT DETECTED
E COLI DNA BLD POS QL NAA+PROBE: NOT DETECTED
E FAECALIS+OTHR E SP RRNA BLD POS FISH: NOT DETECTED
E FAECIUM HSP60 BLD POS QL PROBE: NOT DETECTED
ENTEROBACTERALES DNA BLD POS NAA+N-PRB: NOT DETECTED
EOSINOPHIL # BLD AUTO: 0.18 X10(3)/MCL (ref 0–0.9)
EOSINOPHIL NFR BLD AUTO: 3.3 %
ERYTHROCYTE [DISTWIDTH] IN BLOOD BY AUTOMATED COUNT: 13.2 % (ref 11.5–17)
ESBL CFT TO CFT-CLAV IC RTO BD POS IMP: ABNORMAL
GFR SERPLBLD CREATININE-BSD FMLA CKD-EPI: >60 ML/MIN/1.73/M2
GLOBULIN SER-MCNC: 3.7 GM/DL (ref 2.4–3.5)
GLUCOSE SERPL-MCNC: 101 MG/DL (ref 82–115)
GP B STREP DNA CSF QL NAA+NON-PROBE: NOT DETECTED
HAEM INFLU DNA CSF QL NAA+NON-PROBE: NOT DETECTED
HCT VFR BLD AUTO: 39.9 % (ref 37–47)
HGB BLD-MCNC: 13.8 G/DL (ref 12–16)
IMM GRANULOCYTES # BLD AUTO: 0.01 X10(3)/MCL (ref 0–0.04)
IMM GRANULOCYTES NFR BLD AUTO: 0.2 %
IMP CARBAPENEMASE ISLT QL IA.RAPID: ABNORMAL
K OXYTOCA OMPA BLD POS QL PROBE: NOT DETECTED
KLEBSIELLA SP DNA BLD POS QL NAA+NON-PRB: NOT DETECTED
KLEBSIELLA SP DNA BLD POS QL NAA+NON-PRB: NOT DETECTED
KPC CARBAPENEMASE ISLT QL IA.RAPID: ABNORMAL
L MONOCYTOG DNA CSF QL NAA+NON-PROBE: NOT DETECTED
LYMPHOCYTES # BLD AUTO: 2.01 X10(3)/MCL (ref 0.6–4.6)
LYMPHOCYTES NFR BLD AUTO: 36.9 %
MAGNESIUM SERPL-MCNC: 1.9 MG/DL (ref 1.6–2.6)
MCH RBC QN AUTO: 31.9 PG (ref 27–31)
MCHC RBC AUTO-ENTMCNC: 34.6 G/DL (ref 33–36)
MCV RBC AUTO: 92.1 FL (ref 80–94)
MECA+MECC NOSE QL NAA+PROBE: DETECTED
MECA+MECC+MREJ ISLT/SPM QL: ABNORMAL
MONOCYTES # BLD AUTO: 0.51 X10(3)/MCL (ref 0.1–1.3)
MONOCYTES NFR BLD AUTO: 9.4 %
N MEN DNA CSF QL NAA+NON-PROBE: NOT DETECTED
NDM CARBAPENEMASE ISLT QL IA.RAPID: ABNORMAL
NEUTROPHILS # BLD AUTO: 2.7 X10(3)/MCL (ref 2.1–9.2)
NEUTROPHILS NFR BLD AUTO: 49.6 %
NRBC BLD AUTO-RTO: 0 %
OXA-48-LIKE CRBPNASE ISLT QL IA.RAPID: ABNORMAL
P AERUGINOSA DNA BLD POS QL NAA+PROBE: NOT DETECTED
PLATELET # BLD AUTO: 200 X10(3)/MCL (ref 130–400)
PMV BLD AUTO: 9.7 FL (ref 7.4–10.4)
POTASSIUM SERPL-SCNC: 3.8 MMOL/L (ref 3.5–5.1)
PROT SERPL-MCNC: 6.9 GM/DL (ref 5.8–7.6)
PROTEUS SP DNA BLD POS QL NAA+PROBE: NOT DETECTED
RBC # BLD AUTO: 4.33 X10(6)/MCL (ref 4.2–5.4)
S AUREUS DNA BLD POS QL NAA+PROBE: NOT DETECTED
S ENT+BONG DNA STL QL NAA+NON-PROBE: NOT DETECTED
S EPIDERMIDIS HSP60 BLD POS QL PROBE: DETECTED
S LUGDUNENSIS SODA BLD POS QL PROBE: NOT DETECTED
S MALTOPH DNA BLD POS QL NAA+PROBE: NOT DETECTED
S MARCESCENS DNA BLD POS QL NAA+PROBE: NOT DETECTED
S PNEUM DNA CSF QL NAA+NON-PROBE: NOT DETECTED
S PYOGENES HSP60 BLD POS QL PROBE: NOT DETECTED
SODIUM SERPL-SCNC: 140 MMOL/L (ref 136–145)
STAPH SP TUF BLD POS QL PROBE: ABNORMAL
STREPTOCOCCUS SP TUF BLD POS QL PROBE: NOT DETECTED
VAN(A+B+C1+C2) GENES ISLT/SPM: ABNORMAL
VIM CARBAPENEMASE ISLT QL IA.RAPID: ABNORMAL
VIT B12 SERPL-MCNC: 525 PG/ML (ref 213–816)
WBC # BLD AUTO: 5.44 X10(3)/MCL (ref 4.5–11.5)

## 2025-08-23 PROCEDURE — 92610 EVALUATE SWALLOWING FUNCTION: CPT

## 2025-08-23 PROCEDURE — 51798 US URINE CAPACITY MEASURE: CPT

## 2025-08-23 PROCEDURE — 82607 VITAMIN B-12: CPT | Performed by: INTERNAL MEDICINE

## 2025-08-23 PROCEDURE — 25000003 PHARM REV CODE 250: Performed by: INTERNAL MEDICINE

## 2025-08-23 PROCEDURE — 85025 COMPLETE CBC W/AUTO DIFF WBC: CPT | Performed by: NURSE PRACTITIONER

## 2025-08-23 PROCEDURE — 63600175 PHARM REV CODE 636 W HCPCS: Performed by: INTERNAL MEDICINE

## 2025-08-23 PROCEDURE — 80053 COMPREHEN METABOLIC PANEL: CPT | Performed by: INTERNAL MEDICINE

## 2025-08-23 PROCEDURE — 83735 ASSAY OF MAGNESIUM: CPT | Performed by: INTERNAL MEDICINE

## 2025-08-23 PROCEDURE — 11000001 HC ACUTE MED/SURG PRIVATE ROOM

## 2025-08-23 PROCEDURE — 36415 COLL VENOUS BLD VENIPUNCTURE: CPT | Performed by: INTERNAL MEDICINE

## 2025-08-23 RX ORDER — SODIUM CHLORIDE, SODIUM LACTATE, POTASSIUM CHLORIDE, CALCIUM CHLORIDE 600; 310; 30; 20 MG/100ML; MG/100ML; MG/100ML; MG/100ML
INJECTION, SOLUTION INTRAVENOUS CONTINUOUS
Status: ACTIVE | OUTPATIENT
Start: 2025-08-23 | End: 2025-08-24

## 2025-08-23 RX ORDER — ZIPRASIDONE MESYLATE 20 MG/ML
10 INJECTION, POWDER, LYOPHILIZED, FOR SOLUTION INTRAMUSCULAR EVERY 6 HOURS PRN
Status: DISCONTINUED | OUTPATIENT
Start: 2025-08-23 | End: 2025-08-26 | Stop reason: HOSPADM

## 2025-08-23 RX ORDER — OXYBUTYNIN CHLORIDE 5 MG/1
5 TABLET ORAL DAILY
Status: DISCONTINUED | OUTPATIENT
Start: 2025-08-23 | End: 2025-08-26 | Stop reason: HOSPADM

## 2025-08-23 RX ORDER — ACETAMINOPHEN 325 MG/1
650 TABLET ORAL EVERY 8 HOURS PRN
Status: DISCONTINUED | OUTPATIENT
Start: 2025-08-23 | End: 2025-08-26 | Stop reason: HOSPADM

## 2025-08-23 RX ORDER — DIVALPROEX SODIUM 250 MG/1
250 TABLET, FILM COATED, EXTENDED RELEASE ORAL DAILY
Status: DISCONTINUED | OUTPATIENT
Start: 2025-08-23 | End: 2025-08-23

## 2025-08-23 RX ORDER — ATORVASTATIN CALCIUM 40 MG/1
40 TABLET, FILM COATED ORAL DAILY
Status: DISCONTINUED | OUTPATIENT
Start: 2025-08-23 | End: 2025-08-26 | Stop reason: HOSPADM

## 2025-08-23 RX ORDER — RIVASTIGMINE TARTRATE 1.5 MG/1
3 CAPSULE ORAL 2 TIMES DAILY
Status: DISCONTINUED | OUTPATIENT
Start: 2025-08-23 | End: 2025-08-26 | Stop reason: HOSPADM

## 2025-08-23 RX ORDER — OXCARBAZEPINE 150 MG/1
150 TABLET, FILM COATED ORAL DAILY
Status: DISCONTINUED | OUTPATIENT
Start: 2025-08-23 | End: 2025-08-23

## 2025-08-23 RX ORDER — LATANOPROST 50 UG/ML
1 SOLUTION/ DROPS OPHTHALMIC NIGHTLY
Status: DISCONTINUED | OUTPATIENT
Start: 2025-08-23 | End: 2025-08-26 | Stop reason: HOSPADM

## 2025-08-23 RX ORDER — MICONAZOLE NITRATE 2 G/100G
POWDER TOPICAL 2 TIMES DAILY
Status: DISCONTINUED | OUTPATIENT
Start: 2025-08-23 | End: 2025-08-26 | Stop reason: HOSPADM

## 2025-08-23 RX ORDER — METOPROLOL TARTRATE 25 MG/1
12.5 TABLET ORAL 2 TIMES DAILY
Status: DISCONTINUED | OUTPATIENT
Start: 2025-08-23 | End: 2025-08-26 | Stop reason: HOSPADM

## 2025-08-23 RX ORDER — HYDRALAZINE HYDROCHLORIDE 20 MG/ML
20 INJECTION INTRAMUSCULAR; INTRAVENOUS EVERY 8 HOURS PRN
Status: DISCONTINUED | OUTPATIENT
Start: 2025-08-23 | End: 2025-08-26 | Stop reason: HOSPADM

## 2025-08-23 RX ORDER — LANOLIN ALCOHOL/MO/W.PET/CERES
1 CREAM (GRAM) TOPICAL DAILY
Status: DISCONTINUED | OUTPATIENT
Start: 2025-08-23 | End: 2025-08-26 | Stop reason: HOSPADM

## 2025-08-23 RX ORDER — HYDRALAZINE HYDROCHLORIDE 20 MG/ML
10 INJECTION INTRAMUSCULAR; INTRAVENOUS EVERY 8 HOURS PRN
Status: DISCONTINUED | OUTPATIENT
Start: 2025-08-23 | End: 2025-08-26 | Stop reason: HOSPADM

## 2025-08-23 RX ORDER — FLUOXETINE 10 MG/1
10 CAPSULE ORAL DAILY
Status: DISCONTINUED | OUTPATIENT
Start: 2025-08-23 | End: 2025-08-26 | Stop reason: HOSPADM

## 2025-08-23 RX ORDER — DIPHENHYDRAMINE HYDROCHLORIDE 50 MG/ML
50 INJECTION, SOLUTION INTRAMUSCULAR; INTRAVENOUS EVERY 6 HOURS PRN
Status: DISCONTINUED | OUTPATIENT
Start: 2025-08-23 | End: 2025-08-26 | Stop reason: HOSPADM

## 2025-08-23 RX ORDER — OXCARBAZEPINE 60 MG/ML
150 SUSPENSION ORAL 2 TIMES DAILY
Status: DISCONTINUED | OUTPATIENT
Start: 2025-08-23 | End: 2025-08-26 | Stop reason: HOSPADM

## 2025-08-23 RX ADMIN — DEXTROSE MONOHYDRATE 250 MG: 50 INJECTION, SOLUTION INTRAVENOUS at 01:08

## 2025-08-23 RX ADMIN — CEFTRIAXONE 1 G: 1 INJECTION, POWDER, FOR SOLUTION INTRAMUSCULAR; INTRAVENOUS at 06:08

## 2025-08-23 RX ADMIN — AZITHROMYCIN MONOHYDRATE 500 MG: 500 INJECTION, POWDER, LYOPHILIZED, FOR SOLUTION INTRAVENOUS at 06:08

## 2025-08-23 RX ADMIN — OXCARBAZEPINE 150 MG: 300 SUSPENSION ORAL at 09:08

## 2025-08-23 RX ADMIN — ENOXAPARIN SODIUM 40 MG: 40 INJECTION SUBCUTANEOUS at 04:08

## 2025-08-23 RX ADMIN — MICONAZOLE NITRATE: 20 POWDER TOPICAL at 09:08

## 2025-08-23 RX ADMIN — HYDRALAZINE HYDROCHLORIDE 20 MG: 20 INJECTION INTRAMUSCULAR; INTRAVENOUS at 11:08

## 2025-08-23 RX ADMIN — METOPROLOL TARTRATE 12.5 MG: 25 TABLET, FILM COATED ORAL at 09:08

## 2025-08-23 RX ADMIN — LATANOPROST 1 DROP: 50 SOLUTION OPHTHALMIC at 09:08

## 2025-08-23 RX ADMIN — SODIUM CHLORIDE, POTASSIUM CHLORIDE, SODIUM LACTATE AND CALCIUM CHLORIDE: 600; 310; 30; 20 INJECTION, SOLUTION INTRAVENOUS at 01:08

## 2025-08-23 RX ADMIN — DIPHENHYDRAMINE HYDROCHLORIDE 50 MG: 50 INJECTION INTRAMUSCULAR; INTRAVENOUS at 04:08

## 2025-08-23 RX ADMIN — RIVASTIGMINE TARTRATE 3 MG: 1.5 CAPSULE ORAL at 09:08

## 2025-08-23 RX ADMIN — DIPHENHYDRAMINE HYDROCHLORIDE 50 MG: 50 INJECTION INTRAMUSCULAR; INTRAVENOUS at 09:08

## 2025-08-24 PROCEDURE — 63600175 PHARM REV CODE 636 W HCPCS: Performed by: INTERNAL MEDICINE

## 2025-08-24 PROCEDURE — 25000003 PHARM REV CODE 250: Performed by: INTERNAL MEDICINE

## 2025-08-24 PROCEDURE — 92610 EVALUATE SWALLOWING FUNCTION: CPT

## 2025-08-24 PROCEDURE — 11000001 HC ACUTE MED/SURG PRIVATE ROOM

## 2025-08-24 RX ORDER — CEFTRIAXONE 1 G/1
1 INJECTION, POWDER, FOR SOLUTION INTRAMUSCULAR; INTRAVENOUS
Status: DISCONTINUED | OUTPATIENT
Start: 2025-08-24 | End: 2025-08-25

## 2025-08-24 RX ORDER — AMLODIPINE BESYLATE 5 MG/1
10 TABLET ORAL NIGHTLY
Status: DISCONTINUED | OUTPATIENT
Start: 2025-08-24 | End: 2025-08-26 | Stop reason: HOSPADM

## 2025-08-24 RX ADMIN — MICONAZOLE NITRATE: 20 POWDER TOPICAL at 09:08

## 2025-08-24 RX ADMIN — RIVASTIGMINE TARTRATE 3 MG: 1.5 CAPSULE ORAL at 09:08

## 2025-08-24 RX ADMIN — OXCARBAZEPINE 150 MG: 300 SUSPENSION ORAL at 09:08

## 2025-08-24 RX ADMIN — OXYBUTYNIN CHLORIDE 5 MG: 5 TABLET ORAL at 08:08

## 2025-08-24 RX ADMIN — AMLODIPINE BESYLATE 10 MG: 5 TABLET ORAL at 09:08

## 2025-08-24 RX ADMIN — ATORVASTATIN CALCIUM 40 MG: 40 TABLET, FILM COATED ORAL at 08:08

## 2025-08-24 RX ADMIN — FLUOXETINE HYDROCHLORIDE 10 MG: 10 CAPSULE ORAL at 08:08

## 2025-08-24 RX ADMIN — HYDRALAZINE HYDROCHLORIDE 20 MG: 20 INJECTION INTRAMUSCULAR; INTRAVENOUS at 05:08

## 2025-08-24 RX ADMIN — DEXTROSE MONOHYDRATE 250 MG: 50 INJECTION, SOLUTION INTRAVENOUS at 02:08

## 2025-08-24 RX ADMIN — DIPHENHYDRAMINE HYDROCHLORIDE 50 MG: 50 INJECTION INTRAMUSCULAR; INTRAVENOUS at 05:08

## 2025-08-24 RX ADMIN — RIVASTIGMINE TARTRATE 3 MG: 1.5 CAPSULE ORAL at 08:08

## 2025-08-24 RX ADMIN — ENOXAPARIN SODIUM 40 MG: 40 INJECTION SUBCUTANEOUS at 05:08

## 2025-08-24 RX ADMIN — DIPHENHYDRAMINE HYDROCHLORIDE 50 MG: 50 INJECTION INTRAMUSCULAR; INTRAVENOUS at 01:08

## 2025-08-24 RX ADMIN — FERROUS SULFATE TAB 325 MG (65 MG ELEMENTAL FE) 1 EACH: 325 (65 FE) TAB at 08:08

## 2025-08-24 RX ADMIN — ACETAMINOPHEN 650 MG: 325 TABLET ORAL at 09:08

## 2025-08-24 RX ADMIN — METOPROLOL TARTRATE 12.5 MG: 25 TABLET, FILM COATED ORAL at 09:08

## 2025-08-24 RX ADMIN — METOPROLOL TARTRATE 12.5 MG: 25 TABLET, FILM COATED ORAL at 08:08

## 2025-08-24 RX ADMIN — DIPHENHYDRAMINE HYDROCHLORIDE 50 MG: 50 INJECTION INTRAMUSCULAR; INTRAVENOUS at 10:08

## 2025-08-24 RX ADMIN — DEXTROSE MONOHYDRATE 250 MG: 50 INJECTION, SOLUTION INTRAVENOUS at 01:08

## 2025-08-24 RX ADMIN — CEFTRIAXONE SODIUM 1 G: 1 INJECTION, POWDER, FOR SOLUTION INTRAMUSCULAR; INTRAVENOUS at 05:08

## 2025-08-24 RX ADMIN — LATANOPROST 1 DROP: 50 SOLUTION OPHTHALMIC at 09:08

## 2025-08-24 RX ADMIN — OXCARBAZEPINE 150 MG: 300 SUSPENSION ORAL at 08:08

## 2025-08-24 RX ADMIN — AZITHROMYCIN 500 MG: 500 INJECTION, POWDER, LYOPHILIZED, FOR SOLUTION INTRAVENOUS at 06:08

## 2025-08-25 LAB
ANION GAP SERPL CALC-SCNC: 8 MEQ/L
BACTERIA BLD CULT: ABNORMAL
BASOPHILS # BLD AUTO: 0.04 X10(3)/MCL
BASOPHILS NFR BLD AUTO: 0.8 %
BUN SERPL-MCNC: 13.6 MG/DL (ref 9.8–20.1)
CALCIUM SERPL-MCNC: 9.1 MG/DL (ref 8.4–10.2)
CHLORIDE SERPL-SCNC: 105 MMOL/L (ref 98–107)
CO2 SERPL-SCNC: 25 MMOL/L (ref 23–31)
CREAT SERPL-MCNC: 0.77 MG/DL (ref 0.55–1.02)
CREAT/UREA NIT SERPL: 18
EOSINOPHIL # BLD AUTO: 0.33 X10(3)/MCL (ref 0–0.9)
EOSINOPHIL NFR BLD AUTO: 6.6 %
ERYTHROCYTE [DISTWIDTH] IN BLOOD BY AUTOMATED COUNT: 13.7 % (ref 11.5–17)
GFR SERPLBLD CREATININE-BSD FMLA CKD-EPI: >60 ML/MIN/1.73/M2
GLUCOSE SERPL-MCNC: 101 MG/DL (ref 82–115)
GRAM STN SPEC: ABNORMAL
HCT VFR BLD AUTO: 40.9 % (ref 37–47)
HGB BLD-MCNC: 13.8 G/DL (ref 12–16)
IMM GRANULOCYTES # BLD AUTO: 0.01 X10(3)/MCL (ref 0–0.04)
IMM GRANULOCYTES NFR BLD AUTO: 0.2 %
LYMPHOCYTES # BLD AUTO: 2.38 X10(3)/MCL (ref 0.6–4.6)
LYMPHOCYTES NFR BLD AUTO: 47.7 %
MAGNESIUM SERPL-MCNC: 1.9 MG/DL (ref 1.6–2.6)
MCH RBC QN AUTO: 31.4 PG (ref 27–31)
MCHC RBC AUTO-ENTMCNC: 33.7 G/DL (ref 33–36)
MCV RBC AUTO: 93.2 FL (ref 80–94)
MONOCYTES # BLD AUTO: 0.51 X10(3)/MCL (ref 0.1–1.3)
MONOCYTES NFR BLD AUTO: 10.2 %
NEUTROPHILS # BLD AUTO: 1.72 X10(3)/MCL (ref 2.1–9.2)
NEUTROPHILS NFR BLD AUTO: 34.5 %
NRBC BLD AUTO-RTO: 0 %
PHOSPHATE SERPL-MCNC: 3.6 MG/DL (ref 2.3–4.7)
PLATELET # BLD AUTO: 215 X10(3)/MCL (ref 130–400)
PMV BLD AUTO: 10.1 FL (ref 7.4–10.4)
POTASSIUM SERPL-SCNC: 4.1 MMOL/L (ref 3.5–5.1)
RBC # BLD AUTO: 4.39 X10(6)/MCL (ref 4.2–5.4)
SODIUM SERPL-SCNC: 138 MMOL/L (ref 136–145)
WBC # BLD AUTO: 4.99 X10(3)/MCL (ref 4.5–11.5)

## 2025-08-25 PROCEDURE — 99223 1ST HOSP IP/OBS HIGH 75: CPT | Mod: ,,, | Performed by: INTERNAL MEDICINE

## 2025-08-25 PROCEDURE — 85025 COMPLETE CBC W/AUTO DIFF WBC: CPT | Performed by: INTERNAL MEDICINE

## 2025-08-25 PROCEDURE — 36415 COLL VENOUS BLD VENIPUNCTURE: CPT | Performed by: INTERNAL MEDICINE

## 2025-08-25 PROCEDURE — 63600175 PHARM REV CODE 636 W HCPCS: Performed by: INTERNAL MEDICINE

## 2025-08-25 PROCEDURE — 80048 BASIC METABOLIC PNL TOTAL CA: CPT | Performed by: INTERNAL MEDICINE

## 2025-08-25 PROCEDURE — 25000003 PHARM REV CODE 250: Performed by: INTERNAL MEDICINE

## 2025-08-25 PROCEDURE — 84100 ASSAY OF PHOSPHORUS: CPT | Performed by: INTERNAL MEDICINE

## 2025-08-25 PROCEDURE — 11000001 HC ACUTE MED/SURG PRIVATE ROOM

## 2025-08-25 PROCEDURE — 83735 ASSAY OF MAGNESIUM: CPT | Performed by: INTERNAL MEDICINE

## 2025-08-25 RX ORDER — CEFTRIAXONE 1 G/1
1 INJECTION, POWDER, FOR SOLUTION INTRAMUSCULAR; INTRAVENOUS
Status: DISCONTINUED | OUTPATIENT
Start: 2025-08-25 | End: 2025-08-26

## 2025-08-25 RX ORDER — DIVALPROEX SODIUM 250 MG/1
250 TABLET, FILM COATED, EXTENDED RELEASE ORAL EVERY 12 HOURS
Status: DISCONTINUED | OUTPATIENT
Start: 2025-08-25 | End: 2025-08-26

## 2025-08-25 RX ADMIN — CEFTRIAXONE 1 G: 1 INJECTION, POWDER, FOR SOLUTION INTRAMUSCULAR; INTRAVENOUS at 06:08

## 2025-08-25 RX ADMIN — OXCARBAZEPINE 150 MG: 300 SUSPENSION ORAL at 09:08

## 2025-08-25 RX ADMIN — OXCARBAZEPINE 150 MG: 300 SUSPENSION ORAL at 10:08

## 2025-08-25 RX ADMIN — ZIPRASIDONE MESYLATE 10 MG: 20 INJECTION, POWDER, LYOPHILIZED, FOR SOLUTION INTRAMUSCULAR at 10:08

## 2025-08-25 RX ADMIN — RIVASTIGMINE TARTRATE 3 MG: 1.5 CAPSULE ORAL at 10:08

## 2025-08-25 RX ADMIN — MICONAZOLE NITRATE: 20 POWDER TOPICAL at 09:08

## 2025-08-25 RX ADMIN — AMLODIPINE BESYLATE 10 MG: 5 TABLET ORAL at 10:08

## 2025-08-25 RX ADMIN — FLUOXETINE HYDROCHLORIDE 10 MG: 10 CAPSULE ORAL at 09:08

## 2025-08-25 RX ADMIN — ENOXAPARIN SODIUM 40 MG: 40 INJECTION SUBCUTANEOUS at 05:08

## 2025-08-25 RX ADMIN — HYDRALAZINE HYDROCHLORIDE 10 MG: 20 INJECTION INTRAMUSCULAR; INTRAVENOUS at 03:08

## 2025-08-25 RX ADMIN — DEXTROSE MONOHYDRATE 250 MG: 50 INJECTION, SOLUTION INTRAVENOUS at 02:08

## 2025-08-25 RX ADMIN — LATANOPROST 1 DROP: 50 SOLUTION OPHTHALMIC at 10:08

## 2025-08-25 RX ADMIN — RIVASTIGMINE TARTRATE 3 MG: 1.5 CAPSULE ORAL at 09:08

## 2025-08-25 RX ADMIN — DIVALPROEX SODIUM 250 MG: 250 TABLET, EXTENDED RELEASE ORAL at 10:08

## 2025-08-25 RX ADMIN — ATORVASTATIN CALCIUM 40 MG: 40 TABLET, FILM COATED ORAL at 09:08

## 2025-08-25 RX ADMIN — FERROUS SULFATE TAB 325 MG (65 MG ELEMENTAL FE) 1 EACH: 325 (65 FE) TAB at 09:08

## 2025-08-25 RX ADMIN — OXYBUTYNIN CHLORIDE 5 MG: 5 TABLET ORAL at 09:08

## 2025-08-25 RX ADMIN — DEXTROSE MONOHYDRATE 250 MG: 50 INJECTION, SOLUTION INTRAVENOUS at 03:08

## 2025-08-25 RX ADMIN — METOPROLOL TARTRATE 12.5 MG: 25 TABLET, FILM COATED ORAL at 10:08

## 2025-08-25 RX ADMIN — MICONAZOLE NITRATE: 20 POWDER TOPICAL at 10:08

## 2025-08-25 RX ADMIN — METOPROLOL TARTRATE 12.5 MG: 25 TABLET, FILM COATED ORAL at 09:08

## 2025-08-26 VITALS
RESPIRATION RATE: 18 BRPM | SYSTOLIC BLOOD PRESSURE: 172 MMHG | HEIGHT: 65 IN | TEMPERATURE: 97 F | WEIGHT: 173.31 LBS | HEART RATE: 72 BPM | BODY MASS INDEX: 28.87 KG/M2 | OXYGEN SATURATION: 97 % | DIASTOLIC BLOOD PRESSURE: 82 MMHG

## 2025-08-26 PROCEDURE — 63600175 PHARM REV CODE 636 W HCPCS: Performed by: INTERNAL MEDICINE

## 2025-08-26 PROCEDURE — 25000003 PHARM REV CODE 250: Performed by: INTERNAL MEDICINE

## 2025-08-26 RX ORDER — AMLODIPINE BESYLATE 10 MG/1
10 TABLET ORAL DAILY
Start: 2025-08-26 | End: 2026-08-26

## 2025-08-26 RX ORDER — VALPROIC ACID 250 MG/5ML
250 SOLUTION ORAL EVERY 12 HOURS
Status: DISCONTINUED | OUTPATIENT
Start: 2025-08-26 | End: 2025-08-26 | Stop reason: HOSPADM

## 2025-08-26 RX ORDER — CEFTRIAXONE 1 G/1
1 INJECTION, POWDER, FOR SOLUTION INTRAMUSCULAR; INTRAVENOUS
Status: COMPLETED | OUTPATIENT
Start: 2025-08-26 | End: 2025-08-26

## 2025-08-26 RX ORDER — DIVALPROEX SODIUM 250 MG/1
250 TABLET, FILM COATED, EXTENDED RELEASE ORAL EVERY 12 HOURS
Start: 2025-08-26 | End: 2025-08-26 | Stop reason: HOSPADM

## 2025-08-26 RX ORDER — METOPROLOL TARTRATE 25 MG/1
12.5 TABLET, FILM COATED ORAL 2 TIMES DAILY
Start: 2025-08-26 | End: 2026-08-26

## 2025-08-26 RX ORDER — VALPROIC ACID 250 MG/5ML
250 SOLUTION ORAL EVERY 12 HOURS
Start: 2025-08-26 | End: 2026-08-26

## 2025-08-26 RX ORDER — OXCARBAZEPINE 150 MG/1
150 TABLET, FILM COATED ORAL 2 TIMES DAILY
Start: 2025-08-26

## 2025-08-26 RX ORDER — IPRATROPIUM BROMIDE AND ALBUTEROL SULFATE 2.5; .5 MG/3ML; MG/3ML
3 SOLUTION RESPIRATORY (INHALATION) EVERY 4 HOURS PRN
Start: 2025-08-26 | End: 2026-08-26

## 2025-08-26 RX ADMIN — RIVASTIGMINE TARTRATE 3 MG: 1.5 CAPSULE ORAL at 10:08

## 2025-08-26 RX ADMIN — METOPROLOL TARTRATE 12.5 MG: 25 TABLET, FILM COATED ORAL at 10:08

## 2025-08-26 RX ADMIN — ENOXAPARIN SODIUM 40 MG: 40 INJECTION SUBCUTANEOUS at 06:08

## 2025-08-26 RX ADMIN — OXYBUTYNIN CHLORIDE 5 MG: 5 TABLET ORAL at 10:08

## 2025-08-26 RX ADMIN — FLUOXETINE HYDROCHLORIDE 10 MG: 10 CAPSULE ORAL at 10:08

## 2025-08-26 RX ADMIN — FERROUS SULFATE TAB 325 MG (65 MG ELEMENTAL FE) 1 EACH: 325 (65 FE) TAB at 10:08

## 2025-08-26 RX ADMIN — ATORVASTATIN CALCIUM 40 MG: 40 TABLET, FILM COATED ORAL at 10:08

## 2025-08-26 RX ADMIN — CEFTRIAXONE 1 G: 1 INJECTION, POWDER, FOR SOLUTION INTRAMUSCULAR; INTRAVENOUS at 10:08

## 2025-08-26 RX ADMIN — OXCARBAZEPINE 150 MG: 300 SUSPENSION ORAL at 10:08

## 2025-08-26 RX ADMIN — MICONAZOLE NITRATE: 20 POWDER TOPICAL at 10:08

## 2025-08-27 ENCOUNTER — LAB REQUISITION (OUTPATIENT)
Dept: LAB | Facility: HOSPITAL | Age: 80
End: 2025-08-27
Payer: MEDICARE

## 2025-08-27 DIAGNOSIS — N18.9 CHRONIC KIDNEY DISEASE, UNSPECIFIED: ICD-10-CM

## 2025-08-27 DIAGNOSIS — D50.9 IRON DEFICIENCY ANEMIA, UNSPECIFIED: ICD-10-CM

## 2025-08-27 DIAGNOSIS — E11.21 TYPE 2 DIABETES MELLITUS WITH DIABETIC NEPHROPATHY: ICD-10-CM

## 2025-08-27 DIAGNOSIS — E78.5 HYPERLIPIDEMIA, UNSPECIFIED: ICD-10-CM

## 2025-08-27 LAB
ALBUMIN SERPL-MCNC: 3.3 G/DL (ref 3.4–4.8)
ALBUMIN/GLOB SERPL: 0.9 RATIO (ref 1.1–2)
ALP SERPL-CCNC: 91 UNIT/L (ref 40–150)
ALT SERPL-CCNC: 16 UNIT/L (ref 0–55)
ANION GAP SERPL CALC-SCNC: 8 MEQ/L
AST SERPL-CCNC: 25 UNIT/L (ref 11–45)
BACTERIA BLD CULT: NORMAL
BASOPHILS # BLD AUTO: 0.03 X10(3)/MCL
BASOPHILS NFR BLD AUTO: 0.6 %
BILIRUB SERPL-MCNC: 0.3 MG/DL
BUN SERPL-MCNC: 18.7 MG/DL (ref 9.8–20.1)
CALCIUM SERPL-MCNC: 8.9 MG/DL (ref 8.4–10.2)
CHLORIDE SERPL-SCNC: 109 MMOL/L (ref 98–107)
CHOLEST SERPL-MCNC: 145 MG/DL
CHOLEST/HDLC SERPL: 4 {RATIO} (ref 0–5)
CO2 SERPL-SCNC: 27 MMOL/L (ref 23–31)
CREAT SERPL-MCNC: 0.7 MG/DL (ref 0.55–1.02)
CREAT/UREA NIT SERPL: 27
EOSINOPHIL # BLD AUTO: 0.37 X10(3)/MCL (ref 0–0.9)
EOSINOPHIL NFR BLD AUTO: 7.8 %
ERYTHROCYTE [DISTWIDTH] IN BLOOD BY AUTOMATED COUNT: 13.2 % (ref 11.5–17)
EST. AVERAGE GLUCOSE BLD GHB EST-MCNC: 105.4 MG/DL
GFR SERPLBLD CREATININE-BSD FMLA CKD-EPI: >60 ML/MIN/1.73/M2
GLOBULIN SER-MCNC: 3.5 GM/DL (ref 2.4–3.5)
GLUCOSE SERPL-MCNC: 89 MG/DL (ref 82–115)
HBA1C MFR BLD: 5.3 %
HCT VFR BLD AUTO: 39.2 % (ref 37–47)
HDLC SERPL-MCNC: 37 MG/DL (ref 35–60)
HGB BLD-MCNC: 13.2 G/DL (ref 12–16)
IMM GRANULOCYTES # BLD AUTO: 0.01 X10(3)/MCL (ref 0–0.04)
IMM GRANULOCYTES NFR BLD AUTO: 0.2 %
LDLC SERPL CALC-MCNC: 97 MG/DL (ref 50–140)
LYMPHOCYTES # BLD AUTO: 2.58 X10(3)/MCL (ref 0.6–4.6)
LYMPHOCYTES NFR BLD AUTO: 54.1 %
MCH RBC QN AUTO: 31.5 PG (ref 27–31)
MCHC RBC AUTO-ENTMCNC: 33.7 G/DL (ref 33–36)
MCV RBC AUTO: 93.6 FL (ref 80–94)
MONOCYTES # BLD AUTO: 0.53 X10(3)/MCL (ref 0.1–1.3)
MONOCYTES NFR BLD AUTO: 11.1 %
NEUTROPHILS # BLD AUTO: 1.25 X10(3)/MCL (ref 2.1–9.2)
NEUTROPHILS NFR BLD AUTO: 26.2 %
NRBC BLD AUTO-RTO: 0 %
PLATELET # BLD AUTO: 223 X10(3)/MCL (ref 130–400)
PMV BLD AUTO: 10.7 FL (ref 7.4–10.4)
POTASSIUM SERPL-SCNC: 3.9 MMOL/L (ref 3.5–5.1)
PROT SERPL-MCNC: 6.8 GM/DL (ref 5.8–7.6)
RBC # BLD AUTO: 4.19 X10(6)/MCL (ref 4.2–5.4)
SODIUM SERPL-SCNC: 144 MMOL/L (ref 136–145)
TRIGL SERPL-MCNC: 57 MG/DL (ref 37–140)
TSH SERPL-ACNC: 1.16 UIU/ML (ref 0.35–4.94)
VALPROATE SERPL-MCNC: 31.4 UG/ML (ref 50–100)
VLDLC SERPL CALC-MCNC: 11 MG/DL
WBC # BLD AUTO: 4.77 X10(3)/MCL (ref 4.5–11.5)

## 2025-08-27 PROCEDURE — 83036 HEMOGLOBIN GLYCOSYLATED A1C: CPT

## 2025-08-27 PROCEDURE — 80164 ASSAY DIPROPYLACETIC ACD TOT: CPT

## 2025-08-27 PROCEDURE — 80061 LIPID PANEL: CPT

## 2025-08-27 PROCEDURE — 85025 COMPLETE CBC W/AUTO DIFF WBC: CPT

## 2025-08-27 PROCEDURE — 80053 COMPREHEN METABOLIC PANEL: CPT

## 2025-08-27 PROCEDURE — 84443 ASSAY THYROID STIM HORMONE: CPT
